# Patient Record
Sex: FEMALE | Race: WHITE | NOT HISPANIC OR LATINO | Employment: OTHER | URBAN - METROPOLITAN AREA
[De-identification: names, ages, dates, MRNs, and addresses within clinical notes are randomized per-mention and may not be internally consistent; named-entity substitution may affect disease eponyms.]

---

## 2017-04-12 ENCOUNTER — TELEPHONE (OUTPATIENT)
Dept: FAMILY MEDICINE | Facility: CLINIC | Age: 67
End: 2017-04-12

## 2017-04-12 NOTE — TELEPHONE ENCOUNTER
Spoke with pt and informed her that she does not need to get labs done until she is seen at the new office in [place], pt scheduled appt for 05/02/2017.

## 2017-04-12 NOTE — TELEPHONE ENCOUNTER
----- Message from Rosa Paul sent at 4/12/2017 11:15 AM CDT -----  Contact: self  Patient is calling from [facility] for lab orders. Patient states the orders are not there. Please call patient at [phone]. Thanks!

## 2017-04-18 ENCOUNTER — PATIENT OUTREACH (OUTPATIENT)
Dept: ADMINISTRATIVE | Facility: HOSPITAL | Age: 67
End: 2017-04-18

## 2017-04-18 NOTE — LETTER
April 18, 2017    Breanna SELENE Shira  [address] [facility] [address]             [facility]  [address]  [facility] [address]  Phone: [phone] Dear Mrs. Silva:    [facility] is committed to your overall health.  To help you get the most out of each of your visits, we will review your information to make sure you are up to date on all of your recommended tests and/or procedures.      Dr. Nguyễn Nicholas is a new provider to us and we may not have your complete medical records on file here at [facility].  We have requested his records from the [facility].  The records we have received so far show that you may be due for your fasting cholesterol labs, colon cancer screening, hepatitis C screening, and possibly some immunizations (flu, pneumonia, shingles, and tetanus).    Medicare does not cover all immunizations to be given in the clinic.  Check your benefits to ensure that you do not need to receive your immunizations at the pharmacy.    If you have had any of the above done at another facility, please bring the records or information with you so that your record at [facility] will be complete.    If you are currently taking medication, please bring it with you to your appointment for review.    Also, if you have any type of Advanced Directives, please bring them with you to your office visit so we may scan them into your chart.    If you have any questions or concerns, please don't hesitate to call.    Thank you for letting us care for you,  Mayra Cruz LPN Clinical Care Coordinator  [facility] [place] [facility] [place]  [phone]

## 2017-05-02 ENCOUNTER — OFFICE VISIT (OUTPATIENT)
Dept: FAMILY MEDICINE | Facility: CLINIC | Age: 67
End: 2017-05-02
Payer: MEDICARE

## 2017-05-02 VITALS
HEART RATE: 66 BPM | OXYGEN SATURATION: 99 % | HEIGHT: 60 IN | SYSTOLIC BLOOD PRESSURE: 130 MMHG | BODY MASS INDEX: 32.96 KG/M2 | WEIGHT: 167.88 LBS | TEMPERATURE: 99 F | DIASTOLIC BLOOD PRESSURE: 80 MMHG

## 2017-05-02 DIAGNOSIS — R03.0 PRE-HYPERTENSION: ICD-10-CM

## 2017-05-02 DIAGNOSIS — E78.00 PURE HYPERCHOLESTEROLEMIA: ICD-10-CM

## 2017-05-02 DIAGNOSIS — M85.80 OSTEOPENIA, UNSPECIFIED LOCATION: ICD-10-CM

## 2017-05-02 DIAGNOSIS — E04.1 THYROID NODULE: ICD-10-CM

## 2017-05-02 DIAGNOSIS — Z00.00 HEALTHCARE MAINTENANCE: ICD-10-CM

## 2017-05-02 DIAGNOSIS — J45.20 MILD INTERMITTENT ASTHMA WITHOUT COMPLICATION: ICD-10-CM

## 2017-05-02 DIAGNOSIS — E55.9 VITAMIN D INSUFFICIENCY: ICD-10-CM

## 2017-05-02 DIAGNOSIS — E03.9 HYPOTHYROIDISM, UNSPECIFIED TYPE: ICD-10-CM

## 2017-05-02 DIAGNOSIS — E11.9 TYPE 2 DIABETES MELLITUS WITHOUT COMPLICATION, WITHOUT LONG-TERM CURRENT USE OF INSULIN: ICD-10-CM

## 2017-05-02 DIAGNOSIS — K63.5 POLYP OF COLON, UNSPECIFIED PART OF COLON, UNSPECIFIED TYPE: ICD-10-CM

## 2017-05-02 DIAGNOSIS — E66.9 OBESITY (BMI 30.0-34.9): ICD-10-CM

## 2017-05-02 DIAGNOSIS — J30.2 SEASONAL ALLERGIC RHINITIS, UNSPECIFIED ALLERGIC RHINITIS TRIGGER: Primary | ICD-10-CM

## 2017-05-02 PROCEDURE — 1160F RVW MEDS BY RX/DR IN RCRD: CPT | Mod: S$GLB,,, | Performed by: INTERNAL MEDICINE

## 2017-05-02 PROCEDURE — 1126F AMNT PAIN NOTED NONE PRSNT: CPT | Mod: S$GLB,,, | Performed by: INTERNAL MEDICINE

## 2017-05-02 PROCEDURE — 99999 PR PBB SHADOW E&M-EST. PATIENT-LVL III: CPT | Mod: PBBFAC,,, | Performed by: INTERNAL MEDICINE

## 2017-05-02 PROCEDURE — 99214 OFFICE O/P EST MOD 30 MIN: CPT | Mod: S$GLB,,, | Performed by: INTERNAL MEDICINE

## 2017-05-02 PROCEDURE — 1159F MED LIST DOCD IN RCRD: CPT | Mod: S$GLB,,, | Performed by: INTERNAL MEDICINE

## 2017-05-02 RX ORDER — FUROSEMIDE 20 MG/1
20 TABLET ORAL DAILY
COMMUNITY
End: 2017-05-02 | Stop reason: SDUPTHER

## 2017-05-02 RX ORDER — UBIDECARENONE 30 MG
200 CAPSULE ORAL DAILY
COMMUNITY
End: 2017-12-13 | Stop reason: DRUGHIGH

## 2017-05-02 RX ORDER — ALBUTEROL SULFATE 90 UG/1
2 AEROSOL, METERED RESPIRATORY (INHALATION) EVERY 6 HOURS PRN
COMMUNITY
End: 2021-07-19 | Stop reason: SDUPTHER

## 2017-05-02 RX ORDER — FUROSEMIDE 20 MG/1
20 TABLET ORAL DAILY
Qty: 30 TABLET | Refills: 2 | Status: SHIPPED | OUTPATIENT
Start: 2017-05-02 | End: 2017-08-31

## 2017-05-02 RX ORDER — BIOTIN 1 MG
1000 TABLET ORAL DAILY
COMMUNITY

## 2017-05-02 RX ORDER — MULTIVITAMIN
1 TABLET ORAL DAILY
COMMUNITY

## 2017-05-02 RX ORDER — ROSUVASTATIN CALCIUM 10 MG/1
10 TABLET, COATED ORAL NIGHTLY
COMMUNITY
End: 2017-05-02 | Stop reason: SDUPTHER

## 2017-05-02 RX ORDER — ROSUVASTATIN CALCIUM 10 MG/1
10 TABLET, COATED ORAL NIGHTLY
Qty: 30 TABLET | Refills: 2 | Status: SHIPPED | OUTPATIENT
Start: 2017-05-02 | End: 2018-08-13 | Stop reason: SDUPTHER

## 2017-05-02 RX ORDER — FLUTICASONE PROPIONATE AND SALMETEROL XINAFOATE 230; 21 UG/1; UG/1
2 AEROSOL, METERED RESPIRATORY (INHALATION) 2 TIMES DAILY
COMMUNITY
End: 2019-04-15 | Stop reason: SDUPTHER

## 2017-05-02 NOTE — TELEPHONE ENCOUNTER
Please notify patient of medications requested was sent in to pharmacy and a please keep her follow-up appointment

## 2017-05-02 NOTE — TELEPHONE ENCOUNTER
LOV 05/02/2017     Follow up scheduled for 05/24/2017.    Pt tolerates Crestor fine with no problems.

## 2017-05-02 NOTE — MR AVS SNAPSHOT
[facility]  [address]  [place] [address]  Phone: [phone]  Fax: [phone]                  Breanna Silva   2017 8:40 AM   Office Visit    Description:  Female : 1950   Provider:  Nguyễn Nicholas MD   Department:  [facility]           Reason for Visit     Establish Care           Diagnoses this Visit        Comments    Seasonal allergic rhinitis, unspecified allergic rhinitis trigger    -  Primary     Mild intermittent asthma without complication         Type 2 diabetes mellitus without complication, without long-term current use of insulin         Pure hypercholesterolemia         Hypothyroidism, unspecified type         Thyroid nodule         Osteopenia, unspecified location         Pre-hypertension         Vitamin D insufficiency         Obesity (BMI 30.0-34.9)         Healthcare maintenance         Polyp of colon, unspecified part of colon, unspecified type                To Do List           Future Appointments        Provider Department Dept Phone    2017 10:00 AM Nguyễn Nicholas MD [facility] [phone]      Goals (5 Years of Data)     None      Follow-Up and Disposition     Return in about 3 weeks (around 2017) for Reassessment and go over her labs.    Follow-up and Disposition History      [facility]     [facility] On [facility] Nurse Care Line -  Assistance  Unless otherwise directed by your provider, please contact [facility] On-Call, our nurse care line that is available for  assistance.     Registered nurses in the [facility] provide: appointment scheduling, clinical advisement, health education, and other advisory services.  Call: [phone] (toll free)               Medications           Message regarding Medications     Verify the changes and/or additions to your medication regime listed below are the same as discussed with your clinician today.  If any of these changes or  additions are incorrect, please notify your healthcare provider.             Verify that the below list of medications is an accurate representation of the medications you are currently taking.  If none reported, the list may be blank. If incorrect, please contact your healthcare provider. Carry this list with you in case of emergency.           Current Medications     albuterol (VENTOLIN HFA) 90 mcg/actuation inhaler Inhale 2 puffs into the lungs every 6 (six) hours as needed for Wheezing. Rescue    alendronate (FOSAMAX) 70 MG tablet Take 70 mg by mouth every 7 days.     biotin 1 mg tablet Take 1,000 mcg by mouth once daily.    CALCIUM CARBONATE/VITAMIN D3 (CALCIUM 600 + D,3, ORAL) Take 600 mg by mouth 2 (two) times daily.    co-enzyme Q-10 30 mg capsule Take 200 mg by mouth once daily.    fluticasone-salmeterol 230-21 mcg/dose (ADVAIR HFA) 230-21 mcg/actuation HFAA inhaler Inhale 2 puffs into the lungs 2 (two) times daily. Controller    furosemide (LASIX) 20 MG tablet Take 20 mg by mouth once daily.    LUTEIN ORAL Take by mouth once daily at 6am.    meloxicam (MOBIC) 15 MG tablet Take 1 tablet (15 mg total) by mouth once daily.    multivitamin (ONE DAILY MULTIVITAMIN) per tablet Take 1 tablet by mouth once daily.    rosuvastatin (CRESTOR) 10 MG tablet Take 10 mg by mouth every evening.    XOLAIR 150 mg injection every 28 days.            Clinical Reference Information           Your Vitals Were     BP Pulse Temp Height Weight SpO2    130/80 (BP Location: Right arm, Patient Position: Sitting, BP Method: Manual) 66 98.7 °F (37.1 °C) (Oral) 5' (1.524 m) 76.1 kg (167 lb 14.1 oz) 99%    BMI                32.79 kg/m2          Blood Pressure          Most Recent Value    BP  130/80      Allergies as of 5/2/2017     Byetta [Exenatide]    Flu Vaccine 2011-12(3 Yr+)(pf)    Metformin    Pravastatin Sodium    Prinivil [Lisinopril]    Singulair [Montelukast]    Soma [Carisoprodol]    Vioxx [Rofecoxib]    Avelox [Moxifloxacin]       Immunizations Administered on Date of Encounter - 5/2/2017     None      Orders Placed During Today's Visit     Future Labs/Procedures Expected by Expires    CBC auto differential  5/2/2017 7/1/2018    Comprehensive metabolic panel  5/2/2017 7/1/2018    Hemoglobin A1c  5/2/2017 7/1/2018    Hepatitis C antibody  5/2/2017 7/1/2018    Lipid panel  5/2/2017 7/1/2018    Magnesium  5/2/2017 7/1/2018    Occult blood x 1, stool  5/2/2017 5/2/2018    Occult blood x 1, stool  5/2/2017 5/2/2018    Occult blood x 1, stool  5/2/2017 5/2/2018    T3, free  5/2/2017 7/1/2018    T4, free  5/2/2017 7/1/2018    TSH  5/2/2017 7/1/2018    Urinalysis  5/2/2017 7/1/2018    Vitamin D  5/2/2017 7/1/2018      [facility] Sign-Up     Activating your [facility] account is as easy as 1-2-3!     1) Visit [email or web address], select Sign Up Now, enter this activation code and your date of birth, then select Next.  [email or web address]  Expires: 6/16/2017  9:54 AM      2) Create a username and password to use when you visit [facility] in the future and select a security question in case you lose your password and select Next.    3) Enter your e-mail address and click Sign Up!    Additional Information  If you have questions, please e-mail [email or web address] or call [phone] to talk to our [facility] staff. Remember, [facility] is NOT to be used for urgent needs. For medical emergencies, dial 911.         Language Assistance Services     ATTENTION: Language assistance services are available, free of charge. Please call [phone].      ATENCIÓN: Si habla español, tiene a hernandez disposición servicios gratuitos de asistencia lingüística. Llame al [phone].     CHÚ Ý: N?u b?n nói Ti?ng Vi?t, có các d?ch v? h? tr? ngôn ng? mi?n phí dành cho b?n. G?i s? [phone].         [facility] complies with applicable Federal civil rights laws and does not discriminate on the basis of race, color, national origin, age, disability, or  sex.

## 2017-05-02 NOTE — PROGRESS NOTES
Subjective:       Patient ID: Breanna Silva is a 66 y.o. female.    Chief Complaint: Establish Care    HPI    Review of Systems   Constitutional: Negative for appetite change, fatigue, fever and unexpected weight change.   HENT: Positive for postnasal drip.          history of seasonal ALLERGIES/ perennial ALLERGIES   Eyes: Negative for discharge and itching.        Slight tearing   Respiratory: Negative for cough, chest tightness, shortness of breath and wheezing.    Cardiovascular: Positive for leg swelling. Negative for chest pain and palpitations.        At times.   Gastrointestinal: Positive for constipation. Negative for abdominal distention, abdominal pain, blood in stool, diarrhea, nausea and vomiting.        Denies melena as well; uses metamucil   Endocrine: Negative for polydipsia, polyphagia and polyuria.   Genitourinary: Negative for dysuria, hematuria and urgency.   Musculoskeletal: Negative for arthralgias and myalgias.   Skin: Negative for rash.   Allergic/Immunologic: Positive for environmental allergies. Negative for food allergies and immunocompromised state.        Denies seasonal or perennial ALLERGIES.   Neurological: Negative for tremors, seizures and syncope.   Hematological: Negative for adenopathy. Bruises/bleeds easily.   Psychiatric/Behavioral:        Denies anxiety or depression       Objective:      Vitals:    05/02/17 0841   BP: 130/80   BP Location: Right arm   Patient Position: Sitting   BP Method: Manual   Pulse: 66   Temp: 98.7 °F (37.1 °C)   TempSrc: Oral   SpO2: 99%   Weight: 76.1 kg (167 lb 14.1 oz)   Height: 5' (1.524 m)     Body mass index is 32.79 kg/(m^2).    Physical Exam   Constitutional: She is oriented to person, place, and time. She appears well-developed and well-nourished.   HENT:   Head: Normocephalic and atraumatic.   TM's pink and throat; NM swollen/inflamed/clear mucus   Eyes: EOM are normal.   Neck: Normal range of motion. Neck supple. No thyromegaly present.    No carotid bruits heard   Cardiovascular: Normal rate, regular rhythm and normal heart sounds.  Exam reveals no gallop.    No murmur heard.  Pulmonary/Chest: Effort normal and breath sounds normal. No respiratory distress. She has no wheezes. She has no rales.    cc's.   Abdominal: Soft. Bowel sounds are normal. She exhibits no distension. There is no tenderness. There is no rebound and no guarding.   Musculoskeletal: Normal range of motion. She exhibits no edema.   Lymphadenopathy:     She has no cervical adenopathy.   Neurological: She is alert and oriented to person, place, and time.   Moves all 4 extremities fine.   Skin: No rash noted.   Psychiatric: She has a normal mood and affect. Her behavior is normal. Thought content normal.   Vitals reviewed.      Assessment:       1. Seasonal allergic rhinitis, unspecified allergic rhinitis trigger    2. Mild intermittent asthma without complication    3. Type 2 diabetes mellitus without complication, without long-term current use of insulin    4. Pure hypercholesterolemia    5. Hypothyroidism, unspecified type    6. Thyroid nodule    7. Osteopenia, unspecified location    8. Pre-hypertension    9. Vitamin D insufficiency    10. Obesity (BMI 30.0-34.9)    11. Healthcare maintenance    12. Polyp of colon, unspecified part of colon, unspecified type        Plan:       Seasonal allergic rhinitis, unspecified allergic rhinitis trigger; uses otc antihist; astelin nasal prn     Mild intermittent asthma without complication; on Xolair inj; sees Dr Nelson allergist; advair; albuterol rescue.         Has been stable.  -     CBC auto differential; Future; Expected date: 5/2/17    Type 2 diabetes mellitus without complication, without long-term current use of insulin. Maintain a low carb diet; monitor CBG's at home; keep a log for review.  -     Comprehensive metabolic panel; Future; Expected date: 5/2/17  -     Hemoglobin A1c; Future; Expected date: 5/2/17  -      Urinalysis; Future; Expected date: 5/2/17    Pure hypercholesterolemia. Maintain low fat high fiber diet, exercise regularly. On rosuvastatin.    Hypothyroidism, unspecified type; borderline; not on supplements. TFT's pending  -     TSH; Future; Expected date: 5/2/17  -     T4, free; Future; Expected date: 5/2/17  -     T3, free; Future; Expected date: 5/2/17    Thyroid nodule; yearly thyroid US; await [facility] facility records for review.    Osteopenia, unspecified location. Weight bearing exercises, continue calcium and vit D supplements. On fosamax; awaiting rerport from [facility]        -     Magnesium; Future; Expected date: 5/2/17  -     Vitamin D; Future; Expected date: 5/2/17    Pre-hypertension; watch salt intake; check BP periodically.    Vitamin D insufficiency; levels pending.    Obesity (BMI 30.0-34.9). Caloric restriction w regular exercise and weight reduction.  -     Lipid panel; Future; Expected date: 5/2/17    Healthcare maintenance  -     CBC auto differential; Future; Expected date: 5/2/17  -     Comprehensive metabolic panel; Future; Expected date: 5/2/17  -     Hepatitis C antibody; Future; Expected date: 5/2/17  -     Lipid panel; Future; Expected date: 5/2/17    Polyp of colon, unspecified part of colon, unspecified type; GI Dr mason to be called for TC arrangements  -     Occult blood x 1, stool; Future; Expected date: 5/2/17  -     Occult blood x 1, stool; Future; Expected date: 5/2/17  -     Occult blood x 1, stool; Future; Expected date: 5/2/17

## 2017-05-10 ENCOUNTER — PATIENT OUTREACH (OUTPATIENT)
Dept: ADMINISTRATIVE | Facility: HOSPITAL | Age: 67
End: 2017-05-10

## 2017-05-10 NOTE — LETTER
May 10, 2017    Breanna SELENE Shira  [address] [facility] [address]             [facility]  [address]  [facility] [address]  Phone: [phone] Dear Mrs. Silva:    [facility] is committed to your overall health.  To help you get the most out of each of your visits, we will review your information to make sure you are up to date on all of your recommended tests and/or procedures.      Dr. Nguyễn Nicholas has found that you may be due for your fasting cholesterol and diabetes labs, a urine test for your kidneys, your annual diabetic eye and foot exams, colon cancer screening, hepatitis C screening, and possibly some immunizations (pneumonia, shingles, and tetanus).     Medicare does not cover all immunizations to be given in the clinic.  Check your benefits to ensure that you do not need to receive your immunizations at the pharmacy.    If you have had any of the above done at another facility, please bring the records or information with you so that your record at [facility] will be complete.  If you would like to schedule any of these, please contact me.    If you are currently taking medication, please bring it with you to your appointment for review.    Also, if you have any type of Advanced Directives, please bring them with you to your office visit so we may scan them into your chart.    If you have any questions or concerns, please don't hesitate to call.    Thank you for letting us care for you,  Mayra Cruz LPN Clinical Care Coordinator  [facility] [place] [facility] [place]  [phone]

## 2017-05-11 LAB
APPEARANCE UR: ABNORMAL
BILIRUB UR QL STRIP: NEGATIVE
COLOR UR: YELLOW
GLUCOSE UR QL STRIP: NEGATIVE
HGB UR QL STRIP: NEGATIVE
KETONES UR QL STRIP: NEGATIVE
LEUKOCYTE ESTERASE UR QL STRIP: NEGATIVE
NITRITE UR QL STRIP: NEGATIVE
PH UR STRIP: 6 [PH] (ref 5–8)
PROT UR QL STRIP: NEGATIVE
SP GR UR STRIP: 1.02 (ref 1–1.03)

## 2017-05-16 LAB
1,25(OH)2D SERPL-MCNC: 31 PG/ML (ref 18–72)
1,25(OH)2D2 SERPL-MCNC: <8 PG/ML
1,25(OH)2D3 SERPL-MCNC: 31 PG/ML
ALBUMIN SERPL-MCNC: 4 G/DL (ref 3.6–5.1)
ALBUMIN/GLOB SERPL: 1.6 (CALC) (ref 1–2.5)
ALP SERPL-CCNC: 50 U/L (ref 33–130)
ALT SERPL-CCNC: 23 U/L (ref 6–29)
AST SERPL-CCNC: 25 U/L (ref 10–35)
BASOPHILS # BLD AUTO: 37 CELLS/UL (ref 0–200)
BASOPHILS NFR BLD AUTO: 0.6 %
BILIRUB SERPL-MCNC: 0.5 MG/DL (ref 0.2–1.2)
BUN SERPL-MCNC: 12 MG/DL (ref 7–25)
BUN/CREAT SERPL: ABNORMAL (CALC) (ref 6–22)
CALCIUM SERPL-MCNC: 8.9 MG/DL (ref 8.6–10.4)
CHLORIDE SERPL-SCNC: 106 MMOL/L (ref 98–110)
CHOLEST SERPL-MCNC: 175 MG/DL (ref 125–200)
CHOLEST/HDLC SERPL: 2.1 (CALC)
CO2 SERPL-SCNC: 29 MMOL/L (ref 20–31)
CREAT SERPL-MCNC: 0.73 MG/DL (ref 0.5–0.99)
EOSINOPHIL # BLD AUTO: 74 CELLS/UL (ref 15–500)
EOSINOPHIL NFR BLD AUTO: 1.2 %
ERYTHROCYTE [DISTWIDTH] IN BLOOD BY AUTOMATED COUNT: 13.5 % (ref 11–15)
GFR SERPL CREATININE-BSD FRML MDRD: 85 ML/MIN/1.73M2
GLOBULIN SER CALC-MCNC: 2.5 G/DL (CALC) (ref 1.9–3.7)
GLUCOSE SERPL-MCNC: 112 MG/DL (ref 65–99)
HBA1C MFR BLD: 6.3 % OF TOTAL HGB
HCT VFR BLD AUTO: 42.2 % (ref 35–45)
HCV AB S/CO SERPL IA: 0.01
HCV AB SERPL QL IA: NORMAL
HDLC SERPL-MCNC: 85 MG/DL
HGB BLD-MCNC: 13.8 G/DL (ref 11.7–15.5)
LDLC SERPL CALC-MCNC: 77 MG/DL (CALC)
LYMPHOCYTES # BLD AUTO: 2964 CELLS/UL (ref 850–3900)
LYMPHOCYTES NFR BLD AUTO: 47.8 %
MAGNESIUM SERPL-MCNC: 2 MG/DL (ref 1.5–2.5)
MCH RBC QN AUTO: 29.5 PG (ref 27–33)
MCHC RBC AUTO-ENTMCNC: 32.7 G/DL (ref 32–36)
MCV RBC AUTO: 90.3 FL (ref 80–100)
MONOCYTES # BLD AUTO: 279 CELLS/UL (ref 200–950)
MONOCYTES NFR BLD AUTO: 4.5 %
NEUTROPHILS # BLD AUTO: 2846 CELLS/UL (ref 1500–7800)
NEUTROPHILS NFR BLD AUTO: 45.9 %
NONHDLC SERPL-MCNC: 90 MG/DL (CALC)
PLATELET # BLD AUTO: 222 THOUSAND/UL (ref 140–400)
PMV BLD REES-ECKER: 9.5 FL (ref 7.5–12.5)
POTASSIUM SERPL-SCNC: 4.2 MMOL/L (ref 3.5–5.3)
PROT SERPL-MCNC: 6.5 G/DL (ref 6.1–8.1)
RBC # BLD AUTO: 4.68 MILLION/UL (ref 3.8–5.1)
SODIUM SERPL-SCNC: 141 MMOL/L (ref 135–146)
T3FREE SERPL-MCNC: 2.9 PG/ML (ref 2.3–4.2)
T4 FREE SERPL-MCNC: 1.2 NG/DL (ref 0.8–1.8)
TRIGL SERPL-MCNC: 65 MG/DL
TSH SERPL-ACNC: 2.75 MIU/L (ref 0.4–4.5)
WBC # BLD AUTO: 6.2 THOUSAND/UL (ref 3.8–10.8)

## 2017-05-24 ENCOUNTER — OFFICE VISIT (OUTPATIENT)
Dept: FAMILY MEDICINE | Facility: CLINIC | Age: 67
End: 2017-05-24
Payer: MEDICARE

## 2017-05-24 VITALS
HEART RATE: 71 BPM | TEMPERATURE: 98 F | WEIGHT: 169.56 LBS | SYSTOLIC BLOOD PRESSURE: 118 MMHG | BODY MASS INDEX: 33.29 KG/M2 | HEIGHT: 60 IN | DIASTOLIC BLOOD PRESSURE: 75 MMHG | OXYGEN SATURATION: 96 %

## 2017-05-24 DIAGNOSIS — M48.061 LUMBAR SPINAL STENOSIS: ICD-10-CM

## 2017-05-24 DIAGNOSIS — M85.80 OSTEOPENIA, UNSPECIFIED LOCATION: ICD-10-CM

## 2017-05-24 DIAGNOSIS — J98.01 BRONCHOSPASM: ICD-10-CM

## 2017-05-24 DIAGNOSIS — E78.00 PURE HYPERCHOLESTEROLEMIA: ICD-10-CM

## 2017-05-24 DIAGNOSIS — E55.9 VITAMIN D INSUFFICIENCY: ICD-10-CM

## 2017-05-24 DIAGNOSIS — J30.2 SEASONAL ALLERGIC RHINITIS, UNSPECIFIED ALLERGIC RHINITIS TRIGGER: ICD-10-CM

## 2017-05-24 DIAGNOSIS — M48.02 CERVICAL SPINAL STENOSIS: ICD-10-CM

## 2017-05-24 DIAGNOSIS — E66.9 OBESITY (BMI 30.0-34.9): ICD-10-CM

## 2017-05-24 DIAGNOSIS — J45.20 MILD INTERMITTENT ASTHMA WITHOUT COMPLICATION: ICD-10-CM

## 2017-05-24 DIAGNOSIS — E11.9 TYPE 2 DIABETES MELLITUS WITHOUT COMPLICATION, WITHOUT LONG-TERM CURRENT USE OF INSULIN: Primary | ICD-10-CM

## 2017-05-24 PROCEDURE — 1126F AMNT PAIN NOTED NONE PRSNT: CPT | Mod: S$GLB,,, | Performed by: INTERNAL MEDICINE

## 2017-05-24 PROCEDURE — 99214 OFFICE O/P EST MOD 30 MIN: CPT | Mod: S$GLB,,, | Performed by: INTERNAL MEDICINE

## 2017-05-24 PROCEDURE — 3044F HG A1C LEVEL LT 7.0%: CPT | Mod: S$GLB,,, | Performed by: INTERNAL MEDICINE

## 2017-05-24 PROCEDURE — 99999 PR PBB SHADOW E&M-EST. PATIENT-LVL IV: CPT | Mod: PBBFAC,,, | Performed by: INTERNAL MEDICINE

## 2017-05-24 PROCEDURE — 1159F MED LIST DOCD IN RCRD: CPT | Mod: S$GLB,,, | Performed by: INTERNAL MEDICINE

## 2017-05-24 RX ORDER — BACLOFEN 10 MG/1
TABLET ORAL
Qty: 30 TABLET | Refills: 0 | Status: SHIPPED | OUTPATIENT
Start: 2017-05-24 | End: 2017-11-27

## 2017-05-24 NOTE — PROGRESS NOTES
Subjective:       Patient ID: Breanna Silva is a 66 y.o. female.    Chief Complaint: Follow up labs    HPI  overall patient has been doing fine here to follow-up up on her labs.  She has pure hypercholesterolemia and is on a low-fat high-fiber diet or at least tries.  For diabetes mellitus type 2, she watches her carbohydrates; CBGs have been averaging 110-120 in the morning.  Lipitor gave her leg cramps; now she is on Crestor 5 mg 3 times a week and that improved; she has mild intermittent asthma without complication; sees Dr. Nelson as her allergist; she is on Advair, albuterol, and Xolair monthly injections.  Her ALLERGIES have increased lately; she was advised to use her albuterol rescue more regularly.     For osteopenia, she is on calcium with vitamin D and alendronate; 5/4/16 DEXA showed left femoral T score -2.2 and lumbar spine was -1.1; we'll repeat 5/4/18; she has chronic left lower back pain; x-ray 4/27/17 revealed osteophytes lumbar spine.  She reports that she was told that she needs a lumbar spine fusion by Dr. Johnson involving C6-7; she has reportedly had a myelogram with CT in the past showing cervical spine stenosis and lumbar spine stenosis of note; her neck has a plate with screw;she is to keep her follow-up with Dr. Johnson    Review of Systems   Constitutional: Negative for appetite change, fever and unexpected weight change.   HENT:         history of seasonal ALLERGIES / perennial ALLERGIES   Eyes: Negative for discharge and itching.   Respiratory: Positive for cough. Negative for chest tightness, shortness of breath and wheezing.         Occ cough suspects allergies/asthma.   Cardiovascular: Positive for palpitations. Negative for chest pain and leg swelling.        Occ w M and M's; to eliminate her caffeine.   Gastrointestinal: Negative for abdominal distention, abdominal pain, blood in stool, constipation, diarrhea, nausea and vomiting.        Denies melena as well   Genitourinary:  Negative for dysuria, frequency, hematuria and urgency.   Musculoskeletal: Positive for back pain and neck pain. Negative for arthralgias and myalgias.   Skin: Negative for rash.   Allergic/Immunologic: Positive for environmental allergies. Negative for food allergies and immunocompromised state.        Denies seasonal or perennial ALLERGIES.   Neurological: Negative for tremors, seizures and syncope.   Hematological: Negative for adenopathy. Does not bruise/bleed easily.   Psychiatric/Behavioral:        Denies anxiety or depression       Objective:      Vitals:    05/24/17 1010   BP: 118/75   BP Location: Right arm   Patient Position: Sitting   BP Method: Manual   Pulse: 71   Temp: 98.2 °F (36.8 °C)   TempSrc: Oral   SpO2: 96%   Weight: 76.9 kg (169 lb 8.5 oz)   Height: 5' (1.524 m)     Body mass index is 33.11 kg/m².    Physical Exam   Constitutional: She is oriented to person, place, and time. She appears well-developed and well-nourished.   HENT:   Head: Normocephalic and atraumatic.   Eyes: EOM are normal.   Neck: Normal range of motion. Neck supple. No thyromegaly present.   mild decreased range of motion of neck.  No cervical spine tenderness appreciated   Cardiovascular: Normal rate, regular rhythm and normal heart sounds.  Exam reveals no gallop.    No murmur heard.  Pulmonary/Chest: Effort normal and breath sounds normal. No respiratory distress. She has no wheezes. She has no rales.   Abdominal: Soft. Bowel sounds are normal. She exhibits no distension. There is no tenderness. There is no rebound and no guarding.   Musculoskeletal: Normal range of motion. She exhibits no edema.   No lumbar- sacral spine tenderness appreciated.   Lymphadenopathy:     She has no cervical adenopathy.   Neurological: She is alert and oriented to person, place, and time.   Moves all 4 extremities fine.   Skin: No rash noted.   Psychiatric: She has a normal mood and affect. Her behavior is normal. Thought content normal.    Vitals reviewed.      Assessment:       1. Type 2 diabetes mellitus without complication, without long-term current use of insulin    2. Pure hypercholesterolemia    3. Mild intermittent asthma without complication    4. Osteopenia, unspecified location    5. Vitamin D insufficiency    6. Obesity (BMI 30.0-34.9)    7. Bronchospasm    8. Seasonal allergic rhinitis, unspecified allergic rhinitis trigger    9. Cervical spinal stenosis    10. Lumbar spinal stenosis        Plan:       Type 2 diabetes mellitus without complication, without long-term current use of insulin. Maintain a low carb diet; monitor CBG's at home; keep a log for review.  -     Comprehensive metabolic panel; Future; Expected date: 05/24/2017  -     Urinalysis; Future; Expected date: 05/24/2017    Pure hypercholesterolemia. Maintain low fat high fiber diet, exercise regularly.  -     Comprehensive metabolic panel; Future; Expected date: 05/24/2017  -     Lipid panel; Future; Expected date: 05/24/2017  -     TSH; Future; Expected date: 05/24/2017  -     T4, free; Future; Expected date: 05/24/2017    Mild intermittent asthma without complication; use her rescue inhaler more.    Osteopenia, unspecified location. Weight bearing exercises, continue calcium and vit D supplements.  -     Vitamin D; Future; Expected date: 05/24/2017    Vitamin D insufficiency; see increase to 3000 iu a day  -     Vitamin D; Future; Expected date: 05/24/2017    Obesity (BMI 30.0-34.9). Caloric restriction w regular exercise and weight reduction. Cont weight reduction efforts.    Bronchospasm; can benefit from user her rescue inhaler more regularly prn if needed.    Seasonal allergic rhinitis, unspecified allergic rhinitis trigger. Astelin nasal prn; add simply saline prn congestion    Cervical spinal stenosis; add baclofen to her meds prn; keep per follows with orthopedics Dr. Johnson  -     baclofen (LIORESAL) 10 MG tablet; 1 po TID to QID prn pain  Dispense: 30 tablet;  Refill: 0    Lumbar spinal stenosis; keep her f/u w ortho Dr Johnson if needed.-     baclofen (LIORESAL) 10 MG tablet; 1 po TID to QID prn pain  Dispense: 30 tablet; Refill: 0

## 2017-05-24 NOTE — PATIENT INSTRUCTIONS
[facility] for her labs. Use albuterol rescue inhaler as needed for wheezing q 4-6 hrs prn; keep her f/u's w Dr Nelson her allergist as recommended. Exercise as tolerated; low fat high fiber, low carb diet.

## 2017-05-26 DIAGNOSIS — E11.9 TYPE 2 DIABETES MELLITUS WITHOUT COMPLICATION: ICD-10-CM

## 2017-08-08 LAB — CRC RECOMMENDATION EXT: NORMAL

## 2017-08-15 ENCOUNTER — PATIENT OUTREACH (OUTPATIENT)
Dept: ADMINISTRATIVE | Facility: HOSPITAL | Age: 67
End: 2017-08-15

## 2017-08-15 NOTE — PROGRESS NOTES
DM non [facility] communication, due a urine test for your kidneys, your annual diabetic eye and foot exams, colon cancer screening, and possibly tetanus, shingles and pneumonia immunizations

## 2017-08-15 NOTE — LETTER
August 15, 2017    Breanna Silva  [address] [address] [place] [address]             [facility]  [address]  [place] [address]  Phone: [phone] Dear Tera Silva:    [facility] committed to your overall health.  To help you get the most out of each of your visits, we will review your information to make sure you are up to date on all of your recommended tests and/or procedures.      We have Dr. Nguyễn Nicholas listed as your primary care provider.  He has found that you may be due for a urine test for your kidneys, your annual diabetic eye and foot exams, colon cancer screening, and possibly tetanus, shingles and pneumonia immunizations.     If you have had any of the above done at an outside facility, please let us know so I can update your record.  If you have a copy of these records, please provide a copy for us to scan into your chart.  If not, please provide that provider/facilities contact information so that we may obtain copies from that facility.     Otherwise, please schedule these appointments at your earliest convenience.  You are due for your diabetic follow up with Dr. Nicholas in November of 2017.    If you have any questions or concerns, please don't hesitate to call.    Thank you for letting us care for you,  Mayra Cruz LPN Clinical Care Coordinator  [facility] [place] [facility] [place]  [phone]

## 2017-08-31 ENCOUNTER — TELEPHONE (OUTPATIENT)
Dept: FAMILY MEDICINE | Facility: CLINIC | Age: 67
End: 2017-08-31

## 2017-08-31 RX ORDER — TORSEMIDE 20 MG/1
20 TABLET ORAL DAILY
Qty: 30 TABLET | Refills: 0 | Status: SHIPPED | OUTPATIENT
Start: 2017-08-31 | End: 2017-11-27 | Stop reason: SDUPTHER

## 2017-08-31 NOTE — TELEPHONE ENCOUNTER
Spoke with pt and she said that she wants to know if Furosemide 20 mg can be changed to Toresemide 20 mg due to she said that Furosemide 20 mg takes hours to work when Toresemide 20 mg works faster. She has been on Toresemide 20 mg before.     Please review and advise.

## 2017-08-31 NOTE — TELEPHONE ENCOUNTER
----- Message from Julia Arvizu sent at 8/31/2017  4:20 PM CDT -----  Contact: self [phone]  Patient requesting to change a prescription   Please call [phone]

## 2017-09-29 DIAGNOSIS — Z12.11 COLON CANCER SCREENING: ICD-10-CM

## 2017-11-13 ENCOUNTER — TELEPHONE (OUTPATIENT)
Dept: FAMILY MEDICINE | Facility: CLINIC | Age: 67
End: 2017-11-13

## 2017-11-13 ENCOUNTER — TELEPHONE (OUTPATIENT)
Dept: ADMINISTRATIVE | Facility: HOSPITAL | Age: 67
End: 2017-11-13

## 2017-11-13 ENCOUNTER — LAB VISIT (OUTPATIENT)
Dept: LAB | Facility: HOSPITAL | Age: 67
End: 2017-11-13
Attending: INTERNAL MEDICINE
Payer: MEDICARE

## 2017-11-13 ENCOUNTER — PATIENT OUTREACH (OUTPATIENT)
Dept: ADMINISTRATIVE | Facility: HOSPITAL | Age: 67
End: 2017-11-13

## 2017-11-13 DIAGNOSIS — E55.9 VITAMIN D INSUFFICIENCY: ICD-10-CM

## 2017-11-13 DIAGNOSIS — M85.80 OSTEOPENIA, UNSPECIFIED LOCATION: ICD-10-CM

## 2017-11-13 DIAGNOSIS — E11.00 TYPE 2 DIABETES MELLITUS WITH HYPEROSMOLARITY WITHOUT COMA, UNSPECIFIED LONG TERM INSULIN USE STATUS: Primary | ICD-10-CM

## 2017-11-13 DIAGNOSIS — E78.00 PURE HYPERCHOLESTEROLEMIA: Primary | ICD-10-CM

## 2017-11-13 DIAGNOSIS — E11.9 TYPE 2 DIABETES MELLITUS WITHOUT COMPLICATION, WITHOUT LONG-TERM CURRENT USE OF INSULIN: ICD-10-CM

## 2017-11-13 DIAGNOSIS — E03.9 HYPOTHYROIDISM, UNSPECIFIED TYPE: ICD-10-CM

## 2017-11-13 LAB
ESTIMATED AVG GLUCOSE: 120 MG/DL
HBA1C MFR BLD HPLC: 5.8 %
MAGNESIUM SERPL-MCNC: 2.1 MG/DL
T3FREE SERPL-MCNC: 2.3 PG/ML

## 2017-11-13 PROCEDURE — 36415 COLL VENOUS BLD VENIPUNCTURE: CPT | Mod: PO

## 2017-11-13 PROCEDURE — 84481 FREE ASSAY (FT-3): CPT

## 2017-11-13 PROCEDURE — 83036 HEMOGLOBIN GLYCOSYLATED A1C: CPT

## 2017-11-13 PROCEDURE — 83735 ASSAY OF MAGNESIUM: CPT

## 2017-11-13 NOTE — TELEPHONE ENCOUNTER
Notify patient that her labs have been placed at [facility] as requested by the patient to allow 10 days for her vitamin D level to come back before her office visit.  Labs include a lipid panel as well as a fasting blood sugar so she needs to have them performed with an overnight fast before that drawn in the morning

## 2017-11-13 NOTE — TELEPHONE ENCOUNTER
----- Message from Mara Ley sent at 11/13/2017  8:58 AM CST -----  Orders for labs.  Please call patient at as soon as possible at [phone].

## 2017-11-13 NOTE — TELEPHONE ENCOUNTER
Pt wants to have labs done today at [facility], due to [facility] is saying that they dont have orders.     Please sign pending orders

## 2017-11-13 NOTE — LETTER
November 13, 2017    Dr. Kolton Clemente             [facility]  [address]  [facility] [address]  Phone: [phone] November 13, 2017     Patient: Breanna Silva    YOB: 1950   Date of Visit: 11/13/2017       To Whom It May Concern:    We are seeing Breanna Silva in the clinic at [facility].  Nguyễn Nicholas MD is their PCP.  She has an outstanding lab/procedure at the time we reviewed their chart.  To help with our Health Maintenance records will you please supply the following report(s):     []  Mammogram                                                []  Colonoscopy   []  Pap Smear                                                  []  Outside Lab Results   []  Dexa scans                                                      [x]  Eye Exam (done 2017 - any diagnosis of retinopathy?)   []  Foot Exam                                                     [] Other___________   []  Outside Immunizations                             Please Fax to [facility] at .    Thank you for your help.  If my Care Coordinator can be of any assistance, you can call SRINIVASA DelgadoCCC at [phone].     If you have any questions or concerns, please don't hesitate to call.    Sincerely,        Nguyễn Nicholas MD

## 2017-11-13 NOTE — LETTER
November 13, 2017    Breanna Silva  [address] [place] [address]             [facility]  [address]  [place] [address]  Phone: [phone] Dear Mrs. Silva:    [facility] is committed to your overall health.  To help you get the most out of each of your visits, we will review your information to make sure you are up to date on all of your recommended tests and/or procedures.      Dr. Nguyễn Nicholas has found that your chart shows you may be due for your annual diabetic eye and foot exams, a urine test for your kidneys, colon cancer screening, and possibly some immunizations (tetanus, shingles, pneumonia, and flu).     Medicare does not cover all immunizations to be given in the clinic.  Check your benefits to ensure that you do not need to receive your immunizations at the pharmacy.    If you have not had an eye exam in the past year, we now have a  Retinal Camera at the [facility].  If we do this exam the same day you see a member of your Primary Care team, we can save you from having to pay a second co pay.      If you have had any of the above done at another facility, please bring the records or information with you so that your record at [facility] will be complete.  If you would like to schedule any of these, please contact me.    If you are currently taking medication, please bring it with you to your appointment for review.    Also, if you have any type of Advanced Directives, please bring them with you to your office visit so we may scan them into your chart.    If you have any questions or concerns, please don't hesitate to call.    Thank you for letting us care for you,  Mayra Cruz LPN Clinical Care Coordinator  [facility]  [phone]

## 2017-11-18 ENCOUNTER — TELEPHONE (OUTPATIENT)
Dept: FAMILY MEDICINE | Facility: CLINIC | Age: 67
End: 2017-11-18

## 2017-11-18 DIAGNOSIS — J45.909 ASTHMA, UNSPECIFIED ASTHMA SEVERITY, UNSPECIFIED WHETHER COMPLICATED, UNSPECIFIED WHETHER PERSISTENT: ICD-10-CM

## 2017-11-18 DIAGNOSIS — E11.9 TYPE 2 DIABETES MELLITUS WITHOUT COMPLICATION, WITHOUT LONG-TERM CURRENT USE OF INSULIN: Primary | ICD-10-CM

## 2017-11-18 DIAGNOSIS — E78.00 HYPERCHOLESTEREMIA: ICD-10-CM

## 2017-11-18 NOTE — TELEPHONE ENCOUNTER
Please notify patient she is past due for f/u for some time now; please schedule an appointment  for follow-up; to also include  Foot exam;  She does have documentation now of eye exam performed by Dr. Clemente 9/11/17;  She will also [facility] for her labs, prior to her follow-up; this has been  entered into the computer for her

## 2017-11-18 NOTE — TELEPHONE ENCOUNTER
please notify patieint that she has some additional labs that need to be performed prior to  Her visit.  I have moved them from [employer] to [facility]; please draw them after overnight fasting  at [facility]

## 2017-11-21 ENCOUNTER — LAB VISIT (OUTPATIENT)
Dept: LAB | Facility: HOSPITAL | Age: 67
End: 2017-11-21
Attending: INTERNAL MEDICINE
Payer: MEDICARE

## 2017-11-21 DIAGNOSIS — E55.9 VITAMIN D INSUFFICIENCY: ICD-10-CM

## 2017-11-21 DIAGNOSIS — M85.80 OSTEOPENIA, UNSPECIFIED LOCATION: ICD-10-CM

## 2017-11-21 DIAGNOSIS — E03.9 HYPOTHYROIDISM, UNSPECIFIED TYPE: ICD-10-CM

## 2017-11-21 DIAGNOSIS — K63.5 POLYP OF COLON, UNSPECIFIED PART OF COLON, UNSPECIFIED TYPE: ICD-10-CM

## 2017-11-21 DIAGNOSIS — E78.00 PURE HYPERCHOLESTEROLEMIA: ICD-10-CM

## 2017-11-21 LAB
25(OH)D3+25(OH)D2 SERPL-MCNC: 46 NG/ML
ALBUMIN SERPL BCP-MCNC: 3.6 G/DL
ALP SERPL-CCNC: 69 U/L
ALT SERPL W/O P-5'-P-CCNC: 20 U/L
ANION GAP SERPL CALC-SCNC: 9 MMOL/L
AST SERPL-CCNC: 28 U/L
BASOPHILS # BLD AUTO: 0.06 K/UL
BASOPHILS NFR BLD: 1.1 %
BILIRUB SERPL-MCNC: 0.5 MG/DL
BUN SERPL-MCNC: 12 MG/DL
CALCIUM SERPL-MCNC: 9.3 MG/DL
CHLORIDE SERPL-SCNC: 107 MMOL/L
CHOLEST SERPL-MCNC: 179 MG/DL
CHOLEST/HDLC SERPL: 2.4 {RATIO}
CO2 SERPL-SCNC: 27 MMOL/L
CREAT SERPL-MCNC: 0.8 MG/DL
DIFFERENTIAL METHOD: NORMAL
EOSINOPHIL # BLD AUTO: 0.1 K/UL
EOSINOPHIL NFR BLD: 2 %
ERYTHROCYTE [DISTWIDTH] IN BLOOD BY AUTOMATED COUNT: 12.6 %
EST. GFR  (AFRICAN AMERICAN): >60 ML/MIN/1.73 M^2
EST. GFR  (NON AFRICAN AMERICAN): >60 ML/MIN/1.73 M^2
GLUCOSE SERPL-MCNC: 102 MG/DL
HCT VFR BLD AUTO: 40.4 %
HDLC SERPL-MCNC: 76 MG/DL
HDLC SERPL: 42.5 %
HGB BLD-MCNC: 13.7 G/DL
IMM GRANULOCYTES # BLD AUTO: 0.01 K/UL
IMM GRANULOCYTES NFR BLD AUTO: 0.2 %
LDLC SERPL CALC-MCNC: 88.2 MG/DL
LYMPHOCYTES # BLD AUTO: 2.6 K/UL
LYMPHOCYTES NFR BLD: 45.9 %
MCH RBC QN AUTO: 29.1 PG
MCHC RBC AUTO-ENTMCNC: 33.9 G/DL
MCV RBC AUTO: 86 FL
MONOCYTES # BLD AUTO: 0.3 K/UL
MONOCYTES NFR BLD: 5.4 %
NEUTROPHILS # BLD AUTO: 2.5 K/UL
NEUTROPHILS NFR BLD: 45.4 %
NONHDLC SERPL-MCNC: 103 MG/DL
NRBC BLD-RTO: 0 /100 WBC
PLATELET # BLD AUTO: 239 K/UL
PMV BLD AUTO: 11 FL
POTASSIUM SERPL-SCNC: 3.9 MMOL/L
PROT SERPL-MCNC: 7.4 G/DL
RBC # BLD AUTO: 4.7 M/UL
SODIUM SERPL-SCNC: 143 MMOL/L
T4 FREE SERPL-MCNC: 1.06 NG/DL
TRIGL SERPL-MCNC: 74 MG/DL
TSH SERPL DL<=0.005 MIU/L-ACNC: 1.81 UIU/ML
WBC # BLD AUTO: 5.55 K/UL

## 2017-11-21 PROCEDURE — 80061 LIPID PANEL: CPT

## 2017-11-21 PROCEDURE — 80053 COMPREHEN METABOLIC PANEL: CPT

## 2017-11-21 PROCEDURE — 82306 VITAMIN D 25 HYDROXY: CPT

## 2017-11-21 PROCEDURE — 85025 COMPLETE CBC W/AUTO DIFF WBC: CPT

## 2017-11-21 PROCEDURE — 84443 ASSAY THYROID STIM HORMONE: CPT

## 2017-11-21 PROCEDURE — 84439 ASSAY OF FREE THYROXINE: CPT

## 2017-11-21 PROCEDURE — 36415 COLL VENOUS BLD VENIPUNCTURE: CPT | Mod: PO

## 2017-11-27 ENCOUNTER — OFFICE VISIT (OUTPATIENT)
Dept: FAMILY MEDICINE | Facility: CLINIC | Age: 67
End: 2017-11-27
Payer: MEDICARE

## 2017-11-27 VITALS
HEART RATE: 68 BPM | SYSTOLIC BLOOD PRESSURE: 118 MMHG | DIASTOLIC BLOOD PRESSURE: 76 MMHG | TEMPERATURE: 99 F | HEIGHT: 60 IN | BODY MASS INDEX: 33.35 KG/M2 | WEIGHT: 169.88 LBS

## 2017-11-27 DIAGNOSIS — E78.00 PURE HYPERCHOLESTEROLEMIA: ICD-10-CM

## 2017-11-27 DIAGNOSIS — J45.20 MILD INTERMITTENT ASTHMA WITHOUT COMPLICATION: Primary | ICD-10-CM

## 2017-11-27 DIAGNOSIS — E55.9 VITAMIN D INSUFFICIENCY: ICD-10-CM

## 2017-11-27 DIAGNOSIS — E11.9 TYPE 2 DIABETES MELLITUS WITHOUT COMPLICATION, WITHOUT LONG-TERM CURRENT USE OF INSULIN: ICD-10-CM

## 2017-11-27 DIAGNOSIS — Z23 NEED FOR PNEUMOCOCCAL VACCINATION: ICD-10-CM

## 2017-11-27 DIAGNOSIS — M81.0 AGE-RELATED OSTEOPOROSIS WITHOUT CURRENT PATHOLOGICAL FRACTURE: ICD-10-CM

## 2017-11-27 DIAGNOSIS — E66.9 CLASS 1 OBESITY WITH SERIOUS COMORBIDITY AND BODY MASS INDEX (BMI) OF 33.0 TO 33.9 IN ADULT, UNSPECIFIED OBESITY TYPE: ICD-10-CM

## 2017-11-27 DIAGNOSIS — E11.9 COMPREHENSIVE DIABETIC FOOT EXAMINATION, TYPE 2 DM, ENCOUNTER FOR: ICD-10-CM

## 2017-11-27 DIAGNOSIS — Z00.00 HEALTHCARE MAINTENANCE: ICD-10-CM

## 2017-11-27 PROCEDURE — 90732 PPSV23 VACC 2 YRS+ SUBQ/IM: CPT | Mod: S$GLB,,, | Performed by: INTERNAL MEDICINE

## 2017-11-27 PROCEDURE — G0009 ADMIN PNEUMOCOCCAL VACCINE: HCPCS | Mod: S$GLB,,, | Performed by: INTERNAL MEDICINE

## 2017-11-27 PROCEDURE — 99215 OFFICE O/P EST HI 40 MIN: CPT | Mod: S$GLB,,, | Performed by: INTERNAL MEDICINE

## 2017-11-27 PROCEDURE — 99999 PR PBB SHADOW E&M-EST. PATIENT-LVL III: CPT | Mod: PBBFAC,,, | Performed by: INTERNAL MEDICINE

## 2017-11-27 RX ORDER — TORSEMIDE 20 MG/1
20 TABLET ORAL DAILY
Qty: 30 TABLET | Refills: 2 | Status: SHIPPED | OUTPATIENT
Start: 2017-11-27 | End: 2019-01-14 | Stop reason: SDUPTHER

## 2017-11-27 NOTE — PROGRESS NOTES
Subjective:             Patient ID: Breanna Silva is a 67 y.o. female.    Chief Complaint: Follow-up (blood work done at [facility].)    HPI  here today for follow-up reassessment and to go over her labs.  She has mild intermittent asthma without complication and reportedly has not been needing her rescue inhaler doing fine.  His offers alert and; her allergist is Dr. Rogers for 3 months now; she reportedly took it for 14 years.  Pure hypercholesterolemia: She is on a low-fat high-fiber diet mostly tolerating her Crestor fine.  Pre-hypertension: Watches her salt intake blood pressure here is 118/76.  Diabetes mellitus type 2: 9/11/17 she reported was saw Dr. Zaragoza ophthalmology for eye exam reportedly did fine.  Her CBGs have been averaging 90 in the morning; she watches her carbohydrate intake. FBS recently was 102; her diabetes is controlled on diet only.  She has hypothyroidism mild but is not needing any medications.       She has osteopenia with vitamin D insufficiency; takes her calcium and vitamin D; 7/12/17 DEXA revealed development of osteoporosis in the femoral neck to -2.5 T score; was -2.2; lumbar spine is slightly better from a -1.1 to -1.0; DEXA scan needs to be repeated in one year.  She claims that she is exercising 20 minutes daily with weight bearing exercises; she needs to try to increase this to 25-30 minutes 5 times a week.  She also needs to resume the Fosamax at 70 mg weekly.  Vitamin D level returned back 46 with a calcium of 9.3.  She has no history of any teeth problems.  On 7/12/17 she also had a mammogram which was negative for malignancy; recommended yearly mammogram with breast exams, pelvic, and Pap exams by her GYN Dr Vargas; she  has an appointment with him on 12/4/17.  8/8/17  total colonoscopy by Dr. Butler: 3-4 polyps removed; five-year follow-up recommended.  Total time 955 to 10:05 AM; greater then  50% of time spent on counseling, discussion, and review.  Labs reviewed  and discussed with patient, as well as labs ordered for follow-up.        Vaccines reviewed as well as healthcare maintenance noted above.  She is ALLERGIC to flu vaccine.  Tdap is declined. At 65 received Prevnar-13; needs PPSV-23. To receive today.    Review of Systems   Constitutional: Negative for appetite change, fever and unexpected weight change.   HENT: Negative for congestion, postnasal drip, rhinorrhea and sinus pressure.         Occ cough w seasonal allergies   Eyes: Negative for discharge and itching.   Respiratory: Positive for cough. Negative for chest tightness, shortness of breath and wheezing.         Rare cough w her allergies.   Cardiovascular: Positive for leg swelling. Negative for chest pain and palpitations.        When on her feet too long.    Gastrointestinal: Negative for abdominal distention, abdominal pain, blood in stool, constipation, diarrhea, nausea and vomiting.        Takes metamucil for regularity   Endocrine: Negative for polydipsia, polyphagia and polyuria.   Genitourinary: Negative for dysuria and hematuria.   Musculoskeletal: Positive for arthralgias and myalgias.        Needs to have neck surgery again; hx of 2 prior cervical fusions; chronic neck pain. Sees Dr Belkis harrison; surg Dr Johnson.   Skin: Negative for rash.   Allergic/Immunologic: Positive for environmental allergies. Negative for food allergies.        Hx of asthma.   Neurological: Negative for tremors, seizures and syncope.   Hematological: Negative for adenopathy. Does not bruise/bleed easily.   Psychiatric/Behavioral:        Denies anxiety or depression.       Objective:      Vitals:    11/27/17 0852   BP: 118/76   BP Location: Left arm   Patient Position: Sitting   BP Method: Large (Manual)   Pulse: 68   Temp: 99.3 °F (37.4 °C)   TempSrc: Oral   Weight: 77 kg (169 lb 13.8 oz)   Height: 5' (1.524 m)     Body mass index is 33.17 kg/m².    Physical Exam   Constitutional: She is oriented to person, place, and time.  She appears well-developed and well-nourished.   HENT:   Head: Normocephalic and atraumatic.   Eyes: EOM are normal.   Neck: Normal range of motion. Neck supple. No thyromegaly present.   Mild decrease in rotation and extension of neck; no C-sp tenderness to palp. No carotid bruits heard.   Cardiovascular: Normal rate, regular rhythm and normal heart sounds.  Exam reveals no gallop.    No murmur heard.  Pulses:       Dorsalis pedis pulses are 2+ on the right side, and 2+ on the left side.   Pulmonary/Chest: Effort normal and breath sounds normal. No respiratory distress. She has no wheezes. She has no rales.   Abdominal: Soft. Bowel sounds are normal. She exhibits no distension. There is no tenderness. There is no rebound and no guarding.   Musculoskeletal: Normal range of motion. She exhibits no edema.        Left foot: There is normal range of motion and no deformity.   Feet:   Right Foot:   Protective Sensation: 10 sites tested. 10 sites sensed.   Skin Integrity: Negative for ulcer, blister, skin breakdown or dry skin.   Left Foot:   Protective Sensation: 10 sites tested. 10 sites sensed.   Skin Integrity: Negative for ulcer, blister, skin breakdown or dry skin.   Lymphadenopathy:     She has no cervical adenopathy.   Neurological: She is alert and oriented to person, place, and time.   Moves all 4 extremities fine.   Skin: No rash noted.   Min callus noted medial first toes; acquaphor topical 1-2x a day.   Psychiatric: She has a normal mood and affect. Her behavior is normal. Thought content normal.   Vitals reviewed.      Assessment:       1. Mild intermittent asthma without complication    2. Pure hypercholesterolemia    3. Type 2 diabetes mellitus without complication, without long-term current use of insulin    4. Comprehensive diabetic foot examination, type 2 DM, encounter for    5. Vitamin D insufficiency    6. Age-related osteoporosis without current pathological fracture    7. Class 1 obesity with  serious comorbidity and body mass index (BMI) of 33.0 to 33.9 in adult, unspecified obesity type    8. Healthcare maintenance    9. Need for pneumococcal vaccination        Plan:       Mild intermittent asthma without complication; not needing her rescue inhaler; sees Dr Nelson; off Xolair for 3 mos now. Doing well.    Pure hypercholesterolemia. Maintain low fat high fiber diet, exercise regularly. Cont crestor; lipid levels w f/u.    Type 2 diabetes mellitus without complication, without long-term current use of insulin; had eye exam by Dr Clemente 9/11/17; to send us     a copy of his note. Diet controlled DM.         Maintain a low carb diet; monitor CBG's at home; keep a log for review.    Comprehensive diabetic foot examination, type 2 DM, encounter for foot exam performed    Vitamin D insufficiency; cont present Vit D supplementation.    Age-related osteoporosis without current pathological fracture. Weight bearing exercises; need to increase to 25-30 minutes 5x a week.as        Tolerated, continue calcium and vit D supplements. DEXA scan at 1 yr interval. Cont calcium w vit D as well.          Resume fosamax 70 mg at weekly intervals.     Class 1 obesity with serious comorbidity and body mass index (BMI) of 33.0 to 33.9 in adult, unspecified obesity type. Caloric restriction w      regular exercise and weight reduction.    Healthcare maintenance. Has annual wellness for medicare scheduled; 12/13/17. Yearly breast exam w mammo, pelvic and pap per her gyn.     TC due after 8/8/2022 by Dr Butler. Declines stool for OCB x3.  To receive pneumococcal vaccine.

## 2017-11-27 NOTE — PROGRESS NOTES
6/7/2017 116/66 BP 88P  5/3/2017 122/62BP 68 P  4/4/2017 122/64 BP 74 P  3/6/2017 124/74 BP 76 P  2/13/2017  120/70 BP 80 P  2/7/2017 130/66 BP 74 P  2/3/2017 132/64 BP 86 P  1/10/2017 132/62 BP 76 P  12/7/2016 108/70 BP  72 P  11/9/2016 122/70 BP 80 P  10/5/2016 100/60 BP 76 P    Pt brought in a sheet with BP and P readings from another [facility]era Office.

## 2017-11-27 NOTE — PATIENT INSTRUCTIONS
Mild intermittent asthma without complication; not needing her rescue inhaler; sees Dr Nelson; off Xolair for 3 mos now.    Pure hypercholesterolemia. Maintain low fat high fiber diet, exercise regularly. Cont crestor; lipid levels w f/u.  Type 2 diabetes mellitus without complication, without long-term current use of insulin; had eye exam by Dr coates 9/11/17; to send us a copy of his note.     Maintain a low carb diet; monitor CBG's at home; keep a log for review.    Comprehensive diabetic foot examination, type 2 DM, encounter for    Vitamin D insufficiency; cont present Vit D supplementation.    Age-related osteoporosis without current pathological fracture. Weight bearing exercises need to increase to 30 minutes 5x a week.as tolerated,    continue calcium and vit D supplements. DEXA scabn at 1 yr interval. Cont calcium w vit D as well.     Resume fosamax 70 mg at weekly intervals.     Class 1 obesity with serious comorbidity and body mass index (BMI) of 33.0 to 33.9 in adult, unspecified obesity type. Caloric restriction w regular exercise and weight reduction.    Healthcare maintenance. Has annual wellness for medicare scheduled; 12/13/17. Yearly breast exam w mammo, pelvic and pap per her gyn.     TC due after 8/8/2022 by Dr Butler. Declines stool for OCB x3.    RTC in 6 mos w labs 10 days prior as fasting.

## 2017-12-13 ENCOUNTER — OFFICE VISIT (OUTPATIENT)
Dept: FAMILY MEDICINE | Facility: CLINIC | Age: 67
End: 2017-12-13
Payer: MEDICARE

## 2017-12-13 VITALS
HEIGHT: 60 IN | DIASTOLIC BLOOD PRESSURE: 62 MMHG | SYSTOLIC BLOOD PRESSURE: 116 MMHG | HEART RATE: 82 BPM | BODY MASS INDEX: 33.76 KG/M2 | WEIGHT: 171.94 LBS

## 2017-12-13 DIAGNOSIS — Z00.00 ENCOUNTER FOR PREVENTIVE HEALTH EXAMINATION: Primary | ICD-10-CM

## 2017-12-13 DIAGNOSIS — E55.9 VITAMIN D INSUFFICIENCY: ICD-10-CM

## 2017-12-13 DIAGNOSIS — E04.1 THYROID NODULE: ICD-10-CM

## 2017-12-13 DIAGNOSIS — R03.0 PRE-HYPERTENSION: ICD-10-CM

## 2017-12-13 DIAGNOSIS — J45.20 MILD INTERMITTENT ASTHMA WITHOUT COMPLICATION: ICD-10-CM

## 2017-12-13 DIAGNOSIS — E11.9 TYPE 2 DIABETES MELLITUS WITHOUT COMPLICATION, WITHOUT LONG-TERM CURRENT USE OF INSULIN: ICD-10-CM

## 2017-12-13 DIAGNOSIS — E78.00 PURE HYPERCHOLESTEROLEMIA: ICD-10-CM

## 2017-12-13 DIAGNOSIS — M81.0 AGE-RELATED OSTEOPOROSIS WITHOUT CURRENT PATHOLOGICAL FRACTURE: ICD-10-CM

## 2017-12-13 DIAGNOSIS — E03.9 HYPOTHYROIDISM, UNSPECIFIED TYPE: ICD-10-CM

## 2017-12-13 PROCEDURE — 99999 PR PBB SHADOW E&M-EST. PATIENT-LVL IV: CPT | Mod: PBBFAC,,, | Performed by: NURSE PRACTITIONER

## 2017-12-13 PROCEDURE — G0439 PPPS, SUBSEQ VISIT: HCPCS | Mod: S$GLB,,, | Performed by: NURSE PRACTITIONER

## 2017-12-13 RX ORDER — ACETAMINOPHEN 160 MG/5ML
200 SUSPENSION, ORAL (FINAL DOSE FORM) ORAL DAILY
COMMUNITY

## 2017-12-13 NOTE — PROGRESS NOTES
Breanna Silva presented for a  Medicare AWV and comprehensive Health Risk Assessment today. The following components were reviewed and updated:    · Medical history  · Family History  · Social history  · Allergies and Current Medications  · Health Risk Assessment  · Health Maintenance  · Care Team     Review of Systems   Constitutional: Negative for chills, fever, malaise/fatigue and weight loss.   Respiratory: Negative for cough and shortness of breath.    Cardiovascular: Negative for chest pain.   Gastrointestinal: Negative for abdominal pain, blood in stool, constipation, diarrhea, nausea and vomiting.   Skin: Negative for rash.   Neurological: Negative for dizziness and headaches.     ** See Completed Assessments for Annual Wellness Visit within the encounter summary.**     The following assessments were completed:  · Living Situation  · CAGE  · Depression Screening  · Timed Get Up and Go  · Whisper Test  · Cognitive Function Screening      · Nutrition Screening  · ADL Screening  · PAQ Screening    Vitals:    12/13/17 0908   BP: 116/62   BP Location: Left arm   Patient Position: Sitting   BP Method: Medium (Automatic)   Pulse: 82   Weight: 78 kg (171 lb 15.3 oz)   Height: 5' (1.524 m)     Body mass index is 33.58 kg/m².  Physical Exam   Constitutional: She is oriented to person, place, and time. She appears well-nourished.   Cardiovascular: Normal rate, regular rhythm, normal heart sounds and intact distal pulses.    Pulmonary/Chest: Effort normal and breath sounds normal.   Neurological: She is alert and oriented to person, place, and time.   Skin: Skin is warm and dry. No rash noted.   Vitals reviewed.        Diagnoses and health risks identified today and associated recommendations/orders:    1. Encounter for preventive health examination  Reviewed and discussed health maintenance.   Declined zoster and tetanus today. Will discuss with allergist (Dr. Nelson)    2. Pre-hypertension  Stable- continue current  treatment and follow up routinely with PCP     3. Pure hypercholesterolemia  Stable- continue current treatment and follow up routinely with PCP     4. Mild intermittent asthma without complication  Stable- continue current treatment and follow up routinely with PCP and allergist (Dr. Nelson)    5. Vitamin D insufficiency  Stable- continue current treatment and follow up routinely with PCP     6. Thyroid nodule  Stable- continue current treatment and follow up routinely with PCP     7. Hypothyroidism, unspecified type  Stable- continue current treatment and follow up routinely with PCP     8. Type 2 diabetes mellitus without complication, without long-term current use of insulin  Stable- continue current treatment and follow up routinely with PCP   Labs reviewed. A1c= 5.8    9. Age-related osteoporosis without current pathological fracture  Stable- continue current treatment and follow up routinely with PCP   DEXA 11/2017    10. BMI 33.0-33.9,adult  Encouraged healthy eating, weight loss and routine exercise     Provided Breanna with a 5-10 year written screening schedule and personal prevention plan. Recommendations were developed using the USPSTF age appropriate recommendations. Education, counseling, and referrals were provided as needed. After Visit Summary printed and given to patient which includes a list of additional screenings\tests needed.    Patria Yang NP

## 2017-12-13 NOTE — PATIENT INSTRUCTIONS
Counseling and Referral of Other Preventative  (Italic type indicates deductible and co-insurance are waived)    Patient Name: Breanna Silva  Today's Date: 12/13/2017      SERVICE LIMITATIONS RECOMMENDATION    Vaccines    · Pneumococcal (once after 65)    · Influenza (annually)    · Hepatitis B (if medium/high risk)    · Prevnar 13      Hepatitis B medium/high risk factors:       - End-stage renal disease       - Hemophiliacs who received Factor VII or         IX concentrates       - Clients of institutions for the mentally             retarded       - Persons who live in the same house as          a HepB carrier       - Homosexual men       - Illicit injectable drug abusers     Pneumococcal: Done, no repeat necessary     Influenza: N/A     Hepatitis B: N/A     Prevnar 13: Recommended to patient  11/2018    Mammogram (biennial age 50-74)  Annually (age 40 or over)  Done this year, repeat every year 7/2017    Pap (up to age 70 and after 70 if unknown history or abnormal study last 10 years)    Done this year, repeat every year     The USPSTF recommends against screening for cervical cancer in women older than age 65 years who have had adequate prior screening and are not otherwise at high risk for cervical cancer.      Colorectal cancer screening (to age 75)    · Fecal occult blood test (annual)  · Flexible sigmoidoscopy (5y)  · Screening colonoscopy (10y)  · Barium enema   Last done 8/2017, recommend to repeat every 5  years (2022)    Diabetes self-management training (no USPSTF recommendations)  Requires referral by treating physician for patient with diabetes or renal disease. 10 hours of initial DSMT sessions of no less than 30 minutes each in a continuous 12-month period. 2 hours of follow-up DSMT in subsequent years.  N/A    Bone mass measurements (age 65 & older, biennial)  Requires diagnosis related to osteoporosis or estrogen deficiency. Biennial benefit unless patient has history of long-term  glucocorticoid  Last done 7/2017, recommend to repeat every 1- 2  years     Glaucoma screening (no USPSTF recommendation)  Diabetes mellitus, family history   , age 50 or over    American, age 65 or over  Recommend follow up with eye care professional regularly         Medical nutrition therapy for diabetes or renal disease (no recommended schedule)  Requires referral by treating physician for patient with diabetes or renal disease or kidney transplant within the past 3 years.  Can be provided in same year as diabetes self-management training (DSMT), and CMS recommends medical nutrition therapy take place after DSMT. Up to 3 hours for initial year and 2 hours in subsequent years.  N/A    Cardiovascular screening blood tests (every 5 years)  · Fasting lipid panel  Order as a panel if possible  Done this year, repeat every year     11/2017    Diabetes screening tests (at least every 3 years, Medicare covers annually or at 6-month intervals for prediabetic patients)  · Fasting blood sugar (FBS) or glucose tolerance test (GTT)  Patient must be diagnosed with one of the following:       - Hypertension       - Dyslipidemia       - Obesity (BMI 30kg/m2)       - Previous elevated impaired FBS or GTT       ... or any two of the following:       - Overweight (BMI 25 but <30)       - Family history of diabetes       - Age 65 or older       - History of gestational diabetes or birth of baby weighing more than 9 pounds  Done this year, repeat every year     11/2017    HIV screening (annually for increased risk patients)  · HIV-1 and HIV-2 by EIA, or JENNIFER, rapid antibody test or oral mucosa transudate  Patients must be at increased risk for HIV infection per USPSTF guidelines or pregnant. Tests covered annually for patient at increased risk or as requested by the patient. Pregnant patients may receive up to 3 tests during pregnancy.  Risks discussed, screening is not recommended    Smoking  cessation counseling (up to 8 sessions per year)  Patients must be asymptomatic of tobacco-related conditions to receive as a preventative service.  Non-smoker    Subsequent annual wellness visit  At least 12 months since last AWV  Return in one year     The following information is provided to all patients.  This information is to help you find resources for any of the problems found today that may be affecting your health:                Living healthy guide: www.[facility].[place].[facility]      Understanding Diabetes: www.diabetes.org      Eating healthy: www.cdc.gov/healthyweight      [facility] home safety checklist: www.cdc.gov/steadi/patient.html      Agency on Aging: www.goea.[place].[facility]      Alcoholics anonymous (AA): www.aa.org      Physical Activity: www.virgilio.nih.gov/ms3zoym      Tobacco use: www.quitwithusla.org

## 2018-05-16 ENCOUNTER — LAB VISIT (OUTPATIENT)
Dept: LAB | Facility: HOSPITAL | Age: 68
End: 2018-05-16
Attending: INTERNAL MEDICINE
Payer: MEDICARE

## 2018-05-16 DIAGNOSIS — E55.9 VITAMIN D INSUFFICIENCY: ICD-10-CM

## 2018-05-16 DIAGNOSIS — M81.0 AGE-RELATED OSTEOPOROSIS WITHOUT CURRENT PATHOLOGICAL FRACTURE: ICD-10-CM

## 2018-05-16 DIAGNOSIS — E11.9 TYPE 2 DIABETES MELLITUS WITHOUT COMPLICATION, WITHOUT LONG-TERM CURRENT USE OF INSULIN: ICD-10-CM

## 2018-05-16 DIAGNOSIS — E78.00 PURE HYPERCHOLESTEROLEMIA: ICD-10-CM

## 2018-05-16 LAB
25(OH)D3+25(OH)D2 SERPL-MCNC: 44 NG/ML
ALBUMIN SERPL BCP-MCNC: 3.8 G/DL
ALP SERPL-CCNC: 54 U/L
ALT SERPL W/O P-5'-P-CCNC: 20 U/L
ANION GAP SERPL CALC-SCNC: 9 MMOL/L
AST SERPL-CCNC: 22 U/L
BILIRUB SERPL-MCNC: 0.4 MG/DL
BUN SERPL-MCNC: 14 MG/DL
CALCIUM SERPL-MCNC: 9.5 MG/DL
CHLORIDE SERPL-SCNC: 106 MMOL/L
CHOLEST SERPL-MCNC: 185 MG/DL
CHOLEST/HDLC SERPL: 2.3 {RATIO}
CO2 SERPL-SCNC: 28 MMOL/L
CREAT SERPL-MCNC: 0.8 MG/DL
EST. GFR  (AFRICAN AMERICAN): >60 ML/MIN/1.73 M^2
EST. GFR  (NON AFRICAN AMERICAN): >60 ML/MIN/1.73 M^2
GLUCOSE SERPL-MCNC: 126 MG/DL
HDLC SERPL-MCNC: 82 MG/DL
HDLC SERPL: 44.3 %
LDLC SERPL CALC-MCNC: 88.8 MG/DL
MAGNESIUM SERPL-MCNC: 2.1 MG/DL
NONHDLC SERPL-MCNC: 103 MG/DL
POTASSIUM SERPL-SCNC: 4 MMOL/L
PROT SERPL-MCNC: 7.4 G/DL
SODIUM SERPL-SCNC: 143 MMOL/L
TRIGL SERPL-MCNC: 71 MG/DL

## 2018-05-16 PROCEDURE — 80053 COMPREHEN METABOLIC PANEL: CPT

## 2018-05-16 PROCEDURE — 80061 LIPID PANEL: CPT

## 2018-05-16 PROCEDURE — 82306 VITAMIN D 25 HYDROXY: CPT

## 2018-05-16 PROCEDURE — 36415 COLL VENOUS BLD VENIPUNCTURE: CPT | Mod: PO

## 2018-05-16 PROCEDURE — 83735 ASSAY OF MAGNESIUM: CPT

## 2018-05-22 ENCOUNTER — OFFICE VISIT (OUTPATIENT)
Dept: FAMILY MEDICINE | Facility: CLINIC | Age: 68
End: 2018-05-22
Payer: MEDICARE

## 2018-05-22 VITALS
TEMPERATURE: 98 F | HEART RATE: 76 BPM | OXYGEN SATURATION: 95 % | SYSTOLIC BLOOD PRESSURE: 122 MMHG | BODY MASS INDEX: 33.58 KG/M2 | DIASTOLIC BLOOD PRESSURE: 80 MMHG | HEIGHT: 60 IN | WEIGHT: 171.06 LBS

## 2018-05-22 DIAGNOSIS — J45.20 MILD INTERMITTENT ASTHMA WITHOUT COMPLICATION: ICD-10-CM

## 2018-05-22 DIAGNOSIS — E55.9 VITAMIN D INSUFFICIENCY: ICD-10-CM

## 2018-05-22 DIAGNOSIS — E11.9 TYPE 2 DIABETES MELLITUS WITHOUT COMPLICATION, WITHOUT LONG-TERM CURRENT USE OF INSULIN: Primary | ICD-10-CM

## 2018-05-22 DIAGNOSIS — Z23 NEED FOR DIPHTHERIA-TETANUS-PERTUSSIS (TDAP) VACCINE: ICD-10-CM

## 2018-05-22 DIAGNOSIS — E78.00 PURE HYPERCHOLESTEROLEMIA: ICD-10-CM

## 2018-05-22 DIAGNOSIS — E66.9 CLASS 1 OBESITY WITH SERIOUS COMORBIDITY AND BODY MASS INDEX (BMI) OF 33.0 TO 33.9 IN ADULT, UNSPECIFIED OBESITY TYPE: ICD-10-CM

## 2018-05-22 DIAGNOSIS — E04.1 THYROID NODULE: ICD-10-CM

## 2018-05-22 DIAGNOSIS — M81.0 AGE-RELATED OSTEOPOROSIS WITHOUT CURRENT PATHOLOGICAL FRACTURE: ICD-10-CM

## 2018-05-22 PROCEDURE — 99999 PR PBB SHADOW E&M-EST. PATIENT-LVL III: CPT | Mod: PBBFAC,,, | Performed by: INTERNAL MEDICINE

## 2018-05-22 PROCEDURE — 99214 OFFICE O/P EST MOD 30 MIN: CPT | Mod: S$GLB,,, | Performed by: INTERNAL MEDICINE

## 2018-05-22 PROCEDURE — 3044F HG A1C LEVEL LT 7.0%: CPT | Mod: CPTII,S$GLB,, | Performed by: INTERNAL MEDICINE

## 2018-05-22 NOTE — PATIENT INSTRUCTIONS
Type 2 diabetes mellitus without complication, without long-term current use of insulin. Maintain a low carb diet; monitor CBG's at home; keep a log for review.  Diet controlled; weight reduction needed. Eye exam due in Sept., 2018. Foot exam due in Nov, 2018. HgA1C/FBS w f/u.    Pure hypercholesterolemia. Maintain low fat high fiber diet, exercise regularly. Cont rosuvastatin and metamucil.    Mild intermittent asthma without complication; not needing her rescue inhaler. Has been stable. Weight reduction will help.    Thyroid nodule; needs thyroid US updated before f/u..    Age-related osteoporosis without current pathological fracture. Weight bearing exercises, continue calcium and vit D supplements. DEXA scan after 7/12/18.    Vitamin D insufficiency; stable  w current levels. Cont supplements    Class 1 obesity with serious comorbidity and body mass index (BMI) of 33.0 to 33.9 in adult, unspecified obesity type. Caloric restriction w regular exercise and weight reduction.    Need for diphtheria-tetanus-pertussis (Tdap) vaccine; script given to omar

## 2018-05-22 NOTE — PROGRESS NOTES
Subjective:       Patient ID: Breanna Silva is a 67 y.o. female.    Chief Complaint: Follow-up (labs )    HPI  Overall she's been doing fine.   Asthma: has been stable. Not needing to use her rescue inhaler.  DM2: CBG's avr 105-110 in am. Watches her carbs.   HLP; on low fat high fiber diet; on metamucil and rosuvastatin; tolerated fine.  Hypothyroidism:no longer takes supplements; 11/21/17 thyroid screen wnl.  Thyroid nodule: 10/10/16 US w stable subcentimeter L thyroid nodule. Needs thyroid US update.  Osteoporosis; treadmill 2x a week; goal 5x a week min 25-30 min. On fosamax, calcium and vit D.5; 7/12/17 DEXA w Lsp -1.0. DEXA due around 7/12/18.    Review of Systems   Constitutional: Negative for appetite change, fever and unexpected weight change.   HENT: Negative for congestion, postnasal drip, rhinorrhea and sinus pressure.    Eyes: Negative for discharge and itching.   Respiratory: Negative for cough, chest tightness, shortness of breath and wheezing.    Cardiovascular: Negative for chest pain, palpitations and leg swelling.   Gastrointestinal: Negative for abdominal distention, abdominal pain, blood in stool, constipation, diarrhea, nausea and vomiting.   Endocrine: Negative for polydipsia, polyphagia and polyuria.   Genitourinary: Negative for dysuria and hematuria.   Musculoskeletal: Negative for arthralgias and myalgias.   Skin: Negative for rash.   Allergic/Immunologic: Negative for environmental allergies and food allergies.   Neurological: Negative for tremors, seizures and syncope.   Hematological: Negative for adenopathy. Does not bruise/bleed easily.   Psychiatric/Behavioral:        Denies anxiety or depression.       Objective:      Vitals:    05/22/18 0811   BP: 122/80   BP Location: Left arm   Patient Position: Sitting   BP Method: Medium (Manual)   Pulse: 76   Temp: 98 °F (36.7 °C)   TempSrc: Oral   SpO2: 95%   Weight: 77.6 kg (171 lb 1.2 oz)   Height: 5' (1.524 m)     Body mass index is  33.41 kg/m².    Physical Exam   Constitutional: She is oriented to person, place, and time. She appears well-developed and well-nourished.   HENT:   Head: Normocephalic and atraumatic.   Eyes: EOM are normal.   Neck: Normal range of motion. Neck supple. No thyromegaly present.   Cardiovascular: Normal rate, regular rhythm and normal heart sounds.  Exam reveals no gallop.    No murmur heard.  Pulmonary/Chest: Effort normal and breath sounds normal. No respiratory distress. She has no wheezes. She has no rales.   Min rare exp wheeze heard.   Abdominal: Soft. Bowel sounds are normal. She exhibits no distension. There is no tenderness. There is no rebound and no guarding.   Musculoskeletal: Normal range of motion. She exhibits no edema.   Lymphadenopathy:     She has no cervical adenopathy.   Neurological: She is alert and oriented to person, place, and time.   Moves all 4 extremities fine.   Skin: No rash noted.   Psychiatric: She has a normal mood and affect. Her behavior is normal. Thought content normal.   Vitals reviewed.      Assessment:       1. Type 2 diabetes mellitus without complication, without long-term current use of insulin    2. Pure hypercholesterolemia    3. Mild intermittent asthma without complication    4. Thyroid nodule    5. Age-related osteoporosis without current pathological fracture    6. Vitamin D insufficiency    7. Class 1 obesity with serious comorbidity and body mass index (BMI) of 33.0 to 33.9 in adult, unspecified obesity type    8. Need for diphtheria-tetanus-pertussis (Tdap) vaccine        Plan:       Type 2 diabetes mellitus without complication, without long-term current use of insulin. Maintain a low carb diet; monitor CBG's at home; keep a log for review.  Diet controlled; weight reduction needed. Eye exam due in Sept., 2018. Foot exam due in Nov, 2018. HgA1C/FBS w f/u.    Pure hypercholesterolemia. Maintain low fat high fiber diet, exercise regularly. Cont rosuvastatin and  metamucil.    Mild intermittent asthma without complication; not needing her rescue inhaler. Has been stable. Weight reduction will help.    Thyroid nodule; needs thyroid US updated before f/u..    Age-related osteoporosis without current pathological fracture. Weight bearing exercises, continue calcium and vit D supplements. DEXA scan after 7/12/18.    Vitamin D insufficiency; stable  w current levels. Cont supplements    Class 1 obesity with serious comorbidity and body mass index (BMI) of 33.0 to 33.9 in adult, unspecified obesity type. Caloric restriction w regular exercise and weight reduction.    Need for diphtheria-tetanus-pertussis (Tdap) vaccine; script given to omar

## 2018-05-30 ENCOUNTER — HOSPITAL ENCOUNTER (OUTPATIENT)
Dept: RADIOLOGY | Facility: HOSPITAL | Age: 68
Discharge: HOME | End: 2018-05-30
Attending: INTERNAL MEDICINE
Payer: MEDICARE

## 2018-05-30 DIAGNOSIS — E04.1 THYROID NODULE: ICD-10-CM

## 2018-05-30 PROCEDURE — 76536 US EXAM OF HEAD AND NECK: CPT | Mod: 26,,, | Performed by: RADIOLOGY

## 2018-05-30 PROCEDURE — 76536 US EXAM OF HEAD AND NECK: CPT | Mod: TC,PO

## 2018-06-04 ENCOUNTER — LAB VISIT (OUTPATIENT)
Dept: LAB | Facility: HOSPITAL | Age: 68
End: 2018-06-04
Attending: INTERNAL MEDICINE
Payer: MEDICARE

## 2018-06-04 DIAGNOSIS — E11.9 TYPE 2 DIABETES MELLITUS WITHOUT COMPLICATION, WITHOUT LONG-TERM CURRENT USE OF INSULIN: ICD-10-CM

## 2018-06-04 LAB
ESTIMATED AVG GLUCOSE: 134 MG/DL
GLUCOSE SERPL-MCNC: 112 MG/DL
HBA1C MFR BLD HPLC: 6.3 %

## 2018-06-04 PROCEDURE — 36415 COLL VENOUS BLD VENIPUNCTURE: CPT | Mod: PO

## 2018-06-04 PROCEDURE — 83036 HEMOGLOBIN GLYCOSYLATED A1C: CPT

## 2018-06-04 PROCEDURE — 82947 ASSAY GLUCOSE BLOOD QUANT: CPT

## 2018-07-03 ENCOUNTER — TELEPHONE (OUTPATIENT)
Dept: FAMILY MEDICINE | Facility: CLINIC | Age: 68
End: 2018-07-03

## 2018-07-03 ENCOUNTER — OFFICE VISIT (OUTPATIENT)
Dept: FAMILY MEDICINE | Facility: CLINIC | Age: 68
End: 2018-07-03
Payer: MEDICARE

## 2018-07-03 VITALS
TEMPERATURE: 99 F | WEIGHT: 173.5 LBS | DIASTOLIC BLOOD PRESSURE: 66 MMHG | SYSTOLIC BLOOD PRESSURE: 126 MMHG | BODY MASS INDEX: 33.88 KG/M2 | HEART RATE: 80 BPM

## 2018-07-03 DIAGNOSIS — R60.0 LOWER LEG EDEMA: ICD-10-CM

## 2018-07-03 DIAGNOSIS — E78.00 PURE HYPERCHOLESTEROLEMIA: ICD-10-CM

## 2018-07-03 DIAGNOSIS — Z12.31 BREAST CANCER SCREENING BY MAMMOGRAM: ICD-10-CM

## 2018-07-03 DIAGNOSIS — J45.20 MILD INTERMITTENT ASTHMA WITHOUT COMPLICATION: ICD-10-CM

## 2018-07-03 DIAGNOSIS — E04.2 MULTIPLE THYROID NODULES: Primary | ICD-10-CM

## 2018-07-03 DIAGNOSIS — M81.0 AGE-RELATED OSTEOPOROSIS WITHOUT CURRENT PATHOLOGICAL FRACTURE: ICD-10-CM

## 2018-07-03 DIAGNOSIS — Z00.00 HEALTHCARE MAINTENANCE: ICD-10-CM

## 2018-07-03 DIAGNOSIS — E11.9 TYPE 2 DIABETES MELLITUS WITHOUT COMPLICATION, WITHOUT LONG-TERM CURRENT USE OF INSULIN: ICD-10-CM

## 2018-07-03 DIAGNOSIS — E66.09 CLASS 1 OBESITY DUE TO EXCESS CALORIES WITH SERIOUS COMORBIDITY AND BODY MASS INDEX (BMI) OF 33.0 TO 33.9 IN ADULT: ICD-10-CM

## 2018-07-03 DIAGNOSIS — E55.9 VITAMIN D INSUFFICIENCY: ICD-10-CM

## 2018-07-03 PROCEDURE — 99999 PR PBB SHADOW E&M-EST. PATIENT-LVL III: CPT | Mod: PBBFAC,,, | Performed by: INTERNAL MEDICINE

## 2018-07-03 PROCEDURE — 3044F HG A1C LEVEL LT 7.0%: CPT | Mod: CPTII,S$GLB,, | Performed by: INTERNAL MEDICINE

## 2018-07-03 PROCEDURE — 99214 OFFICE O/P EST MOD 30 MIN: CPT | Mod: S$GLB,,, | Performed by: INTERNAL MEDICINE

## 2018-07-03 NOTE — PROGRESS NOTES
Subjective:       Patient ID: Breanna Silva is a 67 y.o. female.    Chief Complaint: Follow-up    HPI  Overall doing fine.   HLP: on low fat high fiber diet; tolerates rosuvastatin fine.  DM2; CBG's avr . Watches her carbs; diet controlled.  Hypothyroidism: not on any supplements; thyroid nodule w US f/u. 2 subtle subcentimeter nodules picked up not seen on 10/2016 US; will repeat US in 6 mos close for following.  Osteoporosis; DEXA reviewed w pt; due repeat before f/u; on alendronate; taking calcium and vit D.  Total time: 950-1035; >50% time spent on counseling, discussion, and review.    Review of Systems   Constitutional: Negative for appetite change and fever.   HENT: Negative for congestion, postnasal drip, rhinorrhea and sinus pressure.    Eyes: Negative for discharge and itching.   Respiratory: Negative for cough, chest tightness, shortness of breath and wheezing.    Cardiovascular: Negative for chest pain, palpitations and leg swelling.   Gastrointestinal: Negative for abdominal distention, abdominal pain, blood in stool, constipation, diarrhea, nausea and vomiting.   Endocrine: Negative for polydipsia, polyphagia and polyuria.        No swallowing problems.   Genitourinary: Negative for dysuria and hematuria.   Musculoskeletal: Negative for arthralgias and myalgias.   Skin: Negative for rash.   Allergic/Immunologic: Negative for environmental allergies and food allergies.   Neurological: Negative for tremors, seizures and syncope.   Hematological: Negative for adenopathy. Does not bruise/bleed easily.   Psychiatric/Behavioral:        Denies anxiety or depression.       Objective:      Vitals:    07/03/18 0928   BP: 126/66   Pulse: 80   Temp: 98.5 °F (36.9 °C)   Weight: 78.7 kg (173 lb 8 oz)     Body mass index is 33.88 kg/m².    Physical Exam   Constitutional: She is oriented to person, place, and time. She appears well-developed and well-nourished.   HENT:   Head: Normocephalic and atraumatic.    Eyes: EOM are normal.   Neck: Normal range of motion. Neck supple. No thyromegaly present.   Cardiovascular: Normal rate, regular rhythm and normal heart sounds.  Exam reveals no gallop.    No murmur heard.  Pulmonary/Chest: Effort normal and breath sounds normal. No respiratory distress. She has no wheezes. She has no rales.   Abdominal: Soft. Bowel sounds are normal. She exhibits no distension. There is no tenderness. There is no rebound and no guarding.   Musculoskeletal: Normal range of motion. She exhibits no edema.   Lymphadenopathy:     She has no cervical adenopathy.   Neurological: She is alert and oriented to person, place, and time.   Moves all 4 extremities fine.   Skin: No rash noted.   Psychiatric: She has a normal mood and affect. Her behavior is normal. Thought content normal.   Vitals reviewed.      Assessment:       1. Multiple thyroid nodules    2. Type 2 diabetes mellitus without complication, without long-term current use of insulin    3. Pure hypercholesterolemia    4. Mild intermittent asthma without complication    5. Class 1 obesity due to excess calories with serious comorbidity and body mass index (BMI) of 33.0 to 33.9 in adult    6. Lower leg edema    7. Age-related osteoporosis without current pathological fracture    8. Vitamin D insufficiency    9. Healthcare maintenance    10. Breast cancer screening by mammogram        Plan:       Multiple thyroid nodules; US f/u reassessment in 6 mos.  -     TSH; Future; Expected date: 07/03/2018  -     T4, free; Future; Expected date: 07/03/2018  -     T3, free; Future; Expected date: 07/03/2018  -     US Soft Tissue Head Neck Thyroid; Future; Expected date: 07/03/2018    Type 2 diabetes mellitus without complication, without long-term current use of insulin; Maintain a low carb diet; monitor CBG's at home; keep a log for review. Diet controlled; off metformin 2 yrs w weight reduction.  -     Lipid panel; Future; Expected date: 07/03/2018  -      Hemoglobin A1c; Future; Expected date: 07/03/2018  -     Comprehensive metabolic panel; Future; Expected date: 07/03/2018    Pure hypercholesterolemia. Maintain low fat high fiber diet, exercise regularly. On rosuvastatin.  -     Lipid panel; Future; Expected date: 07/03/2018  -     Comprehensive metabolic panel; Future; Expected date: 07/03/2018    Mild intermittent asthma without complication; instructed on proper inhaler use. Albuterol for rescue if needed.  -     CBC auto differential; Future; Expected date: 07/03/2018    Class 1 obesity due to excess calories with serious comorbidity and body mass index (BMI) of 33.0 to 33.9 in adult. Caloric restriction w regular exercise and weight reduction.  -     Comprehensive metabolic panel; Future; Expected date: 07/03/2018    Lower leg edema; has cleared w weight reduction and torsemide.  -     Magnesium; Future; Expected date: 07/03/2018    Breast cancer screening by mammogram; mammogram screen ordered; sees her GYN Dr Vargas.for routine breast exams and pelvic and paps.    Osteoporosis; DEXA and vit D levels needed and ordered. Weight bearing exercises, continue calcium and vit D supplements. On alendronate.

## 2018-07-03 NOTE — PATIENT INSTRUCTIONS
Multiple thyroid nodules; US f/u reassessment in 6 mos.  -     TSH; Future; Expected date: 07/03/2018  -     T4, free; Future; Expected date: 07/03/2018  -     T3, free; Future; Expected date: 07/03/2018  -     US Soft Tissue Head Neck Thyroid; Future; Expected date: 07/03/2018    Type 2 diabetes mellitus without complication, without long-term current use of insulin; Maintain a low carb diet; monitor CBG's at home; keep a log for review. Diet controlled; off metformin 2 yrs w weight reduction.  -     Lipid panel; Future; Expected date: 07/03/2018  -     Hemoglobin A1c; Future; Expected date: 07/03/2018  -     Comprehensive metabolic panel; Future; Expected date: 07/03/2018    Pure hypercholesterolemia. Maintain low fat high fiber diet, exercise regularly. On rosuvastatin.  -     Lipid panel; Future; Expected date: 07/03/2018  -     Comprehensive metabolic panel; Future; Expected date: 07/03/2018    Mild intermittent asthma without complication; instructed on proper inhaler use. Albuterol for rescue if needed.  -     CBC auto differential; Future; Expected date: 07/03/2018    Class 1 obesity due to excess calories with serious comorbidity and body mass index (BMI) of 33.0 to 33.9 in adult. Caloric restriction w regular exercise and weight reduction.  -     Comprehensive metabolic panel; Future; Expected date: 07/03/2018    Lower leg edema; has cleared w weight reduction and torsemide.  -     Magnesium; Future; Expected date: 07/03/2018    Breast cancer screening by mammogram; mammogram screen ordered; sees her GYN Dr Vargas.for routine breast exams and pelvic and paps.    Osteoporosis; DEXA and vit D levels needed and ordered. Weight bearing exercises, continue calcium and vit D supplements. On alendronate.

## 2018-08-13 NOTE — TELEPHONE ENCOUNTER
----- Message from Adelina Franco sent at 8/13/2018  8:45 AM CDT -----  Contact: Breanna  Type:  RX Refill Request    Who Called:  patient  Refill or New Rx:  refill  RX Name and Strength:  rosuvastatin (CRESTOR) 10 MG tablet  How is the patient currently taking it? (ex. 1XDay):  Take 1 tablet (10 mg total) by mouth every evening  Is this a 30 day or 90 day RX:  30  Preferred Pharmacy with phone number:    [address] - [address]  [address]  [facility] [address]  Phone: [phone] Fax: [phone]  Local or Mail Order:  Local   Ordering Provider:  Dr Nguyễn Nicholas  Best Call Back Number:  [phone]  Additional Information:   Patient is scheduled for fasting labs 12/31/18 for 6 month follow up 1/7/19 and Ultrasound needs to be scheduled. Thanks!

## 2018-08-13 NOTE — TELEPHONE ENCOUNTER
Ultrasound scheduled fr November. Patient called requesting refill on medication to preferred pharmacy. Last Office Visit (date: 7/3/18)  Next Office Visit (date: 1/7/19) . Please advise.

## 2018-08-15 RX ORDER — ROSUVASTATIN CALCIUM 10 MG/1
10 TABLET, COATED ORAL NIGHTLY
Qty: 30 TABLET | Refills: 2 | Status: SHIPPED | OUTPATIENT
Start: 2018-08-15 | End: 2019-07-20 | Stop reason: SDUPTHER

## 2018-08-27 ENCOUNTER — TELEPHONE (OUTPATIENT)
Dept: FAMILY MEDICINE | Facility: CLINIC | Age: 68
End: 2018-08-27

## 2018-08-27 NOTE — TELEPHONE ENCOUNTER
----- Message from Jade Kirkland sent at 8/27/2018 11:52 AM CDT -----  Contact: self  Type:  Patient Returning Call    Who Called:patient   Who Left Message for Patient: Alanis  Does the patient know what this is regarding?:  Returned call  Best Call Back Number: [phone]    Additional Information:

## 2018-09-10 ENCOUNTER — OFFICE VISIT (OUTPATIENT)
Dept: FAMILY MEDICINE | Facility: CLINIC | Age: 68
End: 2018-09-10
Payer: MEDICARE

## 2018-09-10 VITALS
BODY MASS INDEX: 34.06 KG/M2 | WEIGHT: 173.5 LBS | HEIGHT: 60 IN | SYSTOLIC BLOOD PRESSURE: 130 MMHG | DIASTOLIC BLOOD PRESSURE: 78 MMHG | TEMPERATURE: 98 F | HEART RATE: 72 BPM

## 2018-09-10 DIAGNOSIS — E11.9 TYPE 2 DIABETES MELLITUS WITHOUT COMPLICATION, WITHOUT LONG-TERM CURRENT USE OF INSULIN: ICD-10-CM

## 2018-09-10 DIAGNOSIS — E04.2 MULTIPLE THYROID NODULES: ICD-10-CM

## 2018-09-10 DIAGNOSIS — M81.0 AGE-RELATED OSTEOPOROSIS WITHOUT CURRENT PATHOLOGICAL FRACTURE: Primary | ICD-10-CM

## 2018-09-10 DIAGNOSIS — E55.9 VITAMIN D INSUFFICIENCY: ICD-10-CM

## 2018-09-10 DIAGNOSIS — J45.20 MILD INTERMITTENT ASTHMA WITHOUT COMPLICATION: ICD-10-CM

## 2018-09-10 DIAGNOSIS — Z00.00 HEALTHCARE MAINTENANCE: ICD-10-CM

## 2018-09-10 DIAGNOSIS — E78.00 PURE HYPERCHOLESTEROLEMIA: ICD-10-CM

## 2018-09-10 DIAGNOSIS — R00.2 PALPITATIONS: ICD-10-CM

## 2018-09-10 PROCEDURE — 99213 OFFICE O/P EST LOW 20 MIN: CPT | Mod: PBBFAC,PN | Performed by: INTERNAL MEDICINE

## 2018-09-10 PROCEDURE — 99999 PR PBB SHADOW E&M-EST. PATIENT-LVL III: CPT | Mod: PBBFAC,,, | Performed by: INTERNAL MEDICINE

## 2018-09-10 PROCEDURE — 3044F HG A1C LEVEL LT 7.0%: CPT | Mod: CPTII,,, | Performed by: INTERNAL MEDICINE

## 2018-09-10 PROCEDURE — 1101F PT FALLS ASSESS-DOCD LE1/YR: CPT | Mod: CPTII,,, | Performed by: INTERNAL MEDICINE

## 2018-09-10 PROCEDURE — 99214 OFFICE O/P EST MOD 30 MIN: CPT | Mod: S$PBB,,, | Performed by: INTERNAL MEDICINE

## 2018-09-10 RX ORDER — ALENDRONATE SODIUM 35 MG/1
35 TABLET ORAL
Qty: 15 TABLET | Refills: 1 | Status: SHIPPED | OUTPATIENT
Start: 2018-09-10 | End: 2019-07-24 | Stop reason: SDUPTHER

## 2018-09-10 NOTE — PROGRESS NOTES
Subjective:       Patient ID: Breanna Silva is a 68 y.o. female.    Chief Complaint: Follow-up    HPI  Overall she's been doing well.  Mild intermttent asthma: has been doing well; not using rescue; advair though daily 2 puffs of 230/21 MDI.  DM2: CBG's avr 103-105 in am; watches her carbs. Diert controlled.  HLP: on low fat high fiber diet and metamucil 2x a day mostly. Tolearates rosuvastatin fine.   Hypothyroidism; off supplements for 2-3 yrs now.   Multiple thyroid nodules. 2 on right; one on left; all <1 cm; will repeat thyroid US in 6 mos. R thyroid nodules were not clearly present on 10/10/16 study.  Osteoporosis; 7/24/18 DEXA w Lsp Tscore -0.5 improved from -1.0. Fem bone -2.2, improved from -2.5 on prior DEXA 7/12/17. Weight bearing exercises 5x a week; decrease fosamax from 70 to 35 mg a week.  DEXA in 2 yrs. Labs on f/u.   Palpitations; seeing Dr Pantoja cardiology;  had holter, pending echo; sees him next week 9/24/18 results. Palp are less; eating less chocolate; dizziness has cleared. Suspects vertigo then.   Labs reviewed and ordered on f/u.  total time: 310 pm-400 pm; >50% time spent on discussion, counseling, and review. US thyroid and labs on f/u. .     Review of Systems   Constitutional: Negative for appetite change, fever and unexpected weight change.   HENT: Negative for congestion, postnasal drip, rhinorrhea and sinus pressure.         Seasonal allergies.   Eyes: Negative for discharge and itching.   Respiratory: Negative for cough, chest tightness, shortness of breath and wheezing.    Cardiovascular: Negative for chest pain, palpitations and leg swelling.   Gastrointestinal: Negative for abdominal distention, abdominal pain, blood in stool, constipation, diarrhea, nausea and vomiting.   Endocrine: Negative for polydipsia, polyphagia and polyuria.   Genitourinary: Negative for dysuria and hematuria.   Musculoskeletal: Negative for arthralgias and myalgias.   Skin: Negative for rash.    Allergic/Immunologic: Negative for environmental allergies and food allergies.   Neurological: Negative for tremors, seizures and syncope.   Hematological: Negative for adenopathy. Does not bruise/bleed easily.       Objective:      Vitals:    09/10/18 1508   BP: 130/78   Pulse: 72   Temp: 98.2 °F (36.8 °C)   Weight: 78.7 kg (173 lb 8 oz)   Height: 5' (1.524 m)     Body mass index is 33.88 kg/m².    Physical Exam   Constitutional: She is oriented to person, place, and time. She appears well-developed and well-nourished.   HENT:   Head: Normocephalic and atraumatic.   Eyes: EOM are normal.   Neck: Normal range of motion. Neck supple. No thyromegaly present.   Cardiovascular: Normal rate, regular rhythm and normal heart sounds. Exam reveals no gallop.   No murmur heard.  Pulmonary/Chest: Effort normal and breath sounds normal. No respiratory distress. She has no wheezes. She has no rales.   Abdominal: Soft. Bowel sounds are normal. She exhibits no distension. There is no tenderness. There is no rebound and no guarding.   Musculoskeletal: Normal range of motion. She exhibits no edema.   Lymphadenopathy:     She has no cervical adenopathy.   Neurological: She is alert and oriented to person, place, and time.   Moves all 4 extremities fine.   Skin: No rash noted.   Psychiatric: She has a normal mood and affect. Her behavior is normal. Thought content normal.   Vitals reviewed.      Assessment:       1. Age-related osteoporosis without current pathological fracture    2. Vitamin D insufficiency    3. Multiple thyroid nodules    4. Mild intermittent asthma without complication    5. Type 2 diabetes mellitus without complication, without long-term current use of insulin    6. Pure hypercholesterolemia    7. Palpitations    8. Healthcare maintenance        Plan:       Age-related osteoporosis without current pathological fracture; improved to osteopenia ; reduce to 35 mg a week for fosamax.       Cont her calcium and vit  D.    Vitamin D insufficiency; recent levels therapeutic; levels on f/u. Same dosing.    Multiple thyroid nodules; repeat thyroid US in 11/2018 w f/u and TFT's.  The patient has 2 right thyroid nodules that are small less than 1 cm the night seen on the comparison thyroid ultrasound; also 1 small left thyroid nodule less than 1 cm as well.    Mild intermittent asthma without complication; has been stable on advair MDI; has rescue inhaler if needed.    Type 2 diabetes mellitus without complication, without long-term current use of insulin. Maintain a low carb diet; monitor CBG's at home; keep a log for review.        Diet controlled.Foot exam on f/u. Eye exam 10/1/18; to send us a copy.     Pure hypercholesterolemia. Maintain low fat high fiber diet, exercise regularly. On rosuvastatin; lipid levels on f/u.    Palpitations: being w/u by Dr Pantoja cardiology w holter and echo. F/u pending as well.    Healthcare maint: 12/10/18 Dr Garcia II her gyn for breast exam, pelvic and pap.   7/24/18 mammo w no mammographic evidence of malignancy; repeat in 1 year; regular yearly breast exams w her gyn.

## 2018-09-10 NOTE — PATIENT INSTRUCTIONS
Age-related osteoporosis without current pathological fracture; improved to osteopenia ; reduce to 35 mg a week for fosamax.       Cont her calcium and vit D.    Vitamin D insufficiency; recent levels therapeutic; levels on f/u. Same dosing.    Multiple thyroid nodules; repeat thyroid US in 11/2018 w f/u and TFT's.    Mild intermittent asthma without complication; has been stable on advair MDI; has rescue inhaler if needed.    Type 2 diabetes mellitus without complication, without long-term current use of insulin. Maintain a low carb diet; monitor CBG's at home; keep a log for review.        Diet controlled.Foot exam on f/u. Eye exam 10/1/18; to send us a copy.     Pure hypercholesterolemia. Maintain low fat high fiber diet, exercise regularly. On rosuvastatin; lipid levels on f/u.    Palpitations: being w/u by Dr Pantoja cardiology w holter and echo. F/u pending as well.    Healthcare maint: 12/10/18 Dr Garcia II her gyn for breast exam, pelvic and pap.   7/24/18 mammo w no mammographic evidence of malignancy; repeat in 1 year; regular yearly breast exams w her gyn.

## 2018-10-04 ENCOUNTER — TELEPHONE (OUTPATIENT)
Dept: ADMINISTRATIVE | Facility: HOSPITAL | Age: 68
End: 2018-10-04

## 2018-10-09 ENCOUNTER — TELEPHONE (OUTPATIENT)
Dept: FAMILY MEDICINE | Facility: CLINIC | Age: 68
End: 2018-10-09

## 2018-10-10 NOTE — TELEPHONE ENCOUNTER
Notify patient that her healthcare maintenance diabetic eye exam showed no evidence of diabetic retinopathy.  Please keep her follow-up appointment with her ophthalmologist in 12 months for reassessment

## 2018-11-26 ENCOUNTER — HOSPITAL ENCOUNTER (OUTPATIENT)
Dept: RADIOLOGY | Facility: HOSPITAL | Age: 68
Discharge: HOME | End: 2018-11-26
Attending: INTERNAL MEDICINE
Payer: MEDICARE

## 2018-11-26 DIAGNOSIS — E04.2 MULTIPLE THYROID NODULES: ICD-10-CM

## 2018-11-26 PROCEDURE — 76536 US EXAM OF HEAD AND NECK: CPT | Mod: TC,HCWC,PO

## 2018-11-26 PROCEDURE — 76536 US EXAM OF HEAD AND NECK: CPT | Mod: 26,HCWC,, | Performed by: RADIOLOGY

## 2018-12-31 ENCOUNTER — LAB VISIT (OUTPATIENT)
Dept: LAB | Facility: HOSPITAL | Age: 68
End: 2018-12-31
Attending: INTERNAL MEDICINE
Payer: MEDICARE

## 2018-12-31 DIAGNOSIS — M81.0 AGE-RELATED OSTEOPOROSIS WITHOUT CURRENT PATHOLOGICAL FRACTURE: ICD-10-CM

## 2018-12-31 DIAGNOSIS — E78.00 PURE HYPERCHOLESTEROLEMIA: ICD-10-CM

## 2018-12-31 DIAGNOSIS — J45.20 MILD INTERMITTENT ASTHMA WITHOUT COMPLICATION: ICD-10-CM

## 2018-12-31 DIAGNOSIS — E11.9 TYPE 2 DIABETES MELLITUS WITHOUT COMPLICATION, WITHOUT LONG-TERM CURRENT USE OF INSULIN: ICD-10-CM

## 2018-12-31 DIAGNOSIS — R60.0 LOWER LEG EDEMA: ICD-10-CM

## 2018-12-31 DIAGNOSIS — E66.09 CLASS 1 OBESITY DUE TO EXCESS CALORIES WITH SERIOUS COMORBIDITY AND BODY MASS INDEX (BMI) OF 33.0 TO 33.9 IN ADULT: ICD-10-CM

## 2018-12-31 DIAGNOSIS — E55.9 VITAMIN D INSUFFICIENCY: ICD-10-CM

## 2018-12-31 DIAGNOSIS — E04.2 MULTIPLE THYROID NODULES: ICD-10-CM

## 2018-12-31 LAB
25(OH)D3+25(OH)D2 SERPL-MCNC: 45 NG/ML
ALBUMIN SERPL BCP-MCNC: 3.5 G/DL
ALP SERPL-CCNC: 61 U/L
ALT SERPL W/O P-5'-P-CCNC: 25 U/L
ANION GAP SERPL CALC-SCNC: 7 MMOL/L
AST SERPL-CCNC: 29 U/L
BASOPHILS # BLD AUTO: 0.06 K/UL
BASOPHILS NFR BLD: 1.2 %
BILIRUB SERPL-MCNC: 0.5 MG/DL
BUN SERPL-MCNC: 11 MG/DL
CALCIUM SERPL-MCNC: 9.1 MG/DL
CHLORIDE SERPL-SCNC: 106 MMOL/L
CHOLEST SERPL-MCNC: 176 MG/DL
CHOLEST/HDLC SERPL: 2.4 {RATIO}
CO2 SERPL-SCNC: 28 MMOL/L
CREAT SERPL-MCNC: 0.8 MG/DL
DIFFERENTIAL METHOD: ABNORMAL
EOSINOPHIL # BLD AUTO: 0.2 K/UL
EOSINOPHIL NFR BLD: 3.2 %
ERYTHROCYTE [DISTWIDTH] IN BLOOD BY AUTOMATED COUNT: 12.8 %
EST. GFR  (AFRICAN AMERICAN): >60 ML/MIN/1.73 M^2
EST. GFR  (NON AFRICAN AMERICAN): >60 ML/MIN/1.73 M^2
ESTIMATED AVG GLUCOSE: 134 MG/DL
GLUCOSE SERPL-MCNC: 138 MG/DL
HBA1C MFR BLD HPLC: 6.3 %
HCT VFR BLD AUTO: 42.3 %
HDLC SERPL-MCNC: 74 MG/DL
HDLC SERPL: 42 %
HGB BLD-MCNC: 14 G/DL
IMM GRANULOCYTES # BLD AUTO: 0 K/UL
IMM GRANULOCYTES NFR BLD AUTO: 0 %
LDLC SERPL CALC-MCNC: 84.2 MG/DL
LYMPHOCYTES # BLD AUTO: 2.5 K/UL
LYMPHOCYTES NFR BLD: 49.3 %
MAGNESIUM SERPL-MCNC: 1.8 MG/DL
MCH RBC QN AUTO: 29.7 PG
MCHC RBC AUTO-ENTMCNC: 33.1 G/DL
MCV RBC AUTO: 90 FL
MONOCYTES # BLD AUTO: 0.4 K/UL
MONOCYTES NFR BLD: 8 %
NEUTROPHILS # BLD AUTO: 1.9 K/UL
NEUTROPHILS NFR BLD: 38.3 %
NONHDLC SERPL-MCNC: 102 MG/DL
NRBC BLD-RTO: 0 /100 WBC
PLATELET # BLD AUTO: 218 K/UL
PMV BLD AUTO: 11.3 FL
POTASSIUM SERPL-SCNC: 4.1 MMOL/L
PROT SERPL-MCNC: 7.1 G/DL
RBC # BLD AUTO: 4.72 M/UL
SODIUM SERPL-SCNC: 141 MMOL/L
T3FREE SERPL-MCNC: 2.7 PG/ML
T4 FREE SERPL-MCNC: 0.94 NG/DL
TRIGL SERPL-MCNC: 89 MG/DL
TSH SERPL DL<=0.005 MIU/L-ACNC: 7.07 UIU/ML
WBC # BLD AUTO: 4.97 K/UL

## 2018-12-31 PROCEDURE — 83036 HEMOGLOBIN GLYCOSYLATED A1C: CPT

## 2018-12-31 PROCEDURE — 84443 ASSAY THYROID STIM HORMONE: CPT

## 2018-12-31 PROCEDURE — 82306 VITAMIN D 25 HYDROXY: CPT

## 2018-12-31 PROCEDURE — 80061 LIPID PANEL: CPT

## 2018-12-31 PROCEDURE — 80053 COMPREHEN METABOLIC PANEL: CPT

## 2018-12-31 PROCEDURE — 84481 FREE ASSAY (FT-3): CPT

## 2018-12-31 PROCEDURE — 36415 COLL VENOUS BLD VENIPUNCTURE: CPT | Mod: PO

## 2018-12-31 PROCEDURE — 85025 COMPLETE CBC W/AUTO DIFF WBC: CPT

## 2018-12-31 PROCEDURE — 84439 ASSAY OF FREE THYROXINE: CPT

## 2018-12-31 PROCEDURE — 83735 ASSAY OF MAGNESIUM: CPT

## 2019-01-05 ENCOUNTER — TELEPHONE (OUTPATIENT)
Dept: FAMILY MEDICINE | Facility: CLINIC | Age: 69
End: 2019-01-05

## 2019-01-05 NOTE — TELEPHONE ENCOUNTER
Please notify patient we have her thyroid ultrasound results but still need her to schedule a follow-up appointment still to go over the results.  Single nodules were noted but were less than 1 cm; no nodules meeting criteria for biopsy; please schedule follow-up appointment for discussing this within the next 4-6 weeks

## 2019-01-14 ENCOUNTER — OFFICE VISIT (OUTPATIENT)
Dept: FAMILY MEDICINE | Facility: CLINIC | Age: 69
End: 2019-01-14
Payer: MEDICARE

## 2019-01-14 VITALS
WEIGHT: 174.5 LBS | DIASTOLIC BLOOD PRESSURE: 68 MMHG | TEMPERATURE: 98 F | SYSTOLIC BLOOD PRESSURE: 124 MMHG | HEART RATE: 76 BPM | BODY MASS INDEX: 34.08 KG/M2 | RESPIRATION RATE: 20 BRPM

## 2019-01-14 DIAGNOSIS — E78.00 PURE HYPERCHOLESTEROLEMIA: Primary | ICD-10-CM

## 2019-01-14 DIAGNOSIS — E04.2 MULTIPLE THYROID NODULES: ICD-10-CM

## 2019-01-14 DIAGNOSIS — E55.9 VITAMIN D INSUFFICIENCY: ICD-10-CM

## 2019-01-14 DIAGNOSIS — E03.9 HYPOTHYROIDISM, UNSPECIFIED TYPE: ICD-10-CM

## 2019-01-14 DIAGNOSIS — J45.20 MILD INTERMITTENT ASTHMA WITHOUT COMPLICATION: ICD-10-CM

## 2019-01-14 DIAGNOSIS — E11.9 TYPE 2 DIABETES MELLITUS WITHOUT COMPLICATION, WITHOUT LONG-TERM CURRENT USE OF INSULIN: ICD-10-CM

## 2019-01-14 DIAGNOSIS — R60.0 FLUID RETENTION IN LEGS: ICD-10-CM

## 2019-01-14 LAB — GLUCOSE SERPL-MCNC: 114 MG/DL (ref 70–110)

## 2019-01-14 PROCEDURE — 1101F PR PT FALLS ASSESS DOC 0-1 FALLS W/OUT INJ PAST YR: ICD-10-PCS | Mod: CPTII,S$GLB,, | Performed by: INTERNAL MEDICINE

## 2019-01-14 PROCEDURE — 99214 PR OFFICE/OUTPT VISIT, EST, LEVL IV, 30-39 MIN: ICD-10-PCS | Mod: S$GLB,,, | Performed by: INTERNAL MEDICINE

## 2019-01-14 PROCEDURE — 99214 OFFICE O/P EST MOD 30 MIN: CPT | Mod: S$GLB,,, | Performed by: INTERNAL MEDICINE

## 2019-01-14 PROCEDURE — 3044F HG A1C LEVEL LT 7.0%: CPT | Mod: CPTII,S$GLB,, | Performed by: INTERNAL MEDICINE

## 2019-01-14 PROCEDURE — 99999 PR PBB SHADOW E&M-EST. PATIENT-LVL IV: CPT | Mod: PBBFAC,,, | Performed by: INTERNAL MEDICINE

## 2019-01-14 PROCEDURE — 82962 GLUCOSE BLOOD TEST: CPT | Mod: S$GLB,,, | Performed by: INTERNAL MEDICINE

## 2019-01-14 PROCEDURE — 99999 PR PBB SHADOW E&M-EST. PATIENT-LVL IV: ICD-10-PCS | Mod: PBBFAC,,, | Performed by: INTERNAL MEDICINE

## 2019-01-14 PROCEDURE — 1101F PT FALLS ASSESS-DOCD LE1/YR: CPT | Mod: CPTII,S$GLB,, | Performed by: INTERNAL MEDICINE

## 2019-01-14 PROCEDURE — 82962 POCT GLUCOSE, HAND-HELD DEVICE: ICD-10-PCS | Mod: S$GLB,,, | Performed by: INTERNAL MEDICINE

## 2019-01-14 PROCEDURE — 3044F PR MOST RECENT HEMOGLOBIN A1C LEVEL <7.0%: ICD-10-PCS | Mod: CPTII,S$GLB,, | Performed by: INTERNAL MEDICINE

## 2019-01-14 RX ORDER — TORSEMIDE 20 MG/1
TABLET ORAL
Qty: 30 TABLET | Refills: 1 | Status: SHIPPED | OUTPATIENT
Start: 2019-01-14 | End: 2022-06-25

## 2019-01-14 NOTE — PROGRESS NOTES
Subjective:       Patient ID: Breanna Silva is a 68 y.o. female.    Chief Complaint: Follow-up    HPI  Overall she's been doing fine.  DM2: BS avr ?; [facility] cant't get her glucometer test strips. Glucometer 3-4 yrs old; given script for new glucometer and test strips for BS to obtain from pharmacist to use BID.  Eye exam in Sept yearly. Dr Clemente; to send us a copy. ; HgA1C 6.3.    Asthma; has been doing well; rescue inhaler rarely used; on advair daily.     HLP: on low fat high fiber diet; tolerates rosuvastatin fine.     Pre-HTN: .watches her salt; BP here 124/68.    Hypothyroidism: TSH 7.07; freeT4 0.94; feeT3 2.7; TSH has increased on TFT's; pt deosn't want to start levothyroxine supplements yet as thiks that hair loss in past due to thyroid supplements 4-5 yrs ago was due to thyroid supplements; helped when stopped but cont to fall out; w stopping Xolair injections it finally stopped. No anxiety/no depression; no hair loss now; no brittle nails or fatigue. No myalgias.     Mult thyroid nodules. Single subcentimeter nodules noted bilaterally; none meeting criteria for bx. Repeat in 6-8 mos.     Review of Systems   Constitutional: Negative for appetite change, fever and unexpected weight change.   HENT: Positive for postnasal drip. Negative for congestion, rhinorrhea and sinus pressure.         Seasonal allergies   Eyes: Negative for discharge and itching.   Respiratory: Negative for cough, chest tightness, shortness of breath and wheezing.    Cardiovascular: Negative for chest pain, palpitations and leg swelling.   Gastrointestinal: Negative for abdominal distention, abdominal pain, blood in stool, constipation, diarrhea, nausea and vomiting.   Endocrine: Negative for polydipsia, polyphagia and polyuria.   Genitourinary: Negative for dysuria and hematuria.   Musculoskeletal: Positive for arthralgias. Negative for myalgias.        Chr LBP.   Skin: Negative for rash.   Allergic/Immunologic: Negative  for environmental allergies and food allergies.   Neurological: Negative for tremors, seizures and syncope.   Hematological: Negative for adenopathy. Does not bruise/bleed easily.   Psychiatric/Behavioral:        Denies anxiety or depression.       Objective:      Vitals:    01/14/19 0840   BP: 124/68   Pulse: 76   Resp: 20   Temp: 98.1 °F (36.7 °C)   TempSrc: Oral   Weight: 79.2 kg (174 lb 7.9 oz)     Body mass index is 34.08 kg/m².    Physical Exam   Constitutional: She is oriented to person, place, and time. She appears well-developed and well-nourished.   HENT:   Head: Normocephalic and atraumatic.   Eyes: EOM are normal.   Neck: Normal range of motion. Neck supple. No thyromegaly present.   Cardiovascular: Normal rate, regular rhythm and normal heart sounds. Exam reveals no gallop.   No murmur heard.  Pulmonary/Chest: Effort normal and breath sounds normal. No respiratory distress. She has no wheezes. She has no rales.   Abdominal: Soft. Bowel sounds are normal. She exhibits no distension. There is no tenderness. There is no rebound and no guarding.   Musculoskeletal: Normal range of motion. She exhibits no edema.   Lymphadenopathy:     She has no cervical adenopathy.   Neurological: She is alert and oriented to person, place, and time.   Moves all 4 extremities fine.   Skin: No rash noted.   Psychiatric: She has a normal mood and affect. Her behavior is normal. Thought content normal.   Vitals reviewed.      Assessment:       1. Pure hypercholesterolemia    2. Hypothyroidism, unspecified type    3. Multiple thyroid nodules    4. Type 2 diabetes mellitus without complication, without long-term current use of insulin    5. Mild intermittent asthma without complication    6. Vitamin D insufficiency    7. Fluid retention in legs        Plan:       Pure hypercholesterolemia. Maintain low fat high fiber diet, exercise regularly. Cont rosuvastatin.  -     T3, free; Future; Expected date: 01/14/2019  -     T4, free;  Future; Expected date: 01/14/2019  -     TSH; Future; Expected date: 01/14/2019  -     Lipid panel; Future; Expected date: 01/14/2019    Hypothyroidism, unspecified type; doesn't desire thyroid supplements yet to be resumed.  -     US Soft Tissue Head Neck Thyroid; Future; Expected date: 01/14/2019  -     T3, free; Future; Expected date: 01/14/2019  -     T4, free; Future; Expected date: 01/14/2019  -     TSH; Future; Expected date: 01/14/2019  -     THYROID PEROXIDASE ANTIBODY; Future; Expected date: 01/14/2019  -     THYROID STIMULATING IMMUNOGLOBULIN; Future; Expected date: 01/14/2019    Multiple thyroid nodules; repeat thyroid US in 6 mos.  -     US Soft Tissue Head Neck Thyroid; Future; Expected date: 01/14/2019  -     T3, free; Future; Expected date: 01/14/2019  -     T4, free; Future; Expected date: 01/14/2019  -     TSH; Future; Expected date: 01/14/2019  -     THYROID PEROXIDASE ANTIBODY; Future; Expected date: 01/14/2019  -     THYROID STIMULATING IMMUNOGLOBULIN; Future; Expected date: 01/14/2019    Type 2 diabetes mellitus without complication, without long-term current use of insulin; given script for   new glucometer and test strips for BS's to obtain form pharmacy. Maintain a low carb diet; monitor CBG's at home; keep a log for review.  -     Comprehensive metabolic panel; Future; Expected date: 01/14/2019  -     Hemoglobin A1c; Future; Expected date: 01/14/2019  -     Microalbumin/creatinine urine ratio; Future; Expected date: 07/14/2019  -     Urinalysis; Future; Expected date: 01/14/2019  -     POCT Glucose, Hand-Held Device    Mild intermittent asthma without complication; rescue as needed; advair daily.    Vitamin D insufficiency; cont vit D supplements.    Fluid retention in legs; uses demadex only prn basis; Maintain less than 2 g sodium diet; elevate lower extremities at home; use compression stockings if possible.  -     torsemide (DEMADEX) 20 MG Tab; 20 mg po daily as needed for fluid   Dispense: 30 tablet; Refill: 1  -     Comprehensive metabolic panel; Future; Expected date: 01/14/2019  -     Magnesium; Future; Expected date: 01/14/2019

## 2019-01-14 NOTE — PATIENT INSTRUCTIONS
Pure hypercholesterolemia. Maintain low fat high fiber diet, exercise regularly. Cont rosuvastatin.  -     T3, free; Future; Expected date: 01/14/2019  -     T4, free; Future; Expected date: 01/14/2019  -     TSH; Future; Expected date: 01/14/2019  -     Lipid panel; Future; Expected date: 01/14/2019    Hypothyroidism, unspecified type; doesn't desire thyroid supplements yet to be resumed.  -     US Soft Tissue Head Neck Thyroid; Future; Expected date: 01/14/2019  -     T3, free; Future; Expected date: 01/14/2019  -     T4, free; Future; Expected date: 01/14/2019  -     TSH; Future; Expected date: 01/14/2019  -     THYROID PEROXIDASE ANTIBODY; Future; Expected date: 01/14/2019  -     THYROID STIMULATING IMMUNOGLOBULIN; Future; Expected date: 01/14/2019    Multiple thyroid nodules; repeat thyroid US in 6 mos.  -     US Soft Tissue Head Neck Thyroid; Future; Expected date: 01/14/2019  -     T3, free; Future; Expected date: 01/14/2019  -     T4, free; Future; Expected date: 01/14/2019  -     TSH; Future; Expected date: 01/14/2019  -     THYROID PEROXIDASE ANTIBODY; Future; Expected date: 01/14/2019  -     THYROID STIMULATING IMMUNOGLOBULIN; Future; Expected date: 01/14/2019    Type 2 diabetes mellitus without complication, without long-term current use of insulin; given script for   new glucometer and test strips for BS's to obtain form pharmacy. Maintain a low carb diet; monitor CBG's at home; keep a log for review.  -     Comprehensive metabolic panel; Future; Expected date: 01/14/2019  -     Hemoglobin A1c; Future; Expected date: 01/14/2019  -     Microalbumin/creatinine urine ratio; Future; Expected date: 07/14/2019  -     Urinalysis; Future; Expected date: 01/14/2019  -     POCT Glucose, Hand-Held Device    Mild intermittent asthma without complication; rescue as needed; advair daily.    Vitamin D insufficiency; cont vit D supplements.    Fluid retention in legs; uses demadex only prn basis; Maintain less than 2 g  sodium diet; elevate lower extremities at home; use compression stockings if possible.  -     torsemide (DEMADEX) 20 MG Tab; 20 mg po daily as needed for fluid  Dispense: 30 tablet; Refill: 1  -     Comprehensive metabolic panel; Future; Expected date: 01/14/2019  -     Magnesium; Future; Expected date: 01/14/2019

## 2019-04-15 ENCOUNTER — OFFICE VISIT (OUTPATIENT)
Dept: FAMILY MEDICINE | Facility: CLINIC | Age: 69
End: 2019-04-15
Payer: MEDICARE

## 2019-04-15 VITALS
WEIGHT: 168.63 LBS | HEART RATE: 81 BPM | TEMPERATURE: 98 F | BODY MASS INDEX: 33.11 KG/M2 | HEIGHT: 60 IN | SYSTOLIC BLOOD PRESSURE: 118 MMHG | DIASTOLIC BLOOD PRESSURE: 60 MMHG

## 2019-04-15 DIAGNOSIS — E04.2 MULTIPLE THYROID NODULES: ICD-10-CM

## 2019-04-15 DIAGNOSIS — E78.00 PURE HYPERCHOLESTEROLEMIA: ICD-10-CM

## 2019-04-15 DIAGNOSIS — Z00.00 ANNUAL PHYSICAL EXAM: Primary | ICD-10-CM

## 2019-04-15 DIAGNOSIS — E11.9 ENCOUNTER FOR DIABETIC FOOT EXAM: ICD-10-CM

## 2019-04-15 DIAGNOSIS — M85.859 OSTEOPENIA OF NECK OF FEMUR, UNSPECIFIED LATERALITY: ICD-10-CM

## 2019-04-15 DIAGNOSIS — J45.20 MILD INTERMITTENT ASTHMA WITHOUT COMPLICATION: ICD-10-CM

## 2019-04-15 DIAGNOSIS — Z23 NEED FOR VACCINATION WITH 13-POLYVALENT PNEUMOCOCCAL CONJUGATE VACCINE: ICD-10-CM

## 2019-04-15 DIAGNOSIS — E03.9 HYPOTHYROIDISM, UNSPECIFIED TYPE: ICD-10-CM

## 2019-04-15 DIAGNOSIS — R03.0 PRE-HYPERTENSION: ICD-10-CM

## 2019-04-15 DIAGNOSIS — E11.9 TYPE 2 DIABETES MELLITUS WITHOUT COMPLICATION, WITHOUT LONG-TERM CURRENT USE OF INSULIN: ICD-10-CM

## 2019-04-15 DIAGNOSIS — E66.09 CLASS 1 OBESITY DUE TO EXCESS CALORIES WITH SERIOUS COMORBIDITY AND BODY MASS INDEX (BMI) OF 32.0 TO 32.9 IN ADULT: ICD-10-CM

## 2019-04-15 PROCEDURE — 3044F PR MOST RECENT HEMOGLOBIN A1C LEVEL <7.0%: ICD-10-PCS | Mod: HCNC,CPTII,S$GLB, | Performed by: INTERNAL MEDICINE

## 2019-04-15 PROCEDURE — 99999 PR PBB SHADOW E&M-EST. PATIENT-LVL IV: ICD-10-PCS | Mod: PBBFAC,HCNC,, | Performed by: INTERNAL MEDICINE

## 2019-04-15 PROCEDURE — 99999 PR PBB SHADOW E&M-EST. PATIENT-LVL IV: CPT | Mod: PBBFAC,HCNC,, | Performed by: INTERNAL MEDICINE

## 2019-04-15 PROCEDURE — 3044F HG A1C LEVEL LT 7.0%: CPT | Mod: HCNC,CPTII,S$GLB, | Performed by: INTERNAL MEDICINE

## 2019-04-15 PROCEDURE — 90670 PCV13 VACCINE IM: CPT | Mod: HCNC,S$GLB,, | Performed by: INTERNAL MEDICINE

## 2019-04-15 PROCEDURE — 90670 PNEUMOCOCCAL CONJUGATE VACCINE 13-VALENT LESS THAN 5YO & GREATER THAN: ICD-10-PCS | Mod: HCNC,S$GLB,, | Performed by: INTERNAL MEDICINE

## 2019-04-15 PROCEDURE — 99397 PER PM REEVAL EST PAT 65+ YR: CPT | Mod: HCNC,S$GLB,, | Performed by: INTERNAL MEDICINE

## 2019-04-15 PROCEDURE — G0009 PNEUMOCOCCAL CONJUGATE VACCINE 13-VALENT LESS THAN 5YO & GREATER THAN: ICD-10-PCS | Mod: HCNC,S$GLB,, | Performed by: INTERNAL MEDICINE

## 2019-04-15 PROCEDURE — 99397 PR PREVENTIVE VISIT,EST,65 & OVER: ICD-10-PCS | Mod: HCNC,S$GLB,, | Performed by: INTERNAL MEDICINE

## 2019-04-15 PROCEDURE — G0009 ADMIN PNEUMOCOCCAL VACCINE: HCPCS | Mod: HCNC,S$GLB,, | Performed by: INTERNAL MEDICINE

## 2019-04-15 RX ORDER — FLUTICASONE PROPIONATE AND SALMETEROL XINAFOATE 230; 21 UG/1; UG/1
2 AEROSOL, METERED RESPIRATORY (INHALATION) 2 TIMES DAILY
Qty: 12 G | Refills: 2 | Status: SHIPPED | OUTPATIENT
Start: 2019-04-15 | End: 2022-03-18 | Stop reason: SDUPTHER

## 2019-04-15 RX ORDER — DIPHENHYDRAMINE HCL 25 MG
TABLET ORAL
COMMUNITY
Start: 2019-01-15 | End: 2020-01-07

## 2019-04-15 NOTE — PROGRESS NOTES
Subjective:       Patient ID: Breanna Silva is a 68 y.o. female.    Chief Complaint: Medicare AWV    HPI  Patient here today for annual physical.  Past medical history and surgical history is reviewed and updated.  Social medical history and family medical history also reviewed and updated.  Review of systems obtained at length prior to physical exam being performed.    Pre-Hypertension:  Watches her salt intake.  Blood pressure manually is 118/60 here in the office.  Blood pressure at home has been averaging about the same as here.    Pure hypercholesterolemia:  On low-fat high-fiber diet.  Tolerates rosuvastatin fine.    Diabetes mellitus type 2; blood sugars at home have been averaging 110; last eye exam 10/2018; gets in Oct. Foot exam today.     Hypothyroidism:  Not requiring levothyroxine supplementation.    Multiple thyroid nodules:  Last thyroid ultrasound 11/26/2018-single subcentimeter nodules noted bilaterally. No nodules meeting criteria for fine needle aspiration per Radiology.  Will repeat in 6 months to 8 months.    Mild intermittent asthma without complication:  On maintenance Advair inhaler 230/21 w 2 puffs twice a day. Uses rescue.     Osteopenia with vitamin-D insufficiency: She needs to be on weight-bearing exercises 5 times a week with an average of 30 min each; on calcium with vitamin-D supplementation  Last DEXA scan 07/24/2018 with lumbar spine T-score -0.5 and femoral bone T-score -2.2; femoral bone T-score improved from -2.5 on prior DEXA 07/12/2017 of note.  Repeat DEXA due to years for follow-up.  Also on alendronate 35 mg weekly.    GYN is Dr ELHAM Castillo; he does her breast exams; pelvics, and paps; due in 12/2019. Mammogram yearly, due in 7/2019.   TC: last 8/8/17 by Dr Butler; 3-4 polyps removed; repeat in 5 yrs. DUE 8/8/2012.  PPSV-23 at age 67; Prevnar-13 to be given today.       Review of Systems   Constitutional: Negative for appetite change, fever and unexpected weight change.    HENT: Negative for congestion, postnasal drip, rhinorrhea and sinus pressure.         Seasonal allergies w perennial allergies.    Eyes: Negative for discharge and itching.   Respiratory: Negative for cough, chest tightness, shortness of breath and wheezing.    Cardiovascular: Positive for leg swelling. Negative for chest pain and palpitations.        Sees Dr Head Waters cardiology for evaluation filiberto insufficiency; legs w LE edema by thursdays. .    Gastrointestinal: Negative for abdominal distention, abdominal pain, blood in stool, constipation, diarrhea, nausea and vomiting.   Endocrine: Negative for polydipsia, polyphagia and polyuria.   Genitourinary: Negative for dysuria and hematuria.   Musculoskeletal: Negative for arthralgias and myalgias.   Skin: Negative for rash.   Allergic/Immunologic: Negative for environmental allergies and food allergies.   Neurological: Negative for tremors, seizures and syncope.   Hematological: Negative for adenopathy. Does not bruise/bleed easily.   Psychiatric/Behavioral:        Denies anxiety or depression.        Objective:      Vitals:    04/15/19 1042   BP: 118/60   Pulse: 81   Temp: 98 °F (36.7 °C)   TempSrc: Oral   Weight: 76.5 kg (168 lb 10.4 oz)   Height: 5' (1.524 m)     Body mass index is 32.94 kg/m².    Physical Exam   Constitutional: She is oriented to person, place, and time. She appears well-developed and well-nourished.   HENT:   Head: Normocephalic and atraumatic.   Throat pink and moist.    Eyes: EOM are normal.   Neck: Normal range of motion. Neck supple. No thyromegaly present.   Cardiovascular: Normal rate, regular rhythm and normal heart sounds. Exam reveals no gallop.   No murmur heard.  Pulses:       Dorsalis pedis pulses are 2+ on the right side, and 2+ on the left side.   Pulmonary/Chest: Effort normal and breath sounds normal. No respiratory distress. She has no wheezes. She has no rales.   Abdominal: Soft. Bowel sounds are normal. She exhibits no  distension. There is no tenderness. There is no rebound and no guarding.   Musculoskeletal: Normal range of motion. She exhibits no edema.        Right foot: There is normal range of motion and no deformity.        Left foot: There is normal range of motion and no deformity.   Ankles and feet w spider veins noted.    Feet:   Right Foot:   Protective Sensation: 10 sites tested. 10 sites sensed.   Skin Integrity: Positive for callus. Negative for ulcer, skin breakdown or erythema.   Left Foot:   Protective Sensation: 10 sites tested. 10 sites sensed.   Skin Integrity: Positive for callus. Negative for ulcer, skin breakdown or erythema.   Lymphadenopathy:     She has no cervical adenopathy.   Neurological: She is alert and oriented to person, place, and time.   Moves all 4 extremities fine.   Skin: No rash noted.   Bilateral mild callus first toes medially.    Psychiatric: She has a normal mood and affect. Her behavior is normal. Thought content normal.   Vitals reviewed.      Assessment:       1. Annual physical exam    2. Pure hypercholesterolemia    3. Pre-hypertension    4. Type 2 diabetes mellitus without complication, without long-term current use of insulin    5. Encounter for diabetic foot exam    6. Hypothyroidism, unspecified type    7. Multiple thyroid nodules    8. Class 1 obesity due to excess calories with serious comorbidity and body mass index (BMI) of 32.0 to 32.9 in adult    9. Osteopenia of neck of femur, unspecified laterality    10. Need for vaccination with 13-polyvalent pneumococcal conjugate vaccine    11. Mild intermittent asthma without complication        Plan:       Annual physical exam; regular exercise; maintain ideal body wweight as goal; healthy lifestyle choices; limit her caffeine and alcohol; regular exercise include restriction for weight reduction as patient is in obesity category with BMI 32.94; keep her f/u's w her providers as directed and recommended workups as well.   GYN is   ELHAM Rick; does her breast exams; pelvics, and paps; due in 12/2019. Mammogram yearly, due in 7/2019.   TC: last 8/8/17 by Dr Butler; 3-4 polyps removed; repeat in 5 yrs.Due 8/8/2022.  PPSV-23 at age 67; Prevnar-13 to be given today.     Pure hypercholesterolemia. Maintain low fat high fiber diet, exercise regularly. Weight reduction. Cont her crestor.     Pre-hypertension.Maintain < 2 Gm Na a day diet, and monitor BP ; keep a log.    Type 2 diabetes mellitus without complication, without long-term current use of insulin.Maintain a low carb diet; monitor CBG's at home; keep a log for review. Yearly eye exams; due in October. Yearly feet exams as well. Monitor HgA1C's as well. Diet controlled.     Encounter for diabetic foot exam performed today.    Hypothyroidism, unspecified type; follow TFT's periodically; not on levothyroxine.     Multiple thyroid nodules; US thyroid in 7/8/19 w f/u w me 7/15/19. Nodules do not meet criteria as per radiology for FNA.     Class 1 obesity due to excess calories with serious comorbidity and body mass index (BMI) of 32.0 to 32.9 in adult.Caloric restriction w regular exercise and weight reduction.    Osteopenia of neck of femur, unspecified laterality; Weight bearing exercises, continue calcium and vit D supplements. DEXA scan at 2 yr intervals as needed. DEXA due 7/24/2020; recent 25 hydroxy vitamin-D level therapeutic at 45.  Roughly 3 months ago.    Need for vaccination with 13-polyvalent pneumococcal conjugate vaccine  -     (In Office Administered) Pneumococcal Conjugate Vaccine (13 Valent) (IM)

## 2019-04-15 NOTE — PATIENT INSTRUCTIONS
Annual physical exam; regular exercise; maintain ideal body wweight as goal; healthy lifestyle choices; limit her caffeine and alcohol; keep her f/u's w her providers as directed and recommended workups as well. GYN is Dr ELHAM Rick; does her breast exams; pelvics, and paps; due in 12/2019. Mammogram yearly, due in 7/2019.   TC: last 8/8/17 by Dr Butler; 3-4 polyps removed; repeat in 5 yrs.Due 8/8/2022.  PPSV-23 at age 67; Prevnar-13 to be given today.     Pure hypercholesterolemia .Maintain low fat high fiber diet, exercise regularly. Weight reductionCont her crestor.     Pre-hypertension.Maintain < 2 Gm Na a day diet, and monitor BP ; keep a log.    Type 2 diabetes mellitus without complication, without long-term current use of insulin.Maintain a low carb diet; monitor CBG's at home; keep a log for review. Yearly eye exams; due in October. Yearly feet exams as well. Monitor HgA1C's as well. Diet controlled.     Encounter for diabetic foot exam    Hypothyroidism, unspecified type; follow TFT's periodically; not on levothyroxine.     Multiple thyroid nodules; US thyroid in 7/8/19 w f/u w me 7/15/19. Nodules do not meet criteria as per radiology for FNA.     Class 1 obesity due to excess calories with serious comorbidity and body mass index (BMI) of 32.0 to 32.9 in adult. Caloric restriction w regular exercise and weight reduction.    Osteopenia of neck of femur, unspecified laterality; Weight bearing exercises, continue calcium and vit D supplements. DEXA scan at 2 yr intervals as needed. DEXA due 7/24/2020    Need for vaccination with 13-polyvalent pneumococcal conjugate vaccine  -     (In Office Administered) Pneumococcal Conjugate Vaccine (13 Valent) (IM)

## 2019-07-08 ENCOUNTER — HOSPITAL ENCOUNTER (OUTPATIENT)
Dept: RADIOLOGY | Facility: HOSPITAL | Age: 69
Discharge: HOME | End: 2019-07-08
Attending: INTERNAL MEDICINE
Payer: MEDICARE

## 2019-07-08 DIAGNOSIS — E03.9 HYPOTHYROIDISM, UNSPECIFIED TYPE: ICD-10-CM

## 2019-07-08 DIAGNOSIS — E04.2 MULTIPLE THYROID NODULES: ICD-10-CM

## 2019-07-08 PROCEDURE — 76536 US SOFT TISSUE HEAD NECK THYROID: ICD-10-PCS | Mod: 26,HCNC,, | Performed by: RADIOLOGY

## 2019-07-08 PROCEDURE — 76536 US EXAM OF HEAD AND NECK: CPT | Mod: TC,HCNC,PO

## 2019-07-08 PROCEDURE — 76536 US EXAM OF HEAD AND NECK: CPT | Mod: 26,HCNC,, | Performed by: RADIOLOGY

## 2019-07-14 ENCOUNTER — TELEPHONE (OUTPATIENT)
Dept: FAMILY MEDICINE | Facility: CLINIC | Age: 69
End: 2019-07-14

## 2019-07-15 ENCOUNTER — OFFICE VISIT (OUTPATIENT)
Dept: FAMILY MEDICINE | Facility: CLINIC | Age: 69
End: 2019-07-15
Payer: MEDICARE

## 2019-07-15 VITALS
OXYGEN SATURATION: 94 % | HEART RATE: 85 BPM | TEMPERATURE: 98 F | SYSTOLIC BLOOD PRESSURE: 120 MMHG | HEIGHT: 60 IN | DIASTOLIC BLOOD PRESSURE: 70 MMHG | BODY MASS INDEX: 33.63 KG/M2 | WEIGHT: 171.31 LBS

## 2019-07-15 DIAGNOSIS — J45.20 MILD INTERMITTENT ASTHMA WITHOUT COMPLICATION: ICD-10-CM

## 2019-07-15 DIAGNOSIS — E78.49 OTHER HYPERLIPIDEMIA: ICD-10-CM

## 2019-07-15 DIAGNOSIS — E11.9 TYPE 2 DIABETES MELLITUS WITHOUT COMPLICATION, WITHOUT LONG-TERM CURRENT USE OF INSULIN: Primary | ICD-10-CM

## 2019-07-15 DIAGNOSIS — R60.0 BILATERAL LOWER EXTREMITY EDEMA: ICD-10-CM

## 2019-07-15 DIAGNOSIS — E66.09 CLASS 1 OBESITY DUE TO EXCESS CALORIES WITH SERIOUS COMORBIDITY AND BODY MASS INDEX (BMI) OF 33.0 TO 33.9 IN ADULT: ICD-10-CM

## 2019-07-15 DIAGNOSIS — E03.9 HYPOTHYROIDISM, UNSPECIFIED TYPE: ICD-10-CM

## 2019-07-15 DIAGNOSIS — E55.9 VITAMIN D INSUFFICIENCY: ICD-10-CM

## 2019-07-15 DIAGNOSIS — M85.852 OSTEOPENIA OF NECK OF LEFT FEMUR: ICD-10-CM

## 2019-07-15 DIAGNOSIS — E04.2 MULTIPLE THYROID NODULES: ICD-10-CM

## 2019-07-15 PROCEDURE — 99214 PR OFFICE/OUTPT VISIT, EST, LEVL IV, 30-39 MIN: ICD-10-PCS | Mod: HCNC,S$GLB,, | Performed by: INTERNAL MEDICINE

## 2019-07-15 PROCEDURE — 99214 OFFICE O/P EST MOD 30 MIN: CPT | Mod: HCNC,S$GLB,, | Performed by: INTERNAL MEDICINE

## 2019-07-15 PROCEDURE — 1101F PT FALLS ASSESS-DOCD LE1/YR: CPT | Mod: HCNC,CPTII,S$GLB, | Performed by: INTERNAL MEDICINE

## 2019-07-15 PROCEDURE — 99999 PR PBB SHADOW E&M-EST. PATIENT-LVL III: ICD-10-PCS | Mod: PBBFAC,HCNC,, | Performed by: INTERNAL MEDICINE

## 2019-07-15 PROCEDURE — 1101F PR PT FALLS ASSESS DOC 0-1 FALLS W/OUT INJ PAST YR: ICD-10-PCS | Mod: HCNC,CPTII,S$GLB, | Performed by: INTERNAL MEDICINE

## 2019-07-15 PROCEDURE — 3044F PR MOST RECENT HEMOGLOBIN A1C LEVEL <7.0%: ICD-10-PCS | Mod: HCNC,CPTII,S$GLB, | Performed by: INTERNAL MEDICINE

## 2019-07-15 PROCEDURE — 99999 PR PBB SHADOW E&M-EST. PATIENT-LVL III: CPT | Mod: PBBFAC,HCNC,, | Performed by: INTERNAL MEDICINE

## 2019-07-15 PROCEDURE — 3044F HG A1C LEVEL LT 7.0%: CPT | Mod: HCNC,CPTII,S$GLB, | Performed by: INTERNAL MEDICINE

## 2019-07-15 NOTE — PATIENT INSTRUCTIONS
Type 2 diabetes mellitus without complication, without long-term current use of insulin.Maintain a low carb diet; monitor CBG's at home; keep a log for review.  Diet controlled.   -     TSH; Future; Expected date: 07/15/2019  -     T4, free; Future; Expected date: 07/15/2019  -     T3, free; Future; Expected date: 07/15/2019  -     Lipid panel; Future; Expected date: 07/15/2019  -     Comprehensive metabolic panel; Future; Expected date: 07/15/2019  -     Hemoglobin A1c; Future; Expected date: 07/15/2019  -     Urinalysis; Future; Expected date: 07/15/2019  -     Urinalysis Microscopic; Future; Expected date: 01/15/2020    Hypothyroidism, unspecified type; wants to remain off supplementation for now due to hair texture change.  -     TSH; Future; Expected date: 07/15/2019  -     T4, free; Future; Expected date: 07/15/2019  -     T3, free; Future; Expected date: 07/15/2019    Other hyperlipidemia.Maintain low fat high fiber diet, exercise regularly. Try to remember to take 7x a week. Weight reduction  -     Lipid panel; Future; Expected date: 07/15/2019  -     Comprehensive metabolic panel; Future; Expected date: 07/15/2019    Mild intermittent asthma without complication; use rescue inhaler as needed; Advair HFA as maintenance 2 puffs BID.   -     CBC auto differential; Future; Expected date: 07/15/2019    Osteopenia of neck of left femur; Weight bearing exercises, continue calcium and vit D supplements. DEXA scabn at 2 yr intervals.    Vitamin D insufficiency; same supplements; vit D level on f/u.     Class 1 obesity due to excess calories with serious comorbidity and body mass index (BMI) of 33.0 to 33.9 in adult.Caloric restriction w regular exercise and weight reduction.  -     TSH; Future; Expected date: 07/15/2019  -     T4, free; Future; Expected date: 07/15/2019  -     T3, free; Future; Expected date: 07/15/2019  -     Comprehensive metabolic panel; Future; Expected date: 07/15/2019  -     Hemoglobin A1c;  Future; Expected date: 07/15/2019    Bilateral lower extremity edema; Maintain less than 2 g sodium diet; elevate lower extremities at home; use compression stockings if possible.  -     Comprehensive metabolic panel; Future; Expected date: 07/15/2019    Thyroid nodules: less then 10 mm in size; stable; repeat US thyroid in 9 mos.

## 2019-07-15 NOTE — PROGRESS NOTES
Subjective:       Patient ID: Breanna Silva is a 68 y.o. female.    Chief Complaint: Results (lab results and ultrasound report )    HPI  patient here today for reassessment to go over her labs and ultrasound report.    Diabetes mellitus type 2:  Watches a carbohydrates; blood sugars at home have been averaging- around 112; she is diet controlled her diabetes. ; HgA1C 6.5.  Hypothyroidism:  Followed conservatively; patient is not on any levothyroxine supplementation at present. Off for about 3 yrs now. .Stopped levothyroxine due to suspected hair loss; improved afterwards; texture as well was different. TSH improved now to 4.35; 1 yr after stopping it before she noticed a difference; on xolair during that time; after stopping xolair, for 1 yr then started noticing the difference. Wants to wait still on any thyroid supplementation. Levothyroxine she suspects affected texture; which helped alopecia from Xolair.   Hyperlipidemia:  On low-fat high-fiber diet; goal is exercise 5 times a week minimum 25-30 minutes along with caloric restriction to help her weight come down.  Tolerates rosuvastatin at 10 mg each evening. Skipping 2x a week; will try to take her rosuvastatin more regularly.   Mild intermittent asthma without complication:  Has albuterol as a rescue inhaler; maintenance therapy with Advair HFA inhaler 230/21,  2 puffs twice a day. Rescue not needed often at all.  Osteopenia with vitamin-D insufficiency:  Weight-bearing exercise and continuing her calcium with vitamin-D supplementation; she is also on alendronate 35 mg weekly.  DEXA scan 07/24/2018 with comparison to 07/12/2017; lumbar spine T-score -0.5 previously was-1.0.  Hip replacement in right hip. Femoral bone T-score -2.2 previously it has improved from -2.5..  DEXA scan needed in 2 years from 07/24/2018.  Thyroid nodules; US thyroid w no detrimental change; 2 nodules noted less then 1 cm in size. Heterogeneous thyroid gland.     Obesity:   BMI 33.45; goal is exercise 5 times a week minimum 25-30 minutes along with caloric restriction to help her weight come down.  [place] LE edema; min.; saw Dr Pantoja card, and to to start compression stockings for v. insuffiency.   Shingles vaccine discussed; wants to put off for 6 mos for now.   Total time: 922-1030 am; >50% time spent on discussion, counseling, and review;     Review of Systems   Constitutional: Negative for appetite change, fever and unexpected weight change.   HENT: Negative for congestion, postnasal drip, rhinorrhea and sinus pressure.    Eyes: Negative for discharge and itching.   Respiratory: Negative for cough, chest tightness, shortness of breath and wheezing.    Cardiovascular: Positive for leg swelling. Negative for chest pain and palpitations.        Saw cardiologist dr Pantoja; denisse orta and told to wear compression stockings.    Gastrointestinal: Negative for abdominal distention, abdominal pain, blood in stool, constipation, diarrhea, nausea and vomiting.   Endocrine: Negative for polydipsia, polyphagia and polyuria.   Genitourinary: Negative for dysuria and hematuria.   Musculoskeletal: Negative for arthralgias and myalgias.   Skin: Negative for rash.   Allergic/Immunologic: Negative for environmental allergies and food allergies.   Neurological: Negative for tremors, seizures and syncope.   Hematological: Negative for adenopathy. Does not bruise/bleed easily.   Psychiatric/Behavioral:        Denies anxiety or depression       Objective:      Vitals:    07/15/19 0909   BP: 120/70   Pulse: 85   Temp: 98.3 °F (36.8 °C)   TempSrc: Oral   SpO2: (!) 94%   Weight: 77.7 kg (171 lb 4.8 oz)   Height: 5' (1.524 m)     Body mass index is 33.45 kg/m².    Physical Exam   Constitutional: She is oriented to person, place, and time. She appears well-developed and well-nourished.   HENT:   Head: Normocephalic and atraumatic.   Eyes: EOM are normal.   Neck: Normal range of motion. Neck supple. No thyromegaly  present.   No carotid bruits heard.    Cardiovascular: Normal rate, regular rhythm and normal heart sounds. Exam reveals no gallop.   No murmur heard.  Pulmonary/Chest: Effort normal and breath sounds normal. No respiratory distress. She has no wheezes. She has no rales.   Abdominal: Soft. Bowel sounds are normal. She exhibits no distension. There is no tenderness. There is no rebound and no guarding.   Musculoskeletal: Normal range of motion. She exhibits no edema.   Lymphadenopathy:     She has no cervical adenopathy.   Neurological: She is alert and oriented to person, place, and time.   Moves all 4 extremities fine.   Skin: No rash noted.   Psychiatric: She has a normal mood and affect. Her behavior is normal. Thought content normal.   Vitals reviewed.      Assessment:       1. Type 2 diabetes mellitus without complication, without long-term current use of insulin    2. Hypothyroidism, unspecified type    3. Other hyperlipidemia    4. Mild intermittent asthma without complication    5. Osteopenia of neck of left femur    6. Vitamin D insufficiency    7. Class 1 obesity due to excess calories with serious comorbidity and body mass index (BMI) of 33.0 to 33.9 in adult    8. Bilateral lower extremity edema    9. Multiple thyroid nodules        Plan:       Type 2 diabetes mellitus without complication, without long-term current use of insulin.Maintain a low carb diet; monitor CBG's at home; keep a log for review.  Diet controlled.   -     TSH; Future; Expected date: 07/15/2019  -     T4, free; Future; Expected date: 07/15/2019  -     T3, free; Future; Expected date: 07/15/2019  -     Lipid panel; Future; Expected date: 07/15/2019  -     Comprehensive metabolic panel; Future; Expected date: 07/15/2019  -     Hemoglobin A1c; Future; Expected date: 07/15/2019  -     Urinalysis; Future; Expected date: 07/15/2019  -     Urinalysis Microscopic; Future; Expected date: 01/15/2020    Hypothyroidism, unspecified type; wants  to remain off supplementation for now due to hair texture change.  -     TSH; Future; Expected date: 07/15/2019  -     T4, free; Future; Expected date: 07/15/2019  -     T3, free; Future; Expected date: 07/15/2019    Other hyperlipidemia.Maintain low fat high fiber diet, exercise regularly. Try to remember to take 7x a week. Weight reduction  -     Lipid panel; Future; Expected date: 07/15/2019  -     Comprehensive metabolic panel; Future; Expected date: 07/15/2019    Mild intermittent asthma without complication; use rescue inhaler as needed; Advair HFA as maintenance 2 puffs BID.   -     CBC auto differential; Future; Expected date: 07/15/2019    Osteopenia of neck of left femur; Weight bearing exercises, continue calcium and vit D supplements. DEXA scabn at 2 yr intervals.    Vitamin D insufficiency; same supplements; vit D level on f/u.     Class 1 obesity due to excess calories with serious comorbidity and body mass index (BMI) of 33.0 to 33.9 in adult.Caloric restriction w regular exercise and weight reduction.  -     TSH; Future; Expected date: 07/15/2019  -     T4, free; Future; Expected date: 07/15/2019  -     T3, free; Future; Expected date: 07/15/2019  -     Comprehensive metabolic panel; Future; Expected date: 07/15/2019  -     Hemoglobin A1c; Future; Expected date: 07/15/2019    Bilateral lower extremity edema; Maintain less than 2 g sodium diet; elevate lower extremities at home; use compression stockings if possible.  -     Comprehensive metabolic panel; Future; Expected date: 07/15/2019    Thyroid nodules: less then 10 mm in size; stable; repeat US thyroid in 9 mos.

## 2019-07-20 RX ORDER — ROSUVASTATIN CALCIUM 10 MG/1
TABLET, COATED ORAL
Qty: 90 TABLET | Refills: 1 | Status: SHIPPED | OUTPATIENT
Start: 2019-07-20 | End: 2020-12-08 | Stop reason: SDUPTHER

## 2019-07-24 DIAGNOSIS — M81.0 AGE-RELATED OSTEOPOROSIS WITHOUT CURRENT PATHOLOGICAL FRACTURE: ICD-10-CM

## 2019-07-24 RX ORDER — ALENDRONATE SODIUM 35 MG/1
35 TABLET ORAL
Qty: 15 TABLET | Refills: 1 | Status: SHIPPED | OUTPATIENT
Start: 2019-07-24 | End: 2020-09-08

## 2019-07-24 NOTE — TELEPHONE ENCOUNTER
Pt Of Nguyễn Nicholas MD    Last seen on: 7/15/2019  Next appt: none  Last refill: 9/10/2018    [place]   [address] [place] [address]  [address]  [place] [address]  Phone: [phone] Fax: [phone]    Please review! Thank you!

## 2019-12-17 LAB
LEFT EYE DM RETINOPATHY: NEGATIVE
RIGHT EYE DM RETINOPATHY: NEGATIVE

## 2019-12-20 ENCOUNTER — TELEPHONE (OUTPATIENT)
Dept: ADMINISTRATIVE | Facility: HOSPITAL | Age: 69
End: 2019-12-20

## 2019-12-23 ENCOUNTER — OFFICE VISIT (OUTPATIENT)
Dept: FAMILY MEDICINE | Facility: CLINIC | Age: 69
End: 2019-12-23
Payer: MEDICARE

## 2019-12-23 VITALS
TEMPERATURE: 98 F | DIASTOLIC BLOOD PRESSURE: 64 MMHG | WEIGHT: 175.94 LBS | HEIGHT: 60 IN | SYSTOLIC BLOOD PRESSURE: 126 MMHG | BODY MASS INDEX: 34.54 KG/M2 | OXYGEN SATURATION: 96 % | HEART RATE: 89 BPM

## 2019-12-23 DIAGNOSIS — J45.901 ASTHMA WITH ACUTE EXACERBATION, UNSPECIFIED ASTHMA SEVERITY, UNSPECIFIED WHETHER PERSISTENT: Primary | ICD-10-CM

## 2019-12-23 PROCEDURE — 1101F PR PT FALLS ASSESS DOC 0-1 FALLS W/OUT INJ PAST YR: ICD-10-PCS | Mod: HCNC,CPTII,S$GLB, | Performed by: NURSE PRACTITIONER

## 2019-12-23 PROCEDURE — 1125F PR PAIN SEVERITY QUANTIFIED, PAIN PRESENT: ICD-10-PCS | Mod: HCNC,S$GLB,, | Performed by: NURSE PRACTITIONER

## 2019-12-23 PROCEDURE — 1159F PR MEDICATION LIST DOCUMENTED IN MEDICAL RECORD: ICD-10-PCS | Mod: HCNC,S$GLB,, | Performed by: NURSE PRACTITIONER

## 2019-12-23 PROCEDURE — 99999 PR PBB SHADOW E&M-EST. PATIENT-LVL V: CPT | Mod: PBBFAC,HCNC,, | Performed by: NURSE PRACTITIONER

## 2019-12-23 PROCEDURE — 1101F PT FALLS ASSESS-DOCD LE1/YR: CPT | Mod: HCNC,CPTII,S$GLB, | Performed by: NURSE PRACTITIONER

## 2019-12-23 PROCEDURE — 99214 PR OFFICE/OUTPT VISIT, EST, LEVL IV, 30-39 MIN: ICD-10-PCS | Mod: HCNC,S$GLB,, | Performed by: NURSE PRACTITIONER

## 2019-12-23 PROCEDURE — 1159F MED LIST DOCD IN RCRD: CPT | Mod: HCNC,S$GLB,, | Performed by: NURSE PRACTITIONER

## 2019-12-23 PROCEDURE — 99999 PR PBB SHADOW E&M-EST. PATIENT-LVL V: ICD-10-PCS | Mod: PBBFAC,HCNC,, | Performed by: NURSE PRACTITIONER

## 2019-12-23 PROCEDURE — 1125F AMNT PAIN NOTED PAIN PRSNT: CPT | Mod: HCNC,S$GLB,, | Performed by: NURSE PRACTITIONER

## 2019-12-23 PROCEDURE — 99214 OFFICE O/P EST MOD 30 MIN: CPT | Mod: HCNC,S$GLB,, | Performed by: NURSE PRACTITIONER

## 2019-12-23 RX ORDER — AMOXICILLIN 500 MG/1
500 CAPSULE ORAL 2 TIMES DAILY
COMMUNITY
End: 2020-08-13

## 2019-12-23 RX ORDER — PREDNISONE 10 MG/1
TABLET ORAL
Qty: 22 TABLET | Refills: 2 | Status: SHIPPED | OUTPATIENT
Start: 2019-12-23 | End: 2021-07-19 | Stop reason: SDUPTHER

## 2019-12-23 RX ORDER — HYDROCODONE BITARTRATE AND HOMATROPINE METHYLBROMIDE ORAL SOLUTION 5; 1.5 MG/5ML; MG/5ML
5 LIQUID ORAL NIGHTLY PRN
Qty: 120 ML | Refills: 0 | Status: SHIPPED | OUTPATIENT
Start: 2019-12-23 | End: 2021-03-15

## 2019-12-23 RX ORDER — HYDROCODONE BITARTRATE AND HOMATROPINE METHYLBROMIDE ORAL SOLUTION 5; 1.5 MG/5ML; MG/5ML
5 LIQUID ORAL NIGHTLY PRN
COMMUNITY
End: 2019-12-23 | Stop reason: SDUPTHER

## 2019-12-23 RX ORDER — AMOXICILLIN AND CLAVULANATE POTASSIUM 875; 125 MG/1; MG/1
1 TABLET, FILM COATED ORAL EVERY 12 HOURS
Qty: 20 TABLET | Refills: 0 | Status: SHIPPED | OUTPATIENT
Start: 2019-12-23 | End: 2020-01-02

## 2019-12-23 NOTE — PROGRESS NOTES
This dictation has been generated using Modal Fluency Dictation some phonetic errors may occur. Please contact author for clarification if needed.     Problem List Items Addressed This Visit     None      Visit Diagnoses     Asthma with acute exacerbation, unspecified asthma severity, unspecified whether persistent    -  Primary        Orders Placed This Encounter    predniSONE (DELTASONE) 10 MG tablet    amoxicillin-clavulanate 875-125mg (AUGMENTIN) 875-125 mg per tablet    hydrocodone-homatropine 5-1.5 mg/5 ml (HYCODAN) 5-1.5 mg/5 mL Syrp     Patient Instructions   Zyrtec(cetirizine) for runny nose and congestion.       Asthma exacerbation likely due to viral etiology however concerned about developing pneumonia treat with meds as above.    Follow up if symptoms worsen or fail to improve.    ________________________________________________________________  ________________________________________________________________      Chief Complaint   Patient presents with    Nasal Congestion    Sore Throat    Cough     History of present illness  This 69 y.o. presents today for complaint of cough and cold issues.  Patient notes symptoms started Friday night with a sore throat.  She notes cough and runny nose.  Saturday and Sunday or symptoms worsen.  She started taking amoxicillin 1 on Saturday and 2 on Sunday.  She has 500 mg prescription for dental procedures.  We discussed antibiotic resistance.  Patient also took an antihistamine likely loratadine.  She had some leftover cough medicine from 2017 Hycodan which she relented took this morning to get some sleep.  She did not take her prednisone burst it is also to years old and request refill.  Review of systems  Fever chills malaise noted. No body aches.  No chest pain.  Notes some shortness of breath and tightness.  No nausea vomiting diarrhea  No rash itching urticaria    Past medical and social history reviewed    Past Medical History:   Diagnosis Date     Age-related osteoporosis without current pathological fracture     Asthma     Diabetes mellitus, type 2     w wt loss off metformin now; diet controlled.    Hypothyroidism     Mild intermittent asthma without complication     on Xolair inj monthly; allergist Dr Nelson.    Osteopenia     Osteopenia     Pre-hypertension     suspected white coat syndrome; also elevated w seeing ortho x2 for pain.    Proteinuria     Pure hypercholesterolemia     Thyroid nodule     Vitamin D insufficiency        Past Surgical History:   Procedure Laterality Date     SECTION      x3    COLONOSCOPY      Dr Butler: 3-4 polyps removed; five-year follow-up recommended    NECK SURGERY  1989    fusion C5-6; 2 discs removed as well.    TOTAL HIP ARTHROPLASTY Right     ; avascular necrosis.    TUBAL LIGATION         Family History   Problem Relation Age of Onset    Arthritis Mother     Cancer Mother         lung ca; smoking.    COPD Mother     Diabetes Maternal Grandmother     Heart disease Maternal Grandfather        Social History     Socioeconomic History    Marital status:      Spouse name: Not on file    Number of children: Not on file    Years of education: Not on file    Highest education level: Not on file   Occupational History    Occupation: [employer]   Social Needs    Financial resource strain: Not on file    Food insecurity:     Worry: Not on file     Inability: Not on file    Transportation needs:     Medical: Not on file     Non-medical: Not on file   Tobacco Use    Smoking status: Former Smoker     Packs/day: 1.00     Years: 30.00     Pack years: 30.00     Last attempt to quit:      Years since quittin.9    Smokeless tobacco: Never Used   Substance and Sexual Activity    Alcohol use: No     Alcohol/week: 0.0 standard drinks    Drug use: No    Sexual activity: Not on file   Lifestyle    Physical activity:     Days per week: Not on file     Minutes per  session: Not on file    Stress: Not on file   Relationships    Social connections:     Talks on phone: Not on file     Gets together: Not on file     Attends Scientologist service: Not on file     Active member of club or organization: Not on file     Attends meetings of clubs or organizations: Not on file     Relationship status: Not on file   Other Topics Concern    Not on file   Social History Narrative    Not on file       Current Outpatient Medications   Medication Sig Dispense Refill    albuterol (VENTOLIN HFA) 90 mcg/actuation inhaler Inhale 2 puffs into the lungs every 6 (six) hours as needed for Wheezing. Rescue      alendronate (FOSAMAX) 35 MG tablet Take 1 tablet (35 mg total) by mouth every 7 days. 15 tablet 1    amoxicillin (AMOXIL) 500 MG capsule Take 500 mg by mouth 2 (two) times daily.      biotin 1 mg tablet Take 1,000 mcg by mouth once daily.      blood sugar diagnostic Strp 1 strip by Misc.(Non-Drug; Combo Route) route 2 (two) times daily. True Draw test strips for BT Imaging machine. 200 each 1    CALCIUM CARBONATE/VITAMIN D3 (CALCIUM 600 + D,3, ORAL) Take 600 mg by mouth 2 (two) times daily.      coenzyme Q10 200 mg capsule Take 200 mg by mouth once daily.      fluticasone-salmeterol 230-21 mcg/dose (ADVAIR HFA) 230-21 mcg/actuation HFAA inhaler Inhale 2 puffs into the lungs 2 (two) times daily. Controller 12 g 2    hydrocodone-homatropine 5-1.5 mg/5 ml (HYCODAN) 5-1.5 mg/5 mL Syrp Take 5 mLs by mouth nightly as needed. 120 mL 0    LUTEIN ORAL Take by mouth once daily at 6am.      meloxicam (MOBIC) 15 MG tablet Take 1 tablet (15 mg total) by mouth once daily. 30 tablet 4    multivitamin (ONE DAILY MULTIVITAMIN) per tablet Take 1 tablet by mouth once daily.      rosuvastatin (CRESTOR) 10 MG tablet TAKE 1 TABLET BY MOUTH IN THE EVENING 90 tablet 1    torsemide (DEMADEX) 20 MG Tab 20 mg po daily as needed for fluid 30 tablet 1    TRUE METRIX AIR GLUCOSE METER [facility]       UNABLE TO FIND  AG Pro - 2 pills twice a day      amoxicillin-clavulanate 875-125mg (AUGMENTIN) 875-125 mg per tablet Take 1 tablet by mouth every 12 (twelve) hours. for 10 days 20 tablet 0    predniSONE (DELTASONE) 10 MG tablet 3 tablets by mouth for 3 days then 2 tablets for 4 days then 1 tablet for 5 days. 22 tablet 2     No current facility-administered medications for this visit.        Review of patient's allergies indicates:   Allergen Reactions    Byetta [exenatide]     Flu vaccine 2011-12(3 yr+)(pf)       Flu vaccine 1999 pneumonitis & in hospital for 1 month/ ICU for 1 week    Metformin      rash    Pravastatin sodium      elev LFT    Prinivil [lisinopril]      Cough      Singulair [montelukast]     Soma [carisoprodol]     Vioxx [rofecoxib]     Avelox [moxifloxacin] Rash       Physical examination  Vitals Reviewed  Gen. Well-dressed well-nourished   Skin warm dry and intact.  No rashes noted.  HEENT.  TM intact bilateral with normal light reflex.  No mastoid tenderness during percussion.  Nares patent bilateral.  Pharynx is unremarkable except postnasal drip.  No maxillary or frontal sinus tenderness when percussed.    Neck is supple without adenopathy  Chest.  Respirations are even unlabored.  Lungs are clear to auscultation.  Cardiac regular rate and rhythm.  No chest wall adenopathy noted.  Neuro. Awake alert oriented x4.  Normal judgment and cognition noted.  Extremities no clubbing cyanosis or edema noted.     Call or return to clinic prn if these symptoms worsen or fail to improve as anticipated.

## 2020-01-07 DIAGNOSIS — E11.9 TYPE 2 DIABETES MELLITUS WITHOUT COMPLICATION, WITHOUT LONG-TERM CURRENT USE OF INSULIN: ICD-10-CM

## 2020-01-07 RX ORDER — CALCIUM CITRATE/VITAMIN D3 200MG-6.25
TABLET ORAL
Qty: 200 STRIP | Refills: 1 | Status: SHIPPED | OUTPATIENT
Start: 2020-01-07 | End: 2020-07-12

## 2020-01-07 RX ORDER — DIPHENHYDRAMINE HCL 25 MG
TABLET ORAL
Qty: 1 EACH | Refills: 0 | Status: SHIPPED | OUTPATIENT
Start: 2020-01-07

## 2020-01-13 ENCOUNTER — LAB VISIT (OUTPATIENT)
Dept: LAB | Facility: HOSPITAL | Age: 70
End: 2020-01-13
Attending: INTERNAL MEDICINE
Payer: MEDICARE

## 2020-01-13 DIAGNOSIS — E55.9 VITAMIN D INSUFFICIENCY: ICD-10-CM

## 2020-01-13 DIAGNOSIS — E11.9 TYPE 2 DIABETES MELLITUS WITHOUT COMPLICATION, WITHOUT LONG-TERM CURRENT USE OF INSULIN: ICD-10-CM

## 2020-01-13 DIAGNOSIS — M85.852 OSTEOPENIA OF NECK OF LEFT FEMUR: ICD-10-CM

## 2020-01-13 DIAGNOSIS — E03.9 HYPOTHYROIDISM, UNSPECIFIED TYPE: ICD-10-CM

## 2020-01-13 DIAGNOSIS — E78.49 OTHER HYPERLIPIDEMIA: ICD-10-CM

## 2020-01-13 DIAGNOSIS — J45.20 MILD INTERMITTENT ASTHMA WITHOUT COMPLICATION: ICD-10-CM

## 2020-01-13 DIAGNOSIS — R60.0 BILATERAL LOWER EXTREMITY EDEMA: ICD-10-CM

## 2020-01-13 DIAGNOSIS — E66.09 CLASS 1 OBESITY DUE TO EXCESS CALORIES WITH SERIOUS COMORBIDITY AND BODY MASS INDEX (BMI) OF 33.0 TO 33.9 IN ADULT: ICD-10-CM

## 2020-01-13 LAB
ALBUMIN SERPL BCP-MCNC: 3.8 G/DL (ref 3.5–5.2)
ALP SERPL-CCNC: 59 U/L (ref 55–135)
ALT SERPL W/O P-5'-P-CCNC: 26 U/L (ref 10–44)
ANION GAP SERPL CALC-SCNC: 7 MMOL/L (ref 8–16)
AST SERPL-CCNC: 27 U/L (ref 10–40)
BASOPHILS # BLD AUTO: 0.06 K/UL (ref 0–0.2)
BASOPHILS NFR BLD: 1.1 % (ref 0–1.9)
BILIRUB SERPL-MCNC: 0.6 MG/DL (ref 0.1–1)
BUN SERPL-MCNC: 17 MG/DL (ref 8–23)
CALCIUM SERPL-MCNC: 9.2 MG/DL (ref 8.7–10.5)
CHLORIDE SERPL-SCNC: 106 MMOL/L (ref 95–110)
CHOLEST SERPL-MCNC: 175 MG/DL (ref 120–199)
CHOLEST/HDLC SERPL: 2.1 {RATIO} (ref 2–5)
CO2 SERPL-SCNC: 29 MMOL/L (ref 23–29)
CREAT SERPL-MCNC: 0.8 MG/DL (ref 0.5–1.4)
DIFFERENTIAL METHOD: NORMAL
EOSINOPHIL # BLD AUTO: 0.2 K/UL (ref 0–0.5)
EOSINOPHIL NFR BLD: 3 % (ref 0–8)
ERYTHROCYTE [DISTWIDTH] IN BLOOD BY AUTOMATED COUNT: 12.6 % (ref 11.5–14.5)
EST. GFR  (AFRICAN AMERICAN): >60 ML/MIN/1.73 M^2
EST. GFR  (NON AFRICAN AMERICAN): >60 ML/MIN/1.73 M^2
ESTIMATED AVG GLUCOSE: 163 MG/DL (ref 68–131)
GLUCOSE SERPL-MCNC: 137 MG/DL (ref 70–110)
HBA1C MFR BLD HPLC: 7.3 % (ref 4–5.6)
HCT VFR BLD AUTO: 44.5 % (ref 37–48.5)
HDLC SERPL-MCNC: 85 MG/DL (ref 40–75)
HDLC SERPL: 48.6 % (ref 20–50)
HGB BLD-MCNC: 14.4 G/DL (ref 12–16)
IMM GRANULOCYTES # BLD AUTO: 0.02 K/UL (ref 0–0.04)
IMM GRANULOCYTES NFR BLD AUTO: 0.4 % (ref 0–0.5)
LDLC SERPL CALC-MCNC: 73.8 MG/DL (ref 63–159)
LYMPHOCYTES # BLD AUTO: 2.5 K/UL (ref 1–4.8)
LYMPHOCYTES NFR BLD: 44.6 % (ref 18–48)
MCH RBC QN AUTO: 29.1 PG (ref 27–31)
MCHC RBC AUTO-ENTMCNC: 32.4 G/DL (ref 32–36)
MCV RBC AUTO: 90 FL (ref 82–98)
MONOCYTES # BLD AUTO: 0.4 K/UL (ref 0.3–1)
MONOCYTES NFR BLD: 6.3 % (ref 4–15)
NEUTROPHILS # BLD AUTO: 2.5 K/UL (ref 1.8–7.7)
NEUTROPHILS NFR BLD: 44.6 % (ref 38–73)
NONHDLC SERPL-MCNC: 90 MG/DL
NRBC BLD-RTO: 0 /100 WBC
PLATELET # BLD AUTO: 218 K/UL (ref 150–350)
PMV BLD AUTO: 10.9 FL (ref 9.2–12.9)
POTASSIUM SERPL-SCNC: 4.2 MMOL/L (ref 3.5–5.1)
PROT SERPL-MCNC: 7.2 G/DL (ref 6–8.4)
RBC # BLD AUTO: 4.94 M/UL (ref 4–5.4)
SODIUM SERPL-SCNC: 142 MMOL/L (ref 136–145)
T3FREE SERPL-MCNC: 2.7 PG/ML (ref 2.3–4.2)
T4 FREE SERPL-MCNC: 1.01 NG/DL (ref 0.71–1.51)
TRIGL SERPL-MCNC: 81 MG/DL (ref 30–150)
TSH SERPL DL<=0.005 MIU/L-ACNC: 3.81 UIU/ML (ref 0.4–4)
WBC # BLD AUTO: 5.58 K/UL (ref 3.9–12.7)

## 2020-01-13 PROCEDURE — 84481 FREE ASSAY (FT-3): CPT | Mod: HCNC

## 2020-01-13 PROCEDURE — 85025 COMPLETE CBC W/AUTO DIFF WBC: CPT | Mod: HCNC

## 2020-01-13 PROCEDURE — 36415 COLL VENOUS BLD VENIPUNCTURE: CPT | Mod: HCNC,PO

## 2020-01-13 PROCEDURE — 80061 LIPID PANEL: CPT | Mod: HCNC

## 2020-01-13 PROCEDURE — 83036 HEMOGLOBIN GLYCOSYLATED A1C: CPT | Mod: HCNC

## 2020-01-13 PROCEDURE — 80053 COMPREHEN METABOLIC PANEL: CPT | Mod: HCNC

## 2020-01-13 PROCEDURE — 84439 ASSAY OF FREE THYROXINE: CPT | Mod: HCNC

## 2020-01-13 PROCEDURE — 84443 ASSAY THYROID STIM HORMONE: CPT | Mod: HCNC

## 2020-01-13 PROCEDURE — 82306 VITAMIN D 25 HYDROXY: CPT | Mod: HCNC

## 2020-01-14 LAB — 25(OH)D3+25(OH)D2 SERPL-MCNC: 41 NG/ML (ref 30–96)

## 2020-01-17 DIAGNOSIS — E11.9 TYPE 2 DIABETES MELLITUS WITHOUT COMPLICATION, WITHOUT LONG-TERM CURRENT USE OF INSULIN: ICD-10-CM

## 2020-01-21 ENCOUNTER — OFFICE VISIT (OUTPATIENT)
Dept: FAMILY MEDICINE | Facility: CLINIC | Age: 70
End: 2020-01-21
Payer: MEDICARE

## 2020-01-21 VITALS
TEMPERATURE: 98 F | SYSTOLIC BLOOD PRESSURE: 118 MMHG | OXYGEN SATURATION: 96 % | BODY MASS INDEX: 34.38 KG/M2 | WEIGHT: 175.13 LBS | DIASTOLIC BLOOD PRESSURE: 80 MMHG | HEIGHT: 60 IN | HEART RATE: 87 BPM

## 2020-01-21 DIAGNOSIS — M85.80 OSTEOPENIA, UNSPECIFIED LOCATION: ICD-10-CM

## 2020-01-21 DIAGNOSIS — E55.9 VITAMIN D INSUFFICIENCY: ICD-10-CM

## 2020-01-21 DIAGNOSIS — E03.9 HYPOTHYROIDISM, UNSPECIFIED TYPE: ICD-10-CM

## 2020-01-21 DIAGNOSIS — E78.49 OTHER HYPERLIPIDEMIA: ICD-10-CM

## 2020-01-21 DIAGNOSIS — E11.9 TYPE 2 DIABETES MELLITUS WITHOUT COMPLICATION, WITHOUT LONG-TERM CURRENT USE OF INSULIN: ICD-10-CM

## 2020-01-21 DIAGNOSIS — J45.20 MILD INTERMITTENT ASTHMA WITHOUT COMPLICATION: Primary | ICD-10-CM

## 2020-01-21 DIAGNOSIS — E04.2 MULTIPLE THYROID NODULES: ICD-10-CM

## 2020-01-21 PROCEDURE — 1101F PR PT FALLS ASSESS DOC 0-1 FALLS W/OUT INJ PAST YR: ICD-10-PCS | Mod: HCNC,CPTII,S$GLB, | Performed by: INTERNAL MEDICINE

## 2020-01-21 PROCEDURE — 99214 OFFICE O/P EST MOD 30 MIN: CPT | Mod: HCNC,S$GLB,, | Performed by: INTERNAL MEDICINE

## 2020-01-21 PROCEDURE — 1159F MED LIST DOCD IN RCRD: CPT | Mod: HCNC,S$GLB,, | Performed by: INTERNAL MEDICINE

## 2020-01-21 PROCEDURE — 99999 PR PBB SHADOW E&M-EST. PATIENT-LVL III: ICD-10-PCS | Mod: PBBFAC,HCNC,, | Performed by: INTERNAL MEDICINE

## 2020-01-21 PROCEDURE — 1159F PR MEDICATION LIST DOCUMENTED IN MEDICAL RECORD: ICD-10-PCS | Mod: HCNC,S$GLB,, | Performed by: INTERNAL MEDICINE

## 2020-01-21 PROCEDURE — 99499 UNLISTED E&M SERVICE: CPT | Mod: HCNC,S$GLB,, | Performed by: INTERNAL MEDICINE

## 2020-01-21 PROCEDURE — 99999 PR PBB SHADOW E&M-EST. PATIENT-LVL III: CPT | Mod: PBBFAC,HCNC,, | Performed by: INTERNAL MEDICINE

## 2020-01-21 PROCEDURE — 99499 RISK ADDL DX/OHS AUDIT: ICD-10-PCS | Mod: HCNC,S$GLB,, | Performed by: INTERNAL MEDICINE

## 2020-01-21 PROCEDURE — 1101F PT FALLS ASSESS-DOCD LE1/YR: CPT | Mod: HCNC,CPTII,S$GLB, | Performed by: INTERNAL MEDICINE

## 2020-01-21 PROCEDURE — 99214 PR OFFICE/OUTPT VISIT, EST, LEVL IV, 30-39 MIN: ICD-10-PCS | Mod: HCNC,S$GLB,, | Performed by: INTERNAL MEDICINE

## 2020-01-21 NOTE — PROGRESS NOTES
Subjective:       Patient ID: Breanna Silva is a 69 y.o. female.    Chief Complaint: Follow-up (review lab results)    HPI  here today for reassessment and go over lab results.  Mild intermittent asthma without complication:  Has been doing well has albuterol to use as rescue inhaler; uses Advair inhaler 230/212 puffs twice a day.  Diabetes mellitus type 2:  Blood sugars in the morning been 130s to 140s; her 1st meal is around 11 30 she skips breakfast; blood sugars run around 118-120 at lunch then around 90s with dinner; diabetes is diet controlled.  Other hyperlipidemia:  Not always on low-fat high-fiber diet; tolerates her rosuvastatin fine.  History of hypothyroidism:  Off levothyroxine for 2 years now.  TSH checked periodically/conservatively.  Multiple thyroid nodules; last thyroid ultrasound; 07/08/2019: 1. Heterogeneous small thyroid gland as can be seen with a history of thyroiditis or Graves disease.  Regions of subcentimeter nodularity are noted without convincing detrimental change when comparison is made with the prior  Obesity:  BMI 34.21; exercises every morning with bed support due to her back; knows the importance of small portions to help her weight come down.      Review of Systems   Constitutional: Negative for fever and unexpected weight change.   HENT: Negative for congestion, postnasal drip and rhinorrhea.    Respiratory: Negative for cough, chest tightness, shortness of breath and wheezing.    Cardiovascular: Negative for chest pain, palpitations and leg swelling.   Gastrointestinal: Negative for abdominal pain, blood in stool, constipation, diarrhea, nausea and vomiting.   Genitourinary: Negative for dysuria and hematuria.   Skin: Negative for rash.   Allergic/Immunologic: Negative for environmental allergies and food allergies.   Neurological: Negative for syncope and weakness.   Psychiatric/Behavioral: Negative for dysphoric mood. The patient is not nervous/anxious.        Objective:       Vitals:    01/21/20 1044   BP: 118/80   BP Location: Right arm   Patient Position: Sitting   BP Method: Medium (Manual)   Pulse: 87   Temp: 98.2 °F (36.8 °C)   TempSrc: Oral   SpO2: 96%   Weight: 79.5 kg (175 lb 2.5 oz)   Height: 5' (1.524 m)     Body mass index is 34.21 kg/m².    Wt Readings from Last 3 Encounters:   01/21/20 79.5 kg (175 lb 2.5 oz)   12/23/19 79.8 kg (175 lb 14.8 oz)   09/30/19 77.6 kg (171 lb)       Physical Exam   Constitutional: She is oriented to person, place, and time. She appears well-developed and well-nourished.   HENT:   Head: Normocephalic and atraumatic.   Eyes: EOM are normal.   Neck: Normal range of motion. Neck supple. No thyromegaly present.   No carotid bruits heard.    Cardiovascular: Normal rate, regular rhythm and normal heart sounds. Exam reveals no gallop.   No murmur heard.  Pulmonary/Chest: Effort normal and breath sounds normal. No respiratory distress. She has no wheezes. She has no rales.   Slightly decreased BS's.    Abdominal: Soft. Bowel sounds are normal. She exhibits no distension. There is no tenderness. There is no rebound and no guarding.   Musculoskeletal: Normal range of motion. She exhibits no edema.   Lymphadenopathy:     She has no cervical adenopathy.   Neurological: She is alert and oriented to person, place, and time.   Moves all 4 extremities fine.   Skin: No rash noted.   Psychiatric: She has a normal mood and affect. Her behavior is normal. Thought content normal.   Vitals reviewed.      Assessment:       1. Mild intermittent asthma without complication    2. Other hyperlipidemia    3. Type 2 diabetes mellitus without complication, without long-term current use of insulin    4. Multiple thyroid nodules    5. Hypothyroidism, unspecified type    6. Osteopenia, unspecified location    7. Vitamin D insufficiency        Plan:       Mild intermittent asthma without complication; not needing her rescue; uses advair BID    Other hyperlipidemia.Maintain low  fat high fiber diet, exercise regularly. Weight reduction where indicated. Cont rosuvastatin  -     Lipid panel; Future; Expected date: 01/21/2020  -     Comprehensive metabolic panel; Future; Expected date: 01/21/2020    Type 2 diabetes mellitus without complication, without long-term current use of insulin; diet controlled.   -     Hemoglobin A1c; Future; Expected date: 01/21/2020  -     Urinalysis; Future; Expected date: 01/21/2020  -     Comprehensive metabolic panel; Future; Expected date: 01/21/2020    Multiple thyroid nodules; TSH needed as well w labs; US thyroid w f/u.   -     US Soft Tissue Head Neck Thyroid; Future; Expected date: 01/21/2020  -     T4, free; Future; Expected date: 01/21/2020  -     T3, free; Future; Expected date: 01/21/2020    Hypothyroidism, unspecified type; wants to stay off levothyroxine as suspect led to hair loss. TSH cont to improve; follow TFT's. .rk  -     Lipid panel; Future; Expected date: 01/21/2020  -     TSH; Future; Expected date: 01/21/2020  -     T4, free; Future; Expected date: 01/21/2020  -     T3, free; Future; Expected date: 01/21/2020    Osteopenia, unspecified location.Weight bearing exercises, continue calcium and vit D supplements. DEXA scabn due in fall 2020.   -     Vitamin D; Future; Expected date: 01/21/2020    Vitamin D insufficiency  -     Vitamin D; Future; Expected date: 01/21/2020    Wt Readings from Last 3 Encounters:   01/21/20 79.5 kg (175 lb 2.5 oz)   12/23/19 79.8 kg (175 lb 14.8 oz)   09/30/19 77.6 kg (171 lb)

## 2020-05-21 ENCOUNTER — HOSPITAL ENCOUNTER (OUTPATIENT)
Dept: RADIOLOGY | Facility: HOSPITAL | Age: 70
Discharge: HOME | End: 2020-05-21
Attending: PHYSICIAN ASSISTANT
Payer: MEDICARE

## 2020-05-21 ENCOUNTER — OFFICE VISIT (OUTPATIENT)
Dept: SPINE | Facility: CLINIC | Age: 70
End: 2020-05-21
Payer: MEDICARE

## 2020-05-21 VITALS
HEART RATE: 91 BPM | DIASTOLIC BLOOD PRESSURE: 77 MMHG | HEIGHT: 60 IN | WEIGHT: 176.06 LBS | SYSTOLIC BLOOD PRESSURE: 158 MMHG | TEMPERATURE: 99 F | BODY MASS INDEX: 34.57 KG/M2

## 2020-05-21 DIAGNOSIS — M54.50 LOW BACK PAIN, NON-SPECIFIC: ICD-10-CM

## 2020-05-21 DIAGNOSIS — M54.50 ACUTE MIDLINE LOW BACK PAIN WITHOUT SCIATICA: Primary | ICD-10-CM

## 2020-05-21 DIAGNOSIS — M54.50 ACUTE MIDLINE LOW BACK PAIN WITHOUT SCIATICA: ICD-10-CM

## 2020-05-21 PROCEDURE — 99203 PR OFFICE/OUTPT VISIT, NEW, LEVL III, 30-44 MIN: ICD-10-PCS | Mod: HCNC,S$GLB,, | Performed by: PHYSICIAN ASSISTANT

## 2020-05-21 PROCEDURE — 1101F PR PT FALLS ASSESS DOC 0-1 FALLS W/OUT INJ PAST YR: ICD-10-PCS | Mod: HCNC,CPTII,S$GLB, | Performed by: PHYSICIAN ASSISTANT

## 2020-05-21 PROCEDURE — 1159F PR MEDICATION LIST DOCUMENTED IN MEDICAL RECORD: ICD-10-PCS | Mod: HCNC,S$GLB,, | Performed by: PHYSICIAN ASSISTANT

## 2020-05-21 PROCEDURE — 72110 X-RAY EXAM L-2 SPINE 4/>VWS: CPT | Mod: TC,HCNC,PO

## 2020-05-21 PROCEDURE — 1125F AMNT PAIN NOTED PAIN PRSNT: CPT | Mod: HCNC,S$GLB,, | Performed by: PHYSICIAN ASSISTANT

## 2020-05-21 PROCEDURE — 1101F PT FALLS ASSESS-DOCD LE1/YR: CPT | Mod: HCNC,CPTII,S$GLB, | Performed by: PHYSICIAN ASSISTANT

## 2020-05-21 PROCEDURE — 99999 PR PBB SHADOW E&M-EST. PATIENT-LVL V: CPT | Mod: PBBFAC,HCNC,, | Performed by: PHYSICIAN ASSISTANT

## 2020-05-21 PROCEDURE — 72110 XR LUMBAR SPINE 5 VIEW WITH FLEX AND EXT: ICD-10-PCS | Mod: 26,HCNC,, | Performed by: RADIOLOGY

## 2020-05-21 PROCEDURE — 99999 PR PBB SHADOW E&M-EST. PATIENT-LVL V: ICD-10-PCS | Mod: PBBFAC,HCNC,, | Performed by: PHYSICIAN ASSISTANT

## 2020-05-21 PROCEDURE — 72110 X-RAY EXAM L-2 SPINE 4/>VWS: CPT | Mod: 26,HCNC,, | Performed by: RADIOLOGY

## 2020-05-21 PROCEDURE — 99203 OFFICE O/P NEW LOW 30 MIN: CPT | Mod: HCNC,S$GLB,, | Performed by: PHYSICIAN ASSISTANT

## 2020-05-21 PROCEDURE — 1125F PR PAIN SEVERITY QUANTIFIED, PAIN PRESENT: ICD-10-PCS | Mod: HCNC,S$GLB,, | Performed by: PHYSICIAN ASSISTANT

## 2020-05-21 PROCEDURE — 1159F MED LIST DOCD IN RCRD: CPT | Mod: HCNC,S$GLB,, | Performed by: PHYSICIAN ASSISTANT

## 2020-05-21 RX ORDER — TIZANIDINE 4 MG/1
4 TABLET ORAL EVERY 12 HOURS PRN
Qty: 40 TABLET | Refills: 0 | Status: SHIPPED | OUTPATIENT
Start: 2020-05-21 | End: 2022-06-14

## 2020-05-21 NOTE — LETTER
May 22, 2020      Nguyễn Nicholas MD  [address]  [place] [address]           [facility]  [address] [place] [address]  Phone: [phone]  Fax: [phone]          Patient: Breanna Silva   MR Number: [id number]   YOB: 1950   Date of Visit: 5/21/2020       Dear Dr. Nguyễn Nicholas:    Thank you for referring Breanna Silva to me for evaluation. Attached you will find relevant portions of my assessment and plan of care.    If you have questions, please do not hesitate to call me. I look forward to following Breanna Silva along with you.    Sincerely,    Madeline Capps PA-C    Enclosure  CC:  No Recipients    If you would like to receive this communication electronically, please contact [email or web address] or [phone] to request more information on [employer] [facility] access.    For providers and/or their staff who would like to refer a patient to [facility], please contact us through our one-stop-shop provider referral line, [facility], at [phone].    If you feel you have received this communication in error or would no longer like to receive these types of communications, please e-mail [email or web address]

## 2020-05-21 NOTE — PROGRESS NOTES
Hello! We are asking you to complete following questionnaire prior to your upcoming virtual visit with Madeline Capps. This questionnaire has been designed to give us information on how your low back or leg pain has affected your ability to manage everyday life. Please respond with only ONE number answer to each question which best applies to you TODAY. This will need to be completed and sent back prior to checking in for your appointment.    EXAMPLE OF RESPONSE:  Q1: 2       Q2: 1      Q3: 4     Q1: Pain Intensity 3  0 - No pain  1 - Very Mild pain   2 - Moderate Pain  3 - Fairly Severe Pain  4 - Very Severe Pain  5 - Worst Imaginable Pain    Q2: Personal Care (washing, dressing, etc) 1  0 - I can look after myself normally without causing extra pain  1 - I can look after myself normally but it causes extra pain  2 - It is painful to look after myself and I am slow and careful  3 - I need some help but manage most of my personal care   4 - I need help everyday in most aspects of self-care   5 - I do not get dressed, I wash with difficulty and stay in bed    Q3: Lifting 4  0 - I can lift heavy weights without extra pain  1 - I can lift heavy weights but it gives extra pain   2 - Pain prevents me from lifting heavy weights off the floor, but I can manage if they are conveniently placed (eg. on a table)  3 - Pain prevents me from lifting heavy weights, but I can manage light to medium weights if they are conveniently positioned   4 - I can lift very light weights   5 - I cannot lift or carry anything at all     Q4: Walking 4  0 - Pain does not prevent me walking any distance   1 - Pain prevents me from walking more than 1 mile  2 - Pain prevents me from walking more than 1/2 mile  3 - Pain prevents me from walking more than 100 yards  4 - I can only walk using a stick or crutches  5 - I am in bed most of the time    Q5: Sitting 4  0 - I can sit in any chair as long as I like   1 - I can only sit in my favorite chair  as long as I like   2 - Pain prevents me from sitting for more than one hour   3 - Pain prevents me from sitting for more than 30 minutes   4 - Pain prevents me from sitting for more than 10 minutes   5 - Pain prevents me from sitting at all     Q6: Standing 3  0 - I can stand as long as I want without extra pain  1 - I can stand as long as I want but it gives me extra pain  2 - Pain prevents me from standing for more than 1 hour   3 - Pain prevents me from standing for more than 30 minutes   4 - Pain prevents me from standing for more than 10 minutes   5 - Pain prevents me from standing at all     Q7: Sleeping 2  0 - My sleep is never disturbed by pain   1 - My sleep is occasionally disturbed by pain   2 - Because of pain, I have less than 6 hours of sleep  3 - Because of pain, I have less than 4 hours of sleep  4 - Because of pain, I have less than 2 hours of sleep  5 - Pain prevents me from sleeping at all    Q8: Sex Life (if applicable) N/A  0 - My sex life is normal and causes no extra pain  1 - My sex life is normal but causes some extra pain   2 - My sex life is nearly normal but is very painful   3 - My sex life is severely restricted by pain  4 - My sex life is nearly absent because of pain  5 - Pain prevents any sex life at all    Q9: Social Life 4  0 - My social life is normal and gives me no extra pain  1 - My social life is normal but increases the degree of pain  2 - Pain has no significant effect on my social life apart from limiting my more energetic interests such as sports   3 - Pain has restricted my social life and I do not go out as often   4 - Pain has restricted my social life to my house  5 - I have no social life because of pain    Q10: Traveling 5  0 - I can travel anywhere without pain  1 - I can travel anywhere but it gives me extra pain   2 - Pain is bad but I manage journeys over 2 hours   3 - Pain restricts me to journeys of less than 1 hour  4 - Pain restricts me to short necessary  journeys under 30 minutes   5 - Pain prevents me from traveling except to receive treatment

## 2020-05-21 NOTE — PROGRESS NOTES
Back and Spine Consult    Patient ID: Breanna Silva is a 69 y.o. female.    Chief Complaint   Patient presents with    Low-back Pain     She has had intense low back pain since bending over to dust Sunday and this morning she couldn't walk. Pain is constant and radiates down into her left buttock, causing her to not be able to sit. Turning, twisting, and bending makes pain worse. Something feels like she is being stuck by an ice pick. Getting in the hot shower helps. She has had 2 neck fusions and has been told she needs surgery on her low back.       Review of Systems   Constitutional: Negative for activity change, appetite change, chills, fever and unexpected weight change.   HENT: Negative for tinnitus, trouble swallowing and voice change.    Respiratory: Negative for apnea, cough, chest tightness and shortness of breath.    Cardiovascular: Negative for chest pain and palpitations.   Gastrointestinal: Negative for constipation, diarrhea, nausea and vomiting.   Genitourinary: Negative for difficulty urinating, dysuria, frequency and urgency.   Musculoskeletal: Positive for back pain and myalgias. Negative for gait problem, neck pain and neck stiffness.   Skin: Negative for wound.   Neurological: Negative for dizziness, tremors, seizures, facial asymmetry, speech difficulty, weakness, light-headedness, numbness and headaches.   Psychiatric/Behavioral: Negative for confusion and decreased concentration.       Past Medical History:   Diagnosis Date    Age-related osteoporosis without current pathological fracture     Asthma     Diabetes mellitus, type 2     w wt loss off metformin now; diet controlled.    Hypothyroidism     Mild intermittent asthma without complication     on Xolair inj monthly; allergist Dr Nelson.    Osteopenia     Osteopenia     Pre-hypertension     suspected white coat syndrome; also elevated w seeing ortho x2 for pain.    Proteinuria     Pure hypercholesterolemia     Thyroid  nodule     Vitamin D insufficiency      Social History     Socioeconomic History    Marital status:      Spouse name: Not on file    Number of children: Not on file    Years of education: Not on file    Highest education level: Not on file   Occupational History    Occupation: [employer]   Social Needs    Financial resource strain: Not on file    Food insecurity:     Worry: Not on file     Inability: Not on file    Transportation needs:     Medical: Not on file     Non-medical: Not on file   Tobacco Use    Smoking status: Former Smoker     Packs/day: 1.00     Years: 30.00     Pack years: 30.00     Last attempt to quit:      Years since quittin.4    Smokeless tobacco: Never Used   Substance and Sexual Activity    Alcohol use: No     Alcohol/week: 0.0 standard drinks    Drug use: No    Sexual activity: Not on file   Lifestyle    Physical activity:     Days per week: Not on file     Minutes per session: Not on file    Stress: Not on file   Relationships    Social connections:     Talks on phone: Not on file     Gets together: Not on file     Attends Caodaism service: Not on file     Active member of club or organization: Not on file     Attends meetings of clubs or organizations: Not on file     Relationship status: Not on file   Other Topics Concern    Not on file   Social History Narrative    Not on file     Family History   Problem Relation Age of Onset    Arthritis Mother     Cancer Mother         lung ca; smoking.    COPD Mother     Diabetes Maternal Grandmother     Heart disease Maternal Grandfather      Review of patient's allergies indicates:   Allergen Reactions    Byetta [exenatide]     Flu vaccine 2011(3 yr+)(pf)       Flu vaccine  pneumonitis & in hospital for 1 month/ ICU for 1 week    Metformin      rash    Pravastatin sodium      elev LFT    Prinivil [lisinopril]      Cough      Singulair [montelukast]     Soma [carisoprodol]     Vioxx [rofecoxib]      Avelox [moxifloxacin] Rash       Current Outpatient Medications:     albuterol (VENTOLIN HFA) 90 mcg/actuation inhaler, Inhale 2 puffs into the lungs every 6 (six) hours as needed for Wheezing. Rescue, Disp: , Rfl:     alendronate (FOSAMAX) 35 MG tablet, Take 1 tablet (35 mg total) by mouth every 7 days., Disp: 15 tablet, Rfl: 1    biotin 1 mg tablet, Take 1,000 mcg by mouth once daily., Disp: , Rfl:     CALCIUM CARBONATE/VITAMIN D3 (CALCIUM 600 + D,3, ORAL), Take 600 mg by mouth 2 (two) times daily., Disp: , Rfl:     coenzyme Q10 200 mg capsule, Take 200 mg by mouth once daily., Disp: , Rfl:     fluticasone-salmeterol 230-21 mcg/dose (ADVAIR HFA) 230-21 mcg/actuation HFAA inhaler, Inhale 2 puffs into the lungs 2 (two) times daily. Controller, Disp: 12 g, Rfl: 2    LUTEIN ORAL, Take by mouth once daily at 6am., Disp: , Rfl:     multivitamin (ONE DAILY MULTIVITAMIN) per tablet, Take 1 tablet by mouth once daily., Disp: , Rfl:     rosuvastatin (CRESTOR) 10 MG tablet, TAKE 1 TABLET BY MOUTH IN THE EVENING, Disp: 90 tablet, Rfl: 1    torsemide (DEMADEX) 20 MG Tab, 20 mg po daily as needed for fluid, Disp: 30 tablet, Rfl: 1    TRUE METRIX AIR GLUCOSE METER Misc, USE AS DIRECTED, Disp: 1 each, Rfl: 0    TRUE METRIX GLUCOSE TEST STRIP Strp, USE 1 STRIP TO CHECK GLUCOSE TWICE DAILY, Disp: 200 strip, Rfl: 1    UNABLE TO FIND, AG Pro - 2 pills twice a day, Disp: , Rfl:     amoxicillin (AMOXIL) 500 MG capsule, Take 500 mg by mouth 2 (two) times daily., Disp: , Rfl:     hydrocodone-homatropine 5-1.5 mg/5 ml (HYCODAN) 5-1.5 mg/5 mL Syrp, Take 5 mLs by mouth nightly as needed. (Patient not taking: Reported on 1/21/2020), Disp: 120 mL, Rfl: 0    meloxicam (MOBIC) 15 MG tablet, Take 1 tablet (15 mg total) by mouth once daily. (Patient not taking: Reported on 5/21/2020), Disp: 30 tablet, Rfl: 4    predniSONE (DELTASONE) 10 MG tablet, 3 tablets by mouth for 3 days then 2 tablets for 4 days then 1 tablet for 5  days. (Patient not taking: Reported on 5/21/2020), Disp: 22 tablet, Rfl: 2    tiZANidine (ZANAFLEX) 4 MG tablet, Take 1 tablet (4 mg total) by mouth every 12 (twelve) hours as needed (muscle spasms/ pain)., Disp: 40 tablet, Rfl: 0    Vitals:    05/21/20 1454   BP: (!) 158/77   BP Location: Left arm   Patient Position: Sitting   BP Method: Medium (Automatic)   Pulse: 91   Temp: 98.9 °F (37.2 °C)   Weight: 79.9 kg (176 lb 0.6 oz)   Height: 5' (1.524 m)       Physical Exam   Constitutional: She is oriented to person, place, and time. She appears well-developed and well-nourished.   HENT:   Head: Normocephalic and atraumatic.   Eyes: Pupils are equal, round, and reactive to light.   Neck: Normal range of motion. Neck supple.   Cardiovascular: Normal rate.   Pulmonary/Chest: Effort normal.   Musculoskeletal: Normal range of motion. She exhibits no edema.   Neurological: She is alert and oriented to person, place, and time. She has a normal Finger-Nose-Finger Test, a normal Heel to Shin Test, a normal Romberg Test and a normal Tandem Gait Test. Gait normal.   Reflex Scores:       Tricep reflexes are 2+ on the right side and 2+ on the left side.       Bicep reflexes are 2+ on the right side and 2+ on the left side.       Brachioradialis reflexes are 2+ on the right side and 2+ on the left side.       Patellar reflexes are 2+ on the right side and 2+ on the left side.       Achilles reflexes are 2+ on the right side and 2+ on the left side.  Skin: Skin is warm, dry and intact.   Psychiatric: She has a normal mood and affect. Her speech is normal and behavior is normal. Judgment and thought content normal.   Nursing note and vitals reviewed.      Neurologic Exam     Mental Status   Oriented to person, place, and time.   Oriented to person.   Oriented to place.   Oriented to time.   Follows 3 step commands.   Attention: normal. Concentration: normal.   Speech: speech is normal   Level of consciousness: alert  Knowledge:  consistent with education.   Able to name object. Able to read. Able to repeat. Able to write. Normal comprehension.     Cranial Nerves     CN II   Visual acuity: normal  Right visual field deficit: none  Left visual field deficit: none     CN III, IV, VI   Pupils are equal, round, and reactive to light.  Right pupil: Size: 3 mm. Shape: regular. Reactivity: brisk. Consensual response: intact.   Left pupil: Size: 3 mm. Shape: regular. Reactivity: brisk. Consensual response: intact.   CN III: no CN III palsy  CN VI: no CN VI palsy  Nystagmus: none   Diplopia: none  Ophthalmoparesis: none  Conjugate gaze: present    CN V   Right facial sensation deficit: none  Left facial sensation deficit: none    CN VII   Right facial weakness: none  Left facial weakness: none    CN VIII   Hearing: intact    CN IX, X   CN IX normal.   CN X normal.     CN XI   Right sternocleidomastoid strength: normal  Left sternocleidomastoid strength: normal  Right trapezius strength: normal  Left trapezius strength: normal    CN XII   Fasciculations: absent  Tongue deviation: none    Motor Exam   Muscle bulk: normal  Overall muscle tone: normal  Right arm pronator drift: absent  Left arm pronator drift: absent    Strength   Right neck flexion: 5/5  Left neck flexion: 5/5  Right neck extension: 5/5  Left neck extension: 5/5  Right deltoid: 5/5  Left deltoid: 5/5  Right biceps: 5/5  Left biceps: 5/5  Right triceps: 5/5  Left triceps: 5/5  Right wrist flexion: 5/5  Left wrist flexion: 5/5  Right wrist extension: 5/5  Left wrist extension: 5/5  Right interossei: 5/5  Left interossei: 5/5  Right abdominals: 5/5  Left abdominals: 5/5  Right iliopsoas: 5/5  Left iliopsoas: 5/5  Right quadriceps: 5/5  Left quadriceps: 5/5  Right hamstrin/5  Left hamstrin/5  Right glutei: 5/5  Left glutei: 5/5  Right anterior tibial: 5/5  Left anterior tibial: 5/5  Right posterior tibial: 5/5  Left posterior tibial: 5/5  Right peroneal: 5/5  Left peroneal:  5/5  Right gastroc: 5/5  Left gastroc: 5/5    Sensory Exam   Right arm light touch: normal  Left arm light touch: normal  Right leg light touch: normal  Left leg light touch: normal  Right arm vibration: normal  Left arm vibration: normal  Right arm pinprick: normal  Left arm pinprick: normal    Gait, Coordination, and Reflexes     Gait  Gait: normal    Coordination   Romberg: negative  Finger to nose coordination: normal  Heel to shin coordination: normal  Tandem walking coordination: normal    Tremor   Resting tremor: absent  Intention tremor: absent  Action tremor: absent    Reflexes   Right brachioradialis: 2+  Left brachioradialis: 2+  Right biceps: 2+  Left biceps: 2+  Right triceps: 2+  Left triceps: 2+  Right patellar: 2+  Left patellar: 2+  Right achilles: 2+  Left achilles: 2+  Right Smith: absent  Left Smith: absent  Right ankle clonus: absent  Left ankle clonus: absent      Provider dictation:  69 year old female former smoker with diabetes, osteoporosis and prior cervical spine surgery presents to discuss back pain.  She developed lower back pain 5-17-20, but she has been able to move and get around.  She cannot recall any injury or trauma, but had been doing some cleaning/ dusting.  This morning she awoke with pain so intense, it made it difficult for her walk, twist and move.  Pain described as ice picking and jabbing felt along the midline of the lower back and across the waist greatest on the left side with radiation into the left buttock area.  She denies any focal radicular leg pain, numbness, or tingling.  Pain has improved some since this morning, but still intense for her.  She has been using tylenol for pain.  Tried norco and mobic.  She has not had PT or WINIFRED.    Oswestry score: 66%.  PHQ:  3.    On exam, she is neurologically intact with 5/5 stregnth, 2+ DTR and no sensory deficits in the upper/ lower extremities.  Gait and station fluid.  Full range of motion of the upper and lower  extremities.  Denies bowel/ bladder dysfunction. No tenderness to palpation along the axis of the spine.    Xray lumbar spine from 3-8-17 reviewed.  There is Grade 1 anterolisthesis of L5 on S1.  Vertebral bodies have good height and disk spaces are well maintained.  There is facet hypertrophy at L4/5 and L5/S1.    In conclusion, Ms. Carlos has acute onset bilateral lower back pain without any radiculoapthy.  She is known to have history of lumbar spondylosis in the lower lumbar spine.  Pain can be myofacial or associated with progressive degenerative changes.  In light of such severe pain and her history of osteoporosis, we will obtain xrays to rule out fracture, which there is a low suspicion for.  We will call with results.  I am prescribing zanaflex to help with muscle spasms.  She has mobic at home and she can use this for inflammtion - take as originally prescribed.  Follow up in clinic as needed if pain does not improve.      Visit Diagnosis:  Acute midline low back pain without sciatica  -     tiZANidine (ZANAFLEX) 4 MG tablet; Take 1 tablet (4 mg total) by mouth every 12 (twelve) hours as needed (muscle spasms/ pain).  Dispense: 40 tablet; Refill: 0  -     X-Ray Lumbar Complete With Flex And Ext; Future; Expected date: 05/21/2020    Low back pain, non-specific        Total time spent counseling greater than fifty percent of total visit time.  Counseling included discussion regarding imaging findings, diagnosis possibilities, treatment options, risks and benefits.   The patient had many questions regarding the options and long-term effects.

## 2020-05-22 ENCOUNTER — TELEPHONE (OUTPATIENT)
Dept: SPINE | Facility: CLINIC | Age: 70
End: 2020-05-22

## 2020-05-22 NOTE — TELEPHONE ENCOUNTER
----- Message from Mera Gutierrez sent at 5/22/2020 12:42 PM CDT -----  Contact: Pt Results  Type: Needs Medical Advice  Who Called:  Pt  Best Call Back Number: [phone]  Additional Information: Patient is requesting a call back inregards to results

## 2020-06-29 ENCOUNTER — HOSPITAL ENCOUNTER (OUTPATIENT)
Dept: RADIOLOGY | Facility: HOSPITAL | Age: 70
Discharge: HOME | End: 2020-06-29
Attending: INTERNAL MEDICINE
Payer: MEDICARE

## 2020-06-29 DIAGNOSIS — E04.2 MULTIPLE THYROID NODULES: ICD-10-CM

## 2020-06-29 PROCEDURE — 76536 US EXAM OF HEAD AND NECK: CPT | Mod: 26,HCNC,, | Performed by: RADIOLOGY

## 2020-06-29 PROCEDURE — 76536 US EXAM OF HEAD AND NECK: CPT | Mod: TC,HCNC,PO

## 2020-06-29 PROCEDURE — 76536 US SOFT TISSUE HEAD NECK THYROID: ICD-10-PCS | Mod: 26,HCNC,, | Performed by: RADIOLOGY

## 2020-07-06 ENCOUNTER — OFFICE VISIT (OUTPATIENT)
Dept: FAMILY MEDICINE | Facility: CLINIC | Age: 70
End: 2020-07-06
Payer: MEDICARE

## 2020-07-06 ENCOUNTER — TELEPHONE (OUTPATIENT)
Dept: FAMILY MEDICINE | Facility: CLINIC | Age: 70
End: 2020-07-06

## 2020-07-06 VITALS
OXYGEN SATURATION: 97 % | DIASTOLIC BLOOD PRESSURE: 70 MMHG | BODY MASS INDEX: 34.43 KG/M2 | HEART RATE: 88 BPM | HEIGHT: 60 IN | TEMPERATURE: 98 F | SYSTOLIC BLOOD PRESSURE: 132 MMHG | WEIGHT: 175.38 LBS

## 2020-07-06 DIAGNOSIS — E66.09 CLASS 1 OBESITY DUE TO EXCESS CALORIES WITH SERIOUS COMORBIDITY AND BODY MASS INDEX (BMI) OF 34.0 TO 34.9 IN ADULT: ICD-10-CM

## 2020-07-06 DIAGNOSIS — E78.49 OTHER HYPERLIPIDEMIA: Primary | ICD-10-CM

## 2020-07-06 DIAGNOSIS — L25.9 CONTACT DERMATITIS, UNSPECIFIED CONTACT DERMATITIS TYPE, UNSPECIFIED TRIGGER: ICD-10-CM

## 2020-07-06 DIAGNOSIS — R73.9 HYPERGLYCEMIA: ICD-10-CM

## 2020-07-06 DIAGNOSIS — E03.9 HYPOTHYROIDISM, UNSPECIFIED TYPE: ICD-10-CM

## 2020-07-06 DIAGNOSIS — M79.10 MYALGIA: ICD-10-CM

## 2020-07-06 DIAGNOSIS — E11.9 TYPE 2 DIABETES MELLITUS WITHOUT COMPLICATION, WITHOUT LONG-TERM CURRENT USE OF INSULIN: ICD-10-CM

## 2020-07-06 DIAGNOSIS — J45.20 MILD INTERMITTENT ASTHMA WITHOUT COMPLICATION: ICD-10-CM

## 2020-07-06 DIAGNOSIS — R31.0 MACROSCOPIC HEMATURIA: ICD-10-CM

## 2020-07-06 DIAGNOSIS — E04.2 MULTIPLE THYROID NODULES: ICD-10-CM

## 2020-07-06 PROCEDURE — 99499 RISK ADDL DX/OHS AUDIT: ICD-10-PCS | Mod: S$GLB,,, | Performed by: INTERNAL MEDICINE

## 2020-07-06 PROCEDURE — 3051F HG A1C>EQUAL 7.0%<8.0%: CPT | Mod: HCNC,CPTII,S$GLB, | Performed by: INTERNAL MEDICINE

## 2020-07-06 PROCEDURE — 99214 OFFICE O/P EST MOD 30 MIN: CPT | Mod: HCNC,S$GLB,, | Performed by: INTERNAL MEDICINE

## 2020-07-06 PROCEDURE — 99499 UNLISTED E&M SERVICE: CPT | Mod: S$GLB,,, | Performed by: INTERNAL MEDICINE

## 2020-07-06 PROCEDURE — 99999 PR PBB SHADOW E&M-EST. PATIENT-LVL V: ICD-10-PCS | Mod: PBBFAC,HCNC,, | Performed by: INTERNAL MEDICINE

## 2020-07-06 PROCEDURE — 3008F BODY MASS INDEX DOCD: CPT | Mod: HCNC,CPTII,S$GLB, | Performed by: INTERNAL MEDICINE

## 2020-07-06 PROCEDURE — 3008F PR BODY MASS INDEX (BMI) DOCUMENTED: ICD-10-PCS | Mod: HCNC,CPTII,S$GLB, | Performed by: INTERNAL MEDICINE

## 2020-07-06 PROCEDURE — 3051F PR MOST RECENT HEMOGLOBIN A1C LEVEL 7.0 - < 8.0%: ICD-10-PCS | Mod: HCNC,CPTII,S$GLB, | Performed by: INTERNAL MEDICINE

## 2020-07-06 PROCEDURE — 1159F MED LIST DOCD IN RCRD: CPT | Mod: HCNC,S$GLB,, | Performed by: INTERNAL MEDICINE

## 2020-07-06 PROCEDURE — 1101F PR PT FALLS ASSESS DOC 0-1 FALLS W/OUT INJ PAST YR: ICD-10-PCS | Mod: HCNC,CPTII,S$GLB, | Performed by: INTERNAL MEDICINE

## 2020-07-06 PROCEDURE — 1159F PR MEDICATION LIST DOCUMENTED IN MEDICAL RECORD: ICD-10-PCS | Mod: HCNC,S$GLB,, | Performed by: INTERNAL MEDICINE

## 2020-07-06 PROCEDURE — 99214 PR OFFICE/OUTPT VISIT, EST, LEVL IV, 30-39 MIN: ICD-10-PCS | Mod: HCNC,S$GLB,, | Performed by: INTERNAL MEDICINE

## 2020-07-06 PROCEDURE — 1101F PT FALLS ASSESS-DOCD LE1/YR: CPT | Mod: HCNC,CPTII,S$GLB, | Performed by: INTERNAL MEDICINE

## 2020-07-06 PROCEDURE — 99999 PR PBB SHADOW E&M-EST. PATIENT-LVL V: CPT | Mod: PBBFAC,HCNC,, | Performed by: INTERNAL MEDICINE

## 2020-07-06 RX ORDER — METFORMIN HYDROCHLORIDE 500 MG/1
500 TABLET, FILM COATED, EXTENDED RELEASE ORAL 2 TIMES DAILY WITH MEALS
Qty: 60 TABLET | Refills: 2 | Status: SHIPPED | OUTPATIENT
Start: 2020-07-06 | End: 2020-08-26

## 2020-07-06 NOTE — PATIENT INSTRUCTIONS
Other hyperlipidemia: Maintain low fat high fiber diet, exercise regularly. Weight reduction where indicated. Increase rosuvastatin from 3 to 5x a day.   -     Lipid Panel; Future; Expected date: 07/06/2020    Type 2 diabetes mellitus without complication, without long-term current use of insulin: Maintain a low carb diet; monitor CBG's at home; keep a log for review.  -     metFORMIN (GLUMETZA) 500 MG (MOD) 24hr tablet; Take 1 tablet (500 mg total) by mouth 2 (two) times daily with meals. Short acting led to diarrhea; then cleared; wanting to try 24 hr tab. Generic.  Dispense: 60 tablet; Refill: 2  -     Hemoglobin A1C; Future; Expected date: 07/06/2020  -     Basic metabolic panel; Future; Expected date: 07/06/2020    Hyperglycemia  -     metFORMIN (GLUMETZA) 500 MG (MOD) 24hr tablet; Take 1 tablet (500 mg total) by mouth 2 (two) times daily with meals. Short acting led to diarrhea; then cleared; wanting to try 24 hr tab. Generic.  Dispense: 60 tablet; Refill: 2  -     Basic metabolic panel; Future; Expected date: 07/06/2020    Hypothyroidism, unspecified type: cont to follow TFT's conservatively.     Multiple thyroid nodules: no change in f/u thyroid US; will consult Dr Light to follow pt as well. Has thai thyroid LN's but noted nl in size as well.     Mild intermittent asthma without complicatio: increase advair to 2x a day; use rescue inhaler as needed for wheezing or shortness of breath    Contact dermatitis, unspecified contact dermatitis type, unspecified trigger: cont steroid topical to face as directed; remove new makeup from use on face as well. Trial adding claritin 10 mg a day for rash. F/u w Dr Villagran as directed dermatology.     Class 1 obesity due to excess calories with serious comorbidity and body mass index (BMI) of 34.0 to 34.9 in adult. Caloric restriction w regular exercise and weight reduction.    Macroscopic hematuria  -     Ambulatory referral/consult to Urology; Future; Expected date:  08/06/2020 Dr Blankenship.   -     Urinalysis Microscopic; Future; Expected date: 07/06/2020

## 2020-07-06 NOTE — TELEPHONE ENCOUNTER
Please send recent copy of patient's labs to her cardiologist Dr. Pantoja; thank you.  Please also add a CPK to her scheduled labs on follow-up; it has been ordered

## 2020-07-06 NOTE — PROGRESS NOTES
Subjective:       Patient ID: Breanna Silva is a 69 y.o. female.    Chief Complaint:  Patient here today for follow-up and go over labs.    HPI  Patient here today for reassessment and go over her labs.     Other hyperlipidemia:  On low-fat high-fiber diet or at least tries; tolerates rosuvastatin fine; taking 3 times a week; to help lipid profile with increased to 5 times week and take Qunol 100 mg daily; myalgias a essentially cleared with dropping her rosuvastatin to 3 times a week.     Diabetes mellitus type 2 without complication:  Diet controlled til recently w contact dermatitis felt from her mask with her needing prednisone 3 days at 20 mg, 10 mg, 10 mg then stopped. Less then 1 week ago; now BS back to 130's, w 119 reading as well. Avr 119-120's before this. Has also added new makeup liquid that she's been using to her cheeks which she needs to stop as well as this may be the source of her contact dermatitis as well. Continue steroid topical as needed and directed.  Keep her follow-up with dermatology; would also add Claritin 10 mg per day as needed.     Hypothyroidism:  Off levothyroxine following thyroid function test. TSH still min elevated at 4.38 w nl free T3 and free T4. Asymptomatic.  History multiple thyroid nodules: repeat thyroid US discussed w pt; no detrimental change noted; would obtain endocrine consult for following; repeat US in 6-12 mos.  Bilateral thyroid lymph nodes noted reportedly at normal size.  Will consult endocrinology Dr. Light for further input and following.     Mild intermittent asthma without complications:not needing to use her rescue but needs to use it more when she is wheezing; on advair daily; recommend 2x a day; occ wheezing but not using her rescue.       Macroscopic hematuria:  Minimum persistence on dipstick; no gross hematuria noted; patient asymptomatic with no history of kidney stones.     Obesity:  With the importance of regular exercise and caloric restriction  help her weight come down.  Body mass index 34.25 has lost about half a lb since 05/21/2020     Total time: 801-900 am. >50% timespent on discussion, counseling, and review.  Consultations placed to endocrine service Dr. Light and to urology Dr. Blankenship for asymptomatic macroscopic hematuria.  Patient with no history of kidney stones or gross blood noted in her urine.  Has been asymptomatic    Review of Systems   Constitutional: Negative for appetite change and fever.   HENT: Negative for congestion, postnasal drip, rhinorrhea and sinus pressure.    Eyes: Negative for discharge and itching.   Respiratory: Positive for wheezing. Negative for cough, chest tightness and shortness of breath.         Occ wheezing heard from time to time.    Cardiovascular: Negative for chest pain, palpitations and leg swelling.   Gastrointestinal: Negative for abdominal distention, abdominal pain, blood in stool, constipation, diarrhea, nausea and vomiting.   Endocrine: Negative for polydipsia, polyphagia and polyuria.   Genitourinary: Negative for dysuria and hematuria.   Musculoskeletal: Negative for arthralgias and myalgias.   Skin: Positive for rash.        Facial pruritic from mask.    Allergic/Immunologic: Negative for environmental allergies and food allergies.   Neurological: Negative for tremors, seizures and syncope.   Hematological: Negative for adenopathy. Does not bruise/bleed easily.       Objective:      Vitals:    07/06/20 0754   BP: 132/70   BP Location: Left arm   Patient Position: Sitting   BP Method: Large (Manual)   Pulse: 88   Temp: 98.4 °F (36.9 °C)   TempSrc: Oral   SpO2: 97%   Weight: 79.5 kg (175 lb 6 oz)   Height: 5' (1.524 m)     Body mass index is 34.25 kg/m².  Wt Readings from Last 3 Encounters:   07/06/20 79.5 kg (175 lb 6 oz)   05/21/20 79.9 kg (176 lb 0.6 oz)   01/21/20 79.5 kg (175 lb 2.5 oz)        Physical Exam  Vitals signs reviewed.   Constitutional:       Appearance: She is well-developed.   HENT:       Head: Normocephalic and atraumatic.      Nose: Nose normal.      Mouth/Throat:      Mouth: Mucous membranes are moist.      Pharynx: No oropharyngeal exudate or posterior oropharyngeal erythema.   Eyes:      Extraocular Movements: Extraocular movements intact.   Neck:      Musculoskeletal: Normal range of motion and neck supple.      Thyroid: No thyromegaly.      Vascular: No carotid bruit.   Cardiovascular:      Rate and Rhythm: Normal rate and regular rhythm.      Heart sounds: Normal heart sounds. No murmur. No gallop.    Pulmonary:      Effort: Pulmonary effort is normal. No respiratory distress.      Breath sounds: Wheezing present. No rales.      Comments: Min expiratory wheeze heard. Good BS's.   Abdominal:      General: Bowel sounds are normal. There is no distension.      Palpations: Abdomen is soft.      Tenderness: There is no abdominal tenderness. There is no guarding or rebound.   Musculoskeletal: Normal range of motion.   Lymphadenopathy:      Cervical: No cervical adenopathy.   Skin:     Findings: Rash present.      Comments: Pruritic erythematous rash to her cheeks w min periorbital swelling as well.    Neurological:      Mental Status: She is alert and oriented to person, place, and time.      Comments: Moves all 4 extremities fine.   Psychiatric:         Behavior: Behavior normal.         Thought Content: Thought content normal.         Assessment:       1. Other hyperlipidemia    2. Myalgia    3. Type 2 diabetes mellitus without complication, without long-term current use of insulin    4. Hyperglycemia    5. Hypothyroidism, unspecified type    6. Multiple thyroid nodules    7. Mild intermittent asthma without complication    8. Contact dermatitis, unspecified contact dermatitis type, unspecified trigger    9. Class 1 obesity due to excess calories with serious comorbidity and body mass index (BMI) of 34.0 to 34.9 in adult    10. Macroscopic hematuria        Plan:       Other hyperlipidemia:  Maintain low fat high fiber diet, exercise regularly. Weight reduction where indicated. Increase rosuvastatin from 3 to 5x a week.  Qunol 100 mg daily  -     Lipid Panel; Future; Expected date: 07/06/2020    Myalgia:  Essentially cleared with dropping her rosuvastatin to 3 times a week; Qunol 100 mg daily; increase rosuvastatin to 5 times a week; will check CPK on follow-up; to call if worsens    Type 2 diabetes mellitus without complication, without long-term current use of insulin: Maintain a low carb diet; monitor CBG's at home; keep a log for review.  Had diarrhea once to with short-acting metformin so will try a time release preparation.   -     metFORMIN (GLUMETZA) 500 MG (MOD) 24hr tablet; Take 1 tablet (500 mg total) by mouth 2 (two) times daily with meals. Short acting led to diarrhea; then cleared; wanting to try 24 hr tab. Generic.  Dispense: 60 tablet; Refill: 2  -     Hemoglobin A1C; Future; Expected date: 07/06/2020  -     Basic metabolic panel; Future; Expected date: 07/06/2020    Hyperglycemia:  Adhere to diabetic diet closer and include exercise and weight reduction attempts.  -     metFORMIN (GLUMETZA) 500 MG (MOD) 24hr tablet; Take 1 tablet (500 mg total) by mouth 2 (two) times daily with meals. Short acting led to diarrhea; then cleared; wanting to try 24 hr tab. Generic.  Dispense: 60 tablet; Refill: 2  -     Basic metabolic panel; Future; Expected date: 07/06/2020    Hypothyroidism, unspecified type: cont to follow TFT's conservatively.  Not on levothyroxine; TSH minimally elevated with normal free T3 and normal free T4.  Patient asymptomatic.    Multiple thyroid nodules: no detrimental change in f/u thyroid US; will consult Dr Light to follow pt as well. Has thai thyroid LN's but noted nl in size as well.     Mild intermittent asthma without complicatio: increase advair to 2x a day; use rescue inhaler as needed for wheezing or shortness of breath; continue steroid topical application to her  face as needed.     Contact dermatitis, unspecified contact dermatitis type, unspecified trigger: cont steroid topical to face as directed; remove new makeup from use on face as well. Trial adding claritin 10 mg a day for rash. F/u w Dr Villagran as directed dermatology.     Class 1 obesity due to excess calories with serious comorbidity and body mass index (BMI) of 34.0 to 34.9 in adult. Caloric restriction w regular exercise and weight reduction.    Macroscopic hematuria:  Asymptomatic with no gross hematuria and no prior history of kidney stones  -     Ambulatory referral/consult to Urology; Future; Expected date: 08/06/2020 Dr Blankenship.   -     Urinalysis Microscopic; Future; Expected date: 07/06/2020

## 2020-07-08 NOTE — TELEPHONE ENCOUNTER
Requesting steroid.  She saw yesterday.  Has mild intermittent asthma  LOV:   Mild intermittent asthma without complications:not needing to use her rescue but needs to use it more when she is wheezing; on advair daily; recommend 2x a day; occ wheezing but not using her rescue.

## 2020-07-12 RX ORDER — PREDNISONE 10 MG/1
TABLET ORAL
Qty: 22 TABLET | Refills: 0 | OUTPATIENT
Start: 2020-07-12

## 2020-07-13 DIAGNOSIS — E11.9 TYPE 2 DIABETES MELLITUS WITHOUT COMPLICATION, WITHOUT LONG-TERM CURRENT USE OF INSULIN: ICD-10-CM

## 2020-07-16 RX ORDER — PREDNISONE 10 MG/1
TABLET ORAL
Qty: 22 TABLET | Refills: 2 | OUTPATIENT
Start: 2020-07-16

## 2020-07-22 ENCOUNTER — OFFICE VISIT (OUTPATIENT)
Dept: UROLOGY | Facility: CLINIC | Age: 70
End: 2020-07-22
Payer: MEDICARE

## 2020-07-22 VITALS
SYSTOLIC BLOOD PRESSURE: 126 MMHG | HEIGHT: 60 IN | DIASTOLIC BLOOD PRESSURE: 73 MMHG | HEART RATE: 74 BPM | WEIGHT: 175.25 LBS | BODY MASS INDEX: 34.41 KG/M2

## 2020-07-22 DIAGNOSIS — R31.29 MICROSCOPIC HEMATURIA: ICD-10-CM

## 2020-07-22 PROCEDURE — 99203 OFFICE O/P NEW LOW 30 MIN: CPT | Mod: S$GLB,,, | Performed by: UROLOGY

## 2020-07-22 PROCEDURE — 3008F BODY MASS INDEX DOCD: CPT | Mod: CPTII,S$GLB,, | Performed by: UROLOGY

## 2020-07-22 PROCEDURE — 1101F PR PT FALLS ASSESS DOC 0-1 FALLS W/OUT INJ PAST YR: ICD-10-PCS | Mod: CPTII,S$GLB,, | Performed by: UROLOGY

## 2020-07-22 PROCEDURE — 99203 PR OFFICE/OUTPT VISIT, NEW, LEVL III, 30-44 MIN: ICD-10-PCS | Mod: S$GLB,,, | Performed by: UROLOGY

## 2020-07-22 PROCEDURE — 1101F PT FALLS ASSESS-DOCD LE1/YR: CPT | Mod: CPTII,S$GLB,, | Performed by: UROLOGY

## 2020-07-22 PROCEDURE — 3008F PR BODY MASS INDEX (BMI) DOCUMENTED: ICD-10-PCS | Mod: CPTII,S$GLB,, | Performed by: UROLOGY

## 2020-07-22 PROCEDURE — 1126F AMNT PAIN NOTED NONE PRSNT: CPT | Mod: S$GLB,,, | Performed by: UROLOGY

## 2020-07-22 PROCEDURE — 1126F PR PAIN SEVERITY QUANTIFIED, NO PAIN PRESENT: ICD-10-PCS | Mod: S$GLB,,, | Performed by: UROLOGY

## 2020-07-22 PROCEDURE — 99999 PR PBB SHADOW E&M-EST. PATIENT-LVL V: CPT | Mod: PBBFAC,HCNC,, | Performed by: UROLOGY

## 2020-07-22 PROCEDURE — 99999 PR PBB SHADOW E&M-EST. PATIENT-LVL V: ICD-10-PCS | Mod: PBBFAC,HCNC,, | Performed by: UROLOGY

## 2020-07-22 PROCEDURE — 1159F MED LIST DOCD IN RCRD: CPT | Mod: S$GLB,,, | Performed by: UROLOGY

## 2020-07-22 PROCEDURE — 1159F PR MEDICATION LIST DOCUMENTED IN MEDICAL RECORD: ICD-10-PCS | Mod: S$GLB,,, | Performed by: UROLOGY

## 2020-07-22 RX ORDER — DESONIDE 0.5 MG/G
CREAM TOPICAL
COMMUNITY
Start: 2020-06-19

## 2020-07-22 NOTE — LETTER
July 22, 2020      Nguyễn Nicholas MD  [address]  [place] [address]           [facility]  [address] [place] [address]  Phone: [phone]  Fax: [phone]          Patient: Breanna Silva   MR Number: [id number]   YOB: 1950   Date of Visit: 7/22/2020       Dear Dr. Nguyễn Nicholas:    Thank you for referring Breanna Silva to me for evaluation. Attached you will find relevant portions of my assessment and plan of care.    If you have questions, please do not hesitate to call me. I look forward to following Breanna Silva along with you.    Sincerely,    SHANNA Blankenship MD    Enclosure  CC:  No Recipients    If you would like to receive this communication electronically, please contact [email or web address] or [phone] to request more information on [employer] Link access.    For providers and/or their staff who would like to refer a patient to [facility], please contact us through our one-stop-shop provider referral line, [facility], at [phone].    If you feel you have received this communication in error or would no longer like to receive these types of communications, please e-mail [email or web address]

## 2020-07-22 NOTE — PROGRESS NOTES
Subjective:       Patient ID: Breanna Silva is a 69 y.o. female.    Chief Complaint: Hematuria (micro)    HPI     69-year-old with microscopic hematuria on several occasions.  She has no other symptoms.  She denies dysuria frequency and urgency.  She denies flank pain and she has no history of stones.  She does have a distant history of smoking.  She says she quit smoking about 20 years ago.      Past Medical History:   Diagnosis Date    Age-related osteoporosis without current pathological fracture     Asthma     Diabetes mellitus, type 2     w wt loss off metformin now; diet controlled.    Hypothyroidism     Mild intermittent asthma without complication     on Xolair inj monthly; allergist Dr Nelson.    Osteopenia     Osteopenia     Pre-hypertension     suspected white coat syndrome; also elevated w seeing ortho x2 for pain.    Proteinuria     Pure hypercholesterolemia     Thyroid nodule     Vitamin D insufficiency      Past Surgical History:   Procedure Laterality Date     SECTION      x3    COLONOSCOPY      Dr Butler: 3-4 polyps removed; five-year follow-up recommended    NECK SURGERY  1989    fusion C5-6; 2 discs removed as well.    TOTAL HIP ARTHROPLASTY Right     ; avascular necrosis.    TUBAL LIGATION         Current Outpatient Medications:     albuterol (VENTOLIN HFA) 90 mcg/actuation inhaler, Inhale 2 puffs into the lungs every 6 (six) hours as needed for Wheezing. Rescue, Disp: , Rfl:     alendronate (FOSAMAX) 35 MG tablet, Take 1 tablet (35 mg total) by mouth every 7 days., Disp: 15 tablet, Rfl: 1    amoxicillin (AMOXIL) 500 MG capsule, Take 500 mg by mouth 2 (two) times daily., Disp: , Rfl:     biotin 1 mg tablet, Take 1,000 mcg by mouth once daily., Disp: , Rfl:     blood sugar diagnostic (TRUE METRIX GLUCOSE TEST STRIP) Strp, Check blood sugar levels 1-2 times daily, Disp: 180 strip, Rfl: 1    CALCIUM CARBONATE/VITAMIN D3 (CALCIUM 600 + D,3, ORAL),  Take 600 mg by mouth 2 (two) times daily., Disp: , Rfl:     coenzyme Q10 200 mg capsule, Take 200 mg by mouth once daily., Disp: , Rfl:     desonide (DESOWEN) 0.05 % cream, APPLY TO AFFECTED AREA ON FACE TWICE DAILY AS NEEDED FOR 2 WEEKS ONLY, Disp: , Rfl:     fluticasone-salmeterol 230-21 mcg/dose (ADVAIR HFA) 230-21 mcg/actuation HFAA inhaler, Inhale 2 puffs into the lungs 2 (two) times daily. Controller, Disp: 12 g, Rfl: 2    LUTEIN ORAL, Take by mouth once daily at 6am., Disp: , Rfl:     meloxicam (MOBIC) 15 MG tablet, Take 1 tablet (15 mg total) by mouth once daily., Disp: 30 tablet, Rfl: 4    metFORMIN (GLUMETZA) 500 MG (MOD) 24hr tablet, Take 1 tablet (500 mg total) by mouth 2 (two) times daily with meals. Short acting led to diarrhea; then cleared; wanting to try 24 hr tab. Generic., Disp: 60 tablet, Rfl: 2    multivitamin (ONE DAILY MULTIVITAMIN) per tablet, Take 1 tablet by mouth once daily., Disp: , Rfl:     rosuvastatin (CRESTOR) 10 MG tablet, TAKE 1 TABLET BY MOUTH IN THE EVENING, Disp: 90 tablet, Rfl: 1    tiZANidine (ZANAFLEX) 4 MG tablet, Take 1 tablet (4 mg total) by mouth every 12 (twelve) hours as needed (muscle spasms/ pain)., Disp: 40 tablet, Rfl: 0    torsemide (DEMADEX) 20 MG Tab, 20 mg po daily as needed for fluid, Disp: 30 tablet, Rfl: 1    TRUE METRIX AIR GLUCOSE METER Misc, USE AS DIRECTED, Disp: 1 each, Rfl: 0    UNABLE TO FIND, AG Pro - 2 pills twice a day, Disp: , Rfl:     hydrocodone-homatropine 5-1.5 mg/5 ml (HYCODAN) 5-1.5 mg/5 mL Syrp, Take 5 mLs by mouth nightly as needed. (Patient not taking: Reported on 1/21/2020), Disp: 120 mL, Rfl: 0    predniSONE (DELTASONE) 10 MG tablet, 3 tablets by mouth for 3 days then 2 tablets for 4 days then 1 tablet for 5 days. (Patient not taking: Reported on 5/21/2020), Disp: 22 tablet, Rfl: 2      Review of Systems   Constitutional: Negative for chills.   Eyes: Negative for visual disturbance.   Respiratory: Negative for shortness of  breath.    Cardiovascular: Negative for chest pain.   Gastrointestinal: Negative for abdominal pain and nausea.   Genitourinary: Negative for dysuria, flank pain and hematuria.   Musculoskeletal: Negative for gait problem.   Skin: Negative for rash.   Neurological: Negative for seizures.   Psychiatric/Behavioral: Negative for confusion.       Objective:      Physical Exam  Vitals signs reviewed.   Constitutional:       Appearance: She is well-developed.   HENT:      Head: Normocephalic.   Eyes:      Conjunctiva/sclera: Conjunctivae normal.   Neck:      Musculoskeletal: Normal range of motion.   Cardiovascular:      Rate and Rhythm: Normal rate.   Pulmonary:      Effort: Pulmonary effort is normal.   Abdominal:      Palpations: There is no mass.      Tenderness: There is no right CVA tenderness or left CVA tenderness.   Genitourinary:     Comments: Bladder non-tender and nondistended  No CVA tenderness  Skin:     General: Skin is warm and dry.      Findings: No erythema or rash.   Neurological:      Mental Status: She is alert and oriented to person, place, and time.   Psychiatric:         Behavior: Behavior normal.         Assessment:       1. Microscopic hematuria        Plan:       Microscopic hematuria  -     Ambulatory referral/consult to Urology  -     US Retroperitoneal Complete (Kidney and; Future; Expected date: 07/22/2020  -     Cystoscopy; Future

## 2020-07-28 ENCOUNTER — HOSPITAL ENCOUNTER (OUTPATIENT)
Dept: RADIOLOGY | Facility: HOSPITAL | Age: 70
Discharge: HOME | End: 2020-07-28
Attending: UROLOGY
Payer: MEDICARE

## 2020-07-28 ENCOUNTER — TELEPHONE (OUTPATIENT)
Dept: UROLOGY | Facility: CLINIC | Age: 70
End: 2020-07-28

## 2020-07-28 DIAGNOSIS — R31.29 MICROSCOPIC HEMATURIA: ICD-10-CM

## 2020-07-28 DIAGNOSIS — R31.29 MICROSCOPIC HEMATURIA: Primary | ICD-10-CM

## 2020-07-28 PROCEDURE — 76770 US EXAM ABDO BACK WALL COMP: CPT | Mod: 26,HCNC,, | Performed by: RADIOLOGY

## 2020-07-28 PROCEDURE — 76770 US RETROPERITONEAL COMPLETE: ICD-10-PCS | Mod: 26,HCNC,, | Performed by: RADIOLOGY

## 2020-07-28 PROCEDURE — 76770 US EXAM ABDO BACK WALL COMP: CPT | Mod: TC,HCNC,PO

## 2020-07-28 NOTE — TELEPHONE ENCOUNTER
Patient left urine at lab, tried to call to find out why, no answer. Entered orders for UA reflexive to UC just in case.

## 2020-07-29 ENCOUNTER — TELEPHONE (OUTPATIENT)
Dept: FAMILY MEDICINE | Facility: CLINIC | Age: 70
End: 2020-07-29

## 2020-07-29 NOTE — TELEPHONE ENCOUNTER
----- Message from Kavon Person sent at 7/29/2020  2:59 PM CDT -----  Type:  Patient Returning Call    Who Called:  Patient  Who Left Message for Patient:  unknown  Does the patient know what this is regarding?:  unknown, no note/message  Best Call Back Number:  [phone]  Additional Information:  NA

## 2020-08-10 ENCOUNTER — TELEPHONE (OUTPATIENT)
Dept: UROLOGY | Facility: CLINIC | Age: 70
End: 2020-08-10

## 2020-08-10 NOTE — TELEPHONE ENCOUNTER
----- Message from Malik Moody sent at 8/10/2020  2:12 PM CDT -----  Regarding: advice  Contact: self  Type: Needs Medical Advice  Who Called:  self  Symptoms (please be specific):    How long has patient had these symptoms:    Pharmacy name and phone #:    Best Call Back Number: [phone]  Additional Information: Patient is schedule for cystoscope, patient want to know if she need someone to accompany the patient .

## 2020-08-10 NOTE — TELEPHONE ENCOUNTER
Spoke to pt and informed her that she won't need a  for after her cystoscopy. Pt verbalized understanding.

## 2020-08-13 ENCOUNTER — PROCEDURE VISIT (OUTPATIENT)
Dept: UROLOGY | Facility: CLINIC | Age: 70
End: 2020-08-13
Payer: MEDICARE

## 2020-08-13 VITALS
SYSTOLIC BLOOD PRESSURE: 172 MMHG | WEIGHT: 175.94 LBS | DIASTOLIC BLOOD PRESSURE: 75 MMHG | HEART RATE: 92 BPM | HEIGHT: 60 IN | BODY MASS INDEX: 34.54 KG/M2

## 2020-08-13 DIAGNOSIS — R31.29 MICROSCOPIC HEMATURIA: Primary | ICD-10-CM

## 2020-08-13 PROCEDURE — 52000 CYSTOURETHROSCOPY: CPT | Mod: HCNC,S$GLB,, | Performed by: UROLOGY

## 2020-08-13 PROCEDURE — 52000 CYSTOSCOPY: ICD-10-PCS | Mod: HCNC,S$GLB,, | Performed by: UROLOGY

## 2020-08-13 RX ORDER — METFORMIN HYDROCHLORIDE 500 MG/1
500 TABLET, EXTENDED RELEASE ORAL 2 TIMES DAILY WITH MEALS
COMMUNITY
Start: 2020-08-05 | End: 2020-08-26 | Stop reason: SDUPTHER

## 2020-08-13 NOTE — PROCEDURES
"Cystoscopy    Date/Time: 8/13/2020 1:00 PM  Performed by: SHANNA Blankenship MD  Authorized by: SHANNA Blankenship MD     Consent Done?:  Yes (Written)  Time out: Immediately prior to procedure a "time out" was called to verify the correct patient, procedure, equipment, support staff and site/side marked as required.    Indications: hematuria    Position:  Other  Patient sedated?: No    Preparation: Patient was prepped and draped in usual sterile fashion      Scope type:  Flexible cystoscope    Patient tolerance:  Patient tolerated the procedure well with no immediate complications    Blood Loss:  None    The patients clinic history and physical were reviewed and there are no changes.    The patient was placed in the frog-leg position.  The genitals were prepped and draped in a sterile fashion.   Using a flexible scope, a careful cystoscopic exam was then performed.  The entire bladder mucosa was systematically visualized.  The mucosa appeared normal.  There were no lesions, masses foreign bodies or stones.   Each ureteral orifices were visualized and both had clear efflux of urine.  The cystoscope was then removed and I examined the entire length of the urethra.  The urethra appeared normal.  She tolerated the procedure well.  There were no complications.    Impression:  Normal cystoscopy.      "

## 2020-08-17 ENCOUNTER — OFFICE VISIT (OUTPATIENT)
Dept: ENDOCRINOLOGY | Facility: CLINIC | Age: 70
End: 2020-08-17
Payer: MEDICARE

## 2020-08-17 VITALS
HEIGHT: 60 IN | HEART RATE: 95 BPM | OXYGEN SATURATION: 95 % | SYSTOLIC BLOOD PRESSURE: 130 MMHG | DIASTOLIC BLOOD PRESSURE: 72 MMHG | WEIGHT: 176 LBS | BODY MASS INDEX: 34.55 KG/M2

## 2020-08-17 DIAGNOSIS — E04.2 MULTIPLE THYROID NODULES: ICD-10-CM

## 2020-08-17 DIAGNOSIS — R94.6 ABNORMAL THYROID FUNCTION TEST: Primary | ICD-10-CM

## 2020-08-17 PROCEDURE — 1126F PR PAIN SEVERITY QUANTIFIED, NO PAIN PRESENT: ICD-10-PCS | Mod: HCNC,S$GLB,, | Performed by: INTERNAL MEDICINE

## 2020-08-17 PROCEDURE — 99204 PR OFFICE/OUTPT VISIT, NEW, LEVL IV, 45-59 MIN: ICD-10-PCS | Mod: HCNC,S$GLB,, | Performed by: INTERNAL MEDICINE

## 2020-08-17 PROCEDURE — 3008F BODY MASS INDEX DOCD: CPT | Mod: HCNC,CPTII,S$GLB, | Performed by: INTERNAL MEDICINE

## 2020-08-17 PROCEDURE — 1101F PT FALLS ASSESS-DOCD LE1/YR: CPT | Mod: HCNC,CPTII,S$GLB, | Performed by: INTERNAL MEDICINE

## 2020-08-17 PROCEDURE — 1126F AMNT PAIN NOTED NONE PRSNT: CPT | Mod: HCNC,S$GLB,, | Performed by: INTERNAL MEDICINE

## 2020-08-17 PROCEDURE — 1101F PR PT FALLS ASSESS DOC 0-1 FALLS W/OUT INJ PAST YR: ICD-10-PCS | Mod: HCNC,CPTII,S$GLB, | Performed by: INTERNAL MEDICINE

## 2020-08-17 PROCEDURE — 1159F MED LIST DOCD IN RCRD: CPT | Mod: HCNC,S$GLB,, | Performed by: INTERNAL MEDICINE

## 2020-08-17 PROCEDURE — 99204 OFFICE O/P NEW MOD 45 MIN: CPT | Mod: HCNC,S$GLB,, | Performed by: INTERNAL MEDICINE

## 2020-08-17 PROCEDURE — 1159F PR MEDICATION LIST DOCUMENTED IN MEDICAL RECORD: ICD-10-PCS | Mod: HCNC,S$GLB,, | Performed by: INTERNAL MEDICINE

## 2020-08-17 PROCEDURE — 99999 PR PBB SHADOW E&M-EST. PATIENT-LVL V: CPT | Mod: PBBFAC,HCNC,, | Performed by: INTERNAL MEDICINE

## 2020-08-17 PROCEDURE — 99999 PR PBB SHADOW E&M-EST. PATIENT-LVL V: ICD-10-PCS | Mod: PBBFAC,HCNC,, | Performed by: INTERNAL MEDICINE

## 2020-08-17 PROCEDURE — 3008F PR BODY MASS INDEX (BMI) DOCUMENTED: ICD-10-PCS | Mod: HCNC,CPTII,S$GLB, | Performed by: INTERNAL MEDICINE

## 2020-08-17 NOTE — PROGRESS NOTES
CHIEF COMPLAINT: Multinodular Goiter   70 year old being seen as a new patient. Nodules have been followed > 5 years. States had been on synthroid in the past. States stopped due to hair loss several years ago. Does not recall if any allergies, infections at that the time of the Ultrasound. No XRT to head/neck. No difficulty swallowing. No change in voice. No history of FNA.       PAST MEDICAL HISTORY/PAST SURGICAL HISTORY:  Reviewed in [facility]    SOCIAL HISTORY: Reviewed in [facility]    FAMILY HISTORY:  + DM 2. No thyroid disease. No thyroid Ca.     MEDICATIONS/ALLERGIES: The patient's MedCard has been updated and reviewed.      ROS:   Constitutional: Weight stable  Eyes: No recent visual changes  ENT: No dysphagia  Cardiovascular: No CP or Palpitations  Respiratory: No SOB  Gastrointestinal: No Nausea/Vomiting, Diarrhea or constipation  GenitoUrinary - No dysuria, No polyuria  Skin: No new skin rash  Neurologic: No HA  Remainder ROS negative        PE:    GENERAL: Well developed, well nourished.  PSYCH:  appropriate mood and affect  EYES:  PERRL, EOM intact.  ENT: Nares patent, oropharynx clear, mucosa pink,   NECK: Supple, trachea midline, no palpable thyroid nodules.   CHEST: Resp even and unlabored, CTA bilateral.  CARDIAC: RRR, S1, S2 heard, no murmurs, rubs, S3, or S4  ABDOMEN: Soft, non-tender, non-distended;  No organomegaly  VASCULAR:  DP pulses +2/4 bilaterally, no edema  NEURO: Gait steady, CN II-VII grossly intact  SKIN: No areas of breakdown, no acanthosis nigracans.        LABS/Radiology  Results for ROYCE KURTZ (MRN [id number]) as of 8/17/2020 14:27   Ref. Range 1/13/2020 10:56 6/29/2020 08:04   TSH Latest Ref Range: 0.400 - 4.000 uIU/mL 3.807 4.384 (H)   T3, Free Latest Ref Range: 2.3 - 4.2 pg/mL 2.7 3.8   Free T4 Latest Ref Range: 0.71 - 1.51 ng/dL 1.01 1.06     US SOFT TISSUE HEAD NECK THYROID     CLINICAL HISTORY:  Nontoxic multinodular goiter     TECHNIQUE:  Ultrasound of the thyroid and cervical  lymph nodes was performed.     COMPARISON:  Thyroid ultrasound-07/08/2019     FINDINGS:  The right thyroid lobe measures 4 x 1.1 x 1.4 cm.  The left thyroid lobe measures 3.6 x 1.0 x 1.4 cm.  The total thyroid weight is approximately 5.9 g.  There is a heterogeneous thyroid parenchymal echotexture present.  There is a 7 x 3 x 3 mm ill-defined peripherally calcified, heterogeneous, primarily hyperechoic focus noted within the right thyroid midpole, unchanged when compared to the previous study.  There is a similar appearing 8 x 4 x 3 mm ill-defined, heterogeneous, primarily hyperechoic, peripherally calcified solid focus within the left thyroid midpole, similar to the prior exam.  No new thyroid nodules or microcalcifications warranting FNA/core biopsy as compared to the previous study.  No interval detrimental change in the imaging appearance of the thyroid gland when compared to the previous study.  There are unchanged normal-sized, morphologically normal lymph nodes observed to the left and right thyroid lobe in the left level II and right level II lymph node stations measuring 15 x 5 x 6 mm and 16 x 3 x 11 mm respectively.     Impression:     1. Small heterogeneous thyroid gland suggesting possible underlying hypothyroidism.  Please correlate clinically and with thyroid function studies.  2. Unchanged heterogeneous, primarily hyperechoic foci within the left and right thyroid lobe.  No new thyroid nodules which meet the criteria for FNA/core biopsy.  No interval detrimental change when compared to the prior exam.      ASSESSMENT/PLAN:  1. Multinodular Goiter- No XRT. No obstructive symptoms. Nodules do not meet criteria fro FNA at this time. Will monitor with Ultrasound in 1 yr.     2. Elevated TSH- Discussed that only need to treat if TSH > 10 or if elevated and becomes symptomatic. WIll continue to monitor.       FOLLOWUP  F/U 1 yr with TSH, thyroid US

## 2020-08-17 NOTE — LETTER
August 23, 2020      Nguyễn Nicholas MD  [address]  [place] [address]           [facility]  [address] [place] [address]  Phone: [phone]  Fax: [phone]          Patient: Breanna Silva   MR Number: [id number]   YOB: 1950   Date of Visit: 8/17/2020       Dear Dr. Nguyễn Nicholas:    Thank you for referring Breanna Silva to me for evaluation. Attached you will find relevant portions of my assessment and plan of care.    If you have questions, please do not hesitate to call me. I look forward to following Breanna Silva along with you.    Sincerely,    Zackery Light, DO [place]  CC:  No Recipients    If you would like to receive this communication electronically, please contact [email or web address] or [phone] to request more information on [employer] Link access.    For providers and/or their staff who would like to refer a patient to [facility], please contact us through our one-stop-shop provider referral line, [facility], at [phone].    If you feel you have received this communication in error or would no longer like to receive these types of communications, please e-mail [email or web address]

## 2020-08-25 ENCOUNTER — TELEPHONE (OUTPATIENT)
Dept: FAMILY MEDICINE | Facility: CLINIC | Age: 70
End: 2020-08-25

## 2020-08-25 DIAGNOSIS — E11.9 TYPE 2 DIABETES MELLITUS WITHOUT COMPLICATION, WITHOUT LONG-TERM CURRENT USE OF INSULIN: Primary | ICD-10-CM

## 2020-08-25 NOTE — TELEPHONE ENCOUNTER
Fax rec'd from [facility] for Metformin  mg for medication alternative, requesting 90-day supply to promote better adherence.          Please review. Thank you.

## 2020-08-26 RX ORDER — METFORMIN HYDROCHLORIDE 500 MG/1
500 TABLET, EXTENDED RELEASE ORAL 2 TIMES DAILY WITH MEALS
Qty: 180 TABLET | Refills: 1 | Status: SHIPPED | OUTPATIENT
Start: 2020-08-26 | End: 2021-03-09

## 2020-08-26 NOTE — TELEPHONE ENCOUNTER
Please notify patient that her metformin 500 extended release 24 hr tabs were sent into the pharmacist for for 90 day supply with 1 refill.  Please have her schedule an annual wellness sometime within the next several weeks

## 2020-09-11 RX ORDER — PREDNISONE 10 MG/1
TABLET ORAL
Qty: 22 TABLET | Refills: 0 | OUTPATIENT
Start: 2020-09-11

## 2020-09-28 ENCOUNTER — LAB VISIT (OUTPATIENT)
Dept: LAB | Facility: HOSPITAL | Age: 70
End: 2020-09-28
Attending: INTERNAL MEDICINE
Payer: MEDICARE

## 2020-09-28 DIAGNOSIS — E11.9 TYPE 2 DIABETES MELLITUS WITHOUT COMPLICATION, WITHOUT LONG-TERM CURRENT USE OF INSULIN: ICD-10-CM

## 2020-09-28 DIAGNOSIS — M79.10 MYALGIA: ICD-10-CM

## 2020-09-28 DIAGNOSIS — E78.49 OTHER HYPERLIPIDEMIA: ICD-10-CM

## 2020-09-28 DIAGNOSIS — R73.9 HYPERGLYCEMIA: ICD-10-CM

## 2020-09-28 LAB
ANION GAP SERPL CALC-SCNC: 9 MMOL/L (ref 8–16)
BUN SERPL-MCNC: 14 MG/DL (ref 8–23)
CALCIUM SERPL-MCNC: 8.9 MG/DL (ref 8.7–10.5)
CHLORIDE SERPL-SCNC: 104 MMOL/L (ref 95–110)
CHOLEST SERPL-MCNC: 158 MG/DL (ref 120–199)
CHOLEST/HDLC SERPL: 1.9 {RATIO} (ref 2–5)
CK SERPL-CCNC: 48 U/L (ref 20–180)
CO2 SERPL-SCNC: 28 MMOL/L (ref 23–29)
CREAT SERPL-MCNC: 0.8 MG/DL (ref 0.5–1.4)
EST. GFR  (AFRICAN AMERICAN): >60 ML/MIN/1.73 M^2
EST. GFR  (NON AFRICAN AMERICAN): >60 ML/MIN/1.73 M^2
ESTIMATED AVG GLUCOSE: 148 MG/DL (ref 68–131)
GLUCOSE SERPL-MCNC: 128 MG/DL (ref 70–110)
HBA1C MFR BLD HPLC: 6.8 % (ref 4–5.6)
HDLC SERPL-MCNC: 83 MG/DL (ref 40–75)
HDLC SERPL: 52.5 % (ref 20–50)
LDLC SERPL CALC-MCNC: 59.2 MG/DL (ref 63–159)
NONHDLC SERPL-MCNC: 75 MG/DL
POTASSIUM SERPL-SCNC: 4 MMOL/L (ref 3.5–5.1)
SODIUM SERPL-SCNC: 141 MMOL/L (ref 136–145)
TRIGL SERPL-MCNC: 79 MG/DL (ref 30–150)

## 2020-09-28 PROCEDURE — 80061 LIPID PANEL: CPT | Mod: HCNC

## 2020-09-28 PROCEDURE — 82550 ASSAY OF CK (CPK): CPT | Mod: HCNC

## 2020-09-28 PROCEDURE — 83036 HEMOGLOBIN GLYCOSYLATED A1C: CPT | Mod: HCNC

## 2020-09-28 PROCEDURE — 80048 BASIC METABOLIC PNL TOTAL CA: CPT | Mod: HCNC

## 2020-09-28 PROCEDURE — 36415 COLL VENOUS BLD VENIPUNCTURE: CPT | Mod: HCNC,PO

## 2020-10-12 ENCOUNTER — OFFICE VISIT (OUTPATIENT)
Dept: FAMILY MEDICINE | Facility: CLINIC | Age: 70
End: 2020-10-12
Payer: MEDICARE

## 2020-10-12 VITALS
SYSTOLIC BLOOD PRESSURE: 120 MMHG | BODY MASS INDEX: 33.55 KG/M2 | DIASTOLIC BLOOD PRESSURE: 64 MMHG | WEIGHT: 170.88 LBS | TEMPERATURE: 98 F | HEIGHT: 60 IN | OXYGEN SATURATION: 97 % | HEART RATE: 85 BPM

## 2020-10-12 DIAGNOSIS — M85.859 OSTEOPENIA OF NECK OF FEMUR, UNSPECIFIED LATERALITY: ICD-10-CM

## 2020-10-12 DIAGNOSIS — Z12.11 COLON CANCER SCREENING: ICD-10-CM

## 2020-10-12 DIAGNOSIS — E55.9 VITAMIN D INSUFFICIENCY: ICD-10-CM

## 2020-10-12 DIAGNOSIS — Z01.89 ENCOUNTER FOR LABORATORY TEST: ICD-10-CM

## 2020-10-12 DIAGNOSIS — Z00.00 HEALTHCARE MAINTENANCE: ICD-10-CM

## 2020-10-12 DIAGNOSIS — E11.9 TYPE 2 DIABETES MELLITUS WITHOUT COMPLICATION, WITHOUT LONG-TERM CURRENT USE OF INSULIN: ICD-10-CM

## 2020-10-12 DIAGNOSIS — Z78.0 POSTMENOPAUSAL: ICD-10-CM

## 2020-10-12 DIAGNOSIS — E04.2 MULTIPLE THYROID NODULES: ICD-10-CM

## 2020-10-12 DIAGNOSIS — E11.9 ENCOUNTER FOR DIABETIC FOOT EXAM: ICD-10-CM

## 2020-10-12 DIAGNOSIS — E78.49 OTHER HYPERLIPIDEMIA: ICD-10-CM

## 2020-10-12 DIAGNOSIS — E66.09 CLASS 1 OBESITY DUE TO EXCESS CALORIES WITH SERIOUS COMORBIDITY AND BODY MASS INDEX (BMI) OF 33.0 TO 33.9 IN ADULT: ICD-10-CM

## 2020-10-12 DIAGNOSIS — R79.89 ELEVATED TSH: ICD-10-CM

## 2020-10-12 DIAGNOSIS — R31.29 MICROSCOPIC HEMATURIA: ICD-10-CM

## 2020-10-12 DIAGNOSIS — Z91.89 AT RISK FOR SIDE EFFECT OF MEDICATION: ICD-10-CM

## 2020-10-12 DIAGNOSIS — J45.20 MILD INTERMITTENT ASTHMA WITHOUT COMPLICATION: Primary | ICD-10-CM

## 2020-10-12 PROCEDURE — 1159F PR MEDICATION LIST DOCUMENTED IN MEDICAL RECORD: ICD-10-PCS | Mod: HCNC,S$GLB,, | Performed by: INTERNAL MEDICINE

## 2020-10-12 PROCEDURE — 3008F BODY MASS INDEX DOCD: CPT | Mod: HCNC,CPTII,S$GLB, | Performed by: INTERNAL MEDICINE

## 2020-10-12 PROCEDURE — 99215 PR OFFICE/OUTPT VISIT, EST, LEVL V, 40-54 MIN: ICD-10-PCS | Mod: HCNC,S$GLB,, | Performed by: INTERNAL MEDICINE

## 2020-10-12 PROCEDURE — 99215 OFFICE O/P EST HI 40 MIN: CPT | Mod: HCNC,S$GLB,, | Performed by: INTERNAL MEDICINE

## 2020-10-12 PROCEDURE — 3044F HG A1C LEVEL LT 7.0%: CPT | Mod: HCNC,CPTII,S$GLB, | Performed by: INTERNAL MEDICINE

## 2020-10-12 PROCEDURE — 1101F PT FALLS ASSESS-DOCD LE1/YR: CPT | Mod: HCNC,CPTII,S$GLB, | Performed by: INTERNAL MEDICINE

## 2020-10-12 PROCEDURE — 1101F PR PT FALLS ASSESS DOC 0-1 FALLS W/OUT INJ PAST YR: ICD-10-PCS | Mod: HCNC,CPTII,S$GLB, | Performed by: INTERNAL MEDICINE

## 2020-10-12 PROCEDURE — 3044F PR MOST RECENT HEMOGLOBIN A1C LEVEL <7.0%: ICD-10-PCS | Mod: HCNC,CPTII,S$GLB, | Performed by: INTERNAL MEDICINE

## 2020-10-12 PROCEDURE — 3008F PR BODY MASS INDEX (BMI) DOCUMENTED: ICD-10-PCS | Mod: HCNC,CPTII,S$GLB, | Performed by: INTERNAL MEDICINE

## 2020-10-12 PROCEDURE — 99999 PR PBB SHADOW E&M-EST. PATIENT-LVL IV: ICD-10-PCS | Mod: PBBFAC,HCNC,, | Performed by: INTERNAL MEDICINE

## 2020-10-12 PROCEDURE — 1159F MED LIST DOCD IN RCRD: CPT | Mod: HCNC,S$GLB,, | Performed by: INTERNAL MEDICINE

## 2020-10-12 PROCEDURE — 99999 PR PBB SHADOW E&M-EST. PATIENT-LVL IV: CPT | Mod: PBBFAC,HCNC,, | Performed by: INTERNAL MEDICINE

## 2020-10-12 NOTE — PATIENT INSTRUCTIONS
Mild intermittent asthma without complication: advair inhaler as maintenance; albuterol rescue as needed.     Other hyperlipidemia: Maintain low fat high fiber diet, exercise regularly. Weight reduction where indicated. Cont rosuvastatin  -     Comprehensive Metabolic Panel; Future; Expected date: 10/12/2020    Type 2 diabetes mellitus without complication, without long-term current use of insulin: Maintain a low carb diet; monitor CBG's at home; keep a log for review. Same tx.   -     Microalbumin/Creatinine Ratio, Urine; Future; Expected date: 10/12/2020  -     Hemoglobin A1C; Future; Expected date: 10/12/2020  -     Comprehensive Metabolic Panel; Future; Expected date: 10/12/2020    Encounter for diabetic foot exam    Subclinical hypothyroidism: sees Dr Light endocrine service  -     T4, Free; Future; Expected date: 10/12/2020  -     TSH; Future; Expected date: 10/12/2020  -     T3, Free; Future; Expected date: 10/12/2020    Elevated TSH: asymptomatic. Rx if TSH >10 or sx.   -     T4, Free; Future; Expected date: 10/12/2020  -     TSH; Future; Expected date: 10/12/2020  -     T3, Free; Future; Expected date: 10/12/2020    Multiple thyroid nodules: not meeting FNA criteria; US in 12 mos by endocrine service.   -     T4, Free; Future; Expected date: 10/12/2020  -     TSH; Future; Expected date: 10/12/2020  -     T3, Free; Future; Expected date: 10/12/2020    Class 1 obesity due to excess calories with serious comorbidity and body mass index (BMI) of 33.0 to 33.9 in adult: Caloric restriction w regular exercise and weight reduction.    Osteopenia of neck of femur, unspecified laterality: needs DEXA updated; Weight bearing exercises, continue calcium and vit D supplements. DEXA scabn at 2 yr intervals as needed.  -     Vitamin D; Future; Expected date: 10/12/2020  -     DXA Bone Density Spine And Hip; Future; Expected date: 10/12/2020    Vitamin D insufficiency: takes 3000 iu a day Vit D3.   -     Comprehensive  Metabolic Panel; Future; Expected date: 10/12/2020  -     Vitamin D; Future; Expected date: 10/12/2020  -     DXA Bone Density Spine And Hip; Future; Expected date: 10/12/2020    Postmenopausal  -     Vitamin D; Future; Expected date: 10/12/2020  -     DXA Bone Density Spine And Hip; Future; Expected date: 10/12/2020

## 2020-10-12 NOTE — ADDENDUM NOTE
Addended by: RAQUEL BENÍTEZ on: 10/12/2020 09:35 AM     Modules accepted: Orders, Level of Service

## 2020-10-12 NOTE — PROGRESS NOTES
Subjective:       Patient ID: Breanna Silva is a 70 y.o. female.    Chief Complaint: Follow-up (review lab work)    HPI  here today for reassessment and go over labs overall she has been doing well  Mild intermittent asthma without complication:  Not needing to use her rescue inhaler; uses her Advair inhaler maintenance around 3 to 4 times a week if she goes outside.  Other hyperlipidemia:  On low-fat high-fiber diet or least tries; tolerates Crestor generic fine.  Lipid profile:  Cholesterol 158/triglyceride 79/HDL is a 83/LDL 59.  LDL goal less than 70  Diabetes mellitus type 2:  Watches her carbohydrates; blood sugars running at home between 120s and 130s.  Fasting blood sugar 128 with GFR greater than 60.  Hemoglobin A1c improved from 7.6 to presently 6.8.  Average blood sugar 148 by hemoglobin A1c.  Encounter for foot exam today; to see how ophthalmologist in December of 2020  Hypothyroidism:  Not on levothyroxine; following her thyroid function test.  Endocrine recommends treating if TSH greater than 10 or elevated and patient is symptomatic.  TSH 3.807, 4.38.  Multiple thyroid nodules:  Recently referred Endocrine Dr. Light with recommendations for continued observation and repeat ultrasound of her thyroid in 1 year; endocrine note reviewed; thyroid ultrasound suggest small heterogeneous thyroid suggestive of possible hypothyroidism; no nodules meeting fine needle aspirate criteria  Obesity:  BMI 33.37; has lost 5 lb since 08/25/2020; knows the importance of regular exercise and small portions to help her weight come down.  Healthcare maintenance:  Did fine with recent mammogram 08/24/2020 with no radiographic evidence of malignancy; repeat screen in 1 year.  Microscopic hematuria:  Sees urology Dr. Blankenship; recent cystoscopy 08/13/2020 within normal limits.  Osteopenia/vitamin-D insufficiency:  DEXA scan reviewed with patient 07/24/2018 T-score lumbar spine -0.5 within normal limits; femoral bone  density T-score -2.2 or severe osteopenia.  Needs updated DEXA scan.  On vitamin-D supplementation will check vitamin-D level on follow-up.  On Fosamax 35 mg weekly. Weight-bearing exercise with a vitamin-D supplementation to continue.  Colon cancer screening/history of polyps:  08/08/2017 colonoscopy Dr. Butler; 3-4 polyps removed recommended repeat in 5 years or after 08/08/2022    PMH and surgical hx delineated and noted; SMH/FMH also delineated and noted. ROS obtained at length prior to physical exam being performed.  Total time:  8:12 a.m. to 9:30 a.m..  Greater than 50% of the time spent in discussion, counseling, and review.  Labs reviewed with patient and ordered for follow-up.  DEXA scan also reviewed with patient and ordered for follow-up.    Review of Systems   Constitutional: Negative for appetite change and fever.   HENT: Negative for congestion, postnasal drip, rhinorrhea and sinus pressure.    Eyes: Negative for discharge and itching.   Respiratory: Negative for cough, chest tightness, shortness of breath and wheezing.    Cardiovascular: Negative for chest pain, palpitations and leg swelling.   Gastrointestinal: Negative for abdominal distention, abdominal pain, blood in stool, constipation, diarrhea, nausea and vomiting.   Endocrine: Negative for polydipsia, polyphagia and polyuria.   Genitourinary: Negative for dysuria and hematuria.   Musculoskeletal: Negative for arthralgias and myalgias.   Skin: Negative for rash.   Allergic/Immunologic: Negative for environmental allergies and food allergies.   Neurological: Negative for tremors, seizures and syncope.   Hematological: Negative for adenopathy. Does not bruise/bleed easily.       Objective:      Vitals:    10/12/20 0802   BP: 120/64   Pulse: 85   Temp: 98.1 °F (36.7 °C)   TempSrc: Temporal   SpO2: 97%   Weight: 77.5 kg (170 lb 13.7 oz)   Height: 5' (1.524 m)     Body mass index is 33.37 kg/m².  Wt Readings from Last 3 Encounters:   10/12/20 77.5  kg (170 lb 13.7 oz)   08/25/20 79.8 kg (176 lb)   08/17/20 79.8 kg (176 lb)        Physical Exam  Vitals signs reviewed.   Constitutional:       Appearance: She is well-developed.   HENT:      Head: Normocephalic and atraumatic.   Neck:      Musculoskeletal: Normal range of motion and neck supple.      Thyroid: No thyromegaly.      Vascular: No carotid bruit.   Cardiovascular:      Rate and Rhythm: Normal rate and regular rhythm.      Pulses:           Dorsalis pedis pulses are 2+ on the right side and 2+ on the left side.      Heart sounds: Normal heart sounds. No murmur. No gallop.    Pulmonary:      Effort: Pulmonary effort is normal. No respiratory distress.      Breath sounds: Normal breath sounds. No wheezing or rales.   Abdominal:      General: Bowel sounds are normal. There is no distension.      Palpations: Abdomen is soft.      Tenderness: There is no abdominal tenderness. There is no guarding or rebound.   Musculoskeletal: Normal range of motion.      Right lower leg: No edema.      Left lower leg: No edema.      Right foot: Normal range of motion. No deformity, bunion or foot drop.      Left foot: Normal range of motion. No deformity, bunion or foot drop.   Feet:      Right foot:      Protective Sensation: 10 sites tested. 10 sites sensed.      Skin integrity: Skin integrity normal.      Toenail Condition: Right toenails are normal.      Left foot:      Protective Sensation: 10 sites tested. 10 sites sensed.      Skin integrity: Skin integrity normal.      Toenail Condition: Left toenails are normal.   Lymphadenopathy:      Cervical: No cervical adenopathy.   Skin:     Findings: No rash.      Comments: Min callus right first toe medially.    Neurological:      Mental Status: She is alert and oriented to person, place, and time.      Comments: Moves all 4 extremities fine.   Psychiatric:         Behavior: Behavior normal.         Thought Content: Thought content normal.         Assessment:       1. Mild  intermittent asthma without complication    2. Other hyperlipidemia    3. Type 2 diabetes mellitus without complication, without long-term current use of insulin    4. Encounter for diabetic foot exam    5. Subclinical hypothyroidism    6. Elevated TSH    7. Multiple thyroid nodules    8. Class 1 obesity due to excess calories with serious comorbidity and body mass index (BMI) of 33.0 to 33.9 in adult    9. Osteopenia of neck of femur, unspecified laterality    10. Vitamin D insufficiency    11. Postmenopausal    12. Encounter for laboratory test    13. Healthcare maintenance        Plan:       Mild intermittent asthma without complication: advair inhaler as maintenance; albuterol rescue as needed.     Other hyperlipidemia: Maintain low fat high fiber diet, exercise regularly. Weight reduction where indicated. Cont rosuvastatin  -     Comprehensive Metabolic Panel; Future; Expected date: 10/12/2020    Type 2 diabetes mellitus without complication, without long-term current use of insulin: Maintain a low carb diet; monitor CBG's at home; keep a log for review. Same tx.  To see her ophthalmologist in December of 2020 Dr. Clemente  -     Microalbumin/Creatinine Ratio, Urine; Future; Expected date: 10/12/2020  -     Hemoglobin A1C; Future; Expected date: 10/12/2020  -     Comprehensive Metabolic Panel; Future; Expected date: 10/12/2020    Encounter for diabetic foot exam    Subclinical hypothyroidism: sees Dr Light endocrine service  -     T4, Free; Future; Expected date: 10/12/2020  -     TSH; Future; Expected date: 10/12/2020  -     T3, Free; Future; Expected date: 10/12/2020    Elevated TSH: asymptomatic. Rx if TSH >10 or sx.   -     T4, Free; Future; Expected date: 10/12/2020  -     TSH; Future; Expected date: 10/12/2020  -     T3, Free; Future; Expected date: 10/12/2020    Multiple thyroid nodules: not meeting FNA criteria; US in 12 mos by endocrine service.   -     T4, Free; Future; Expected date: 10/12/2020  -      TSH; Future; Expected date: 10/12/2020  -     T3, Free; Future; Expected date: 10/12/2020    Class 1 obesity due to excess calories with serious comorbidity and body mass index (BMI) of 33.0 to 33.9 in adult: Caloric restriction w regular exercise and weight reduction.    Osteopenia of neck of femur, unspecified laterality: needs DEXA updated; Weight bearing exercises, continue Fosamax 35 mg weekly and vit D supplements. DEXA scabn at 2 yr intervals as needed.  DEXA update needed  -     Vitamin D; Future; Expected date: 10/12/2020  -     DXA Bone Density Spine And Hip; Future; Expected date: 10/12/2020    Vitamin D insufficiency: takes 3000 iu a day Vit D3.   -     Comprehensive Metabolic Panel; Future; Expected date: 10/12/2020  -     Vitamin D; Future; Expected date: 10/12/2020  -     DXA Bone Density Spine And Hip; Future; Expected date: 10/12/2020    Postmenopausal  -     Vitamin D; Future; Expected date: 10/12/2020  -     DXA Bone Density Spine And Hip; Future; Expected date: 10/12/2020    Encounter for lab test:  Labs reviewed with patient at length in ordered for follow-up    Healthcare maintenance:  Repeat screening digital mammogram due after 08/24/2021    Colon cancer screening:  Last total colonoscopy 08/08/2017 by GI Dr. Butler with 3-4 polyps removed; repeat TC recommended in 5 years or after 08/08/2022

## 2020-10-26 ENCOUNTER — HOSPITAL ENCOUNTER (OUTPATIENT)
Dept: RADIOLOGY | Facility: HOSPITAL | Age: 70
Discharge: HOME | End: 2020-10-26
Attending: INTERNAL MEDICINE
Payer: MEDICARE

## 2020-10-26 DIAGNOSIS — E55.9 VITAMIN D INSUFFICIENCY: ICD-10-CM

## 2020-10-26 DIAGNOSIS — M85.859 OSTEOPENIA OF NECK OF FEMUR, UNSPECIFIED LATERALITY: ICD-10-CM

## 2020-10-26 DIAGNOSIS — Z78.0 POSTMENOPAUSAL: ICD-10-CM

## 2020-10-26 PROCEDURE — 77080 DXA BONE DENSITY AXIAL: CPT | Mod: TC,HCNC,PO

## 2020-10-26 PROCEDURE — 77080 DEXA BONE DENSITY SPINE HIP: ICD-10-PCS | Mod: 26,HCNC,, | Performed by: RADIOLOGY

## 2020-10-26 PROCEDURE — 77080 DXA BONE DENSITY AXIAL: CPT | Mod: 26,HCNC,, | Performed by: RADIOLOGY

## 2020-11-25 ENCOUNTER — TELEPHONE (OUTPATIENT)
Dept: FAMILY MEDICINE | Facility: CLINIC | Age: 70
End: 2020-11-25

## 2020-11-25 NOTE — TELEPHONE ENCOUNTER
Please call patient and I have reviewed her DEXA scan results earlier in she has severe osteopenia.  Please ask her to schedule a clinic follow-up sometime in the next few weeks to go over the results and discuss plan of care in more detail.

## 2020-12-08 ENCOUNTER — TELEPHONE (OUTPATIENT)
Dept: ADMINISTRATIVE | Facility: HOSPITAL | Age: 70
End: 2020-12-08

## 2020-12-08 DIAGNOSIS — E78.49 OTHER HYPERLIPIDEMIA: ICD-10-CM

## 2020-12-08 DIAGNOSIS — E11.9 TYPE 2 DIABETES MELLITUS WITHOUT COMPLICATION, WITHOUT LONG-TERM CURRENT USE OF INSULIN: Primary | ICD-10-CM

## 2020-12-08 NOTE — TELEPHONE ENCOUNTER
The patient is showing as non-compliant on the Statin Therapy Measure.   Rosuvastatin is listed in the current medication list.  Last filled in Dec 2019.  Pharmacy stated patient is no longer taking medication as of 8/12/20.    Please review and advise as medication is needed due to patient being diabetic.      Thank You

## 2020-12-09 RX ORDER — ROSUVASTATIN CALCIUM 10 MG/1
10 TABLET, COATED ORAL NIGHTLY
Qty: 90 TABLET | Refills: 3 | Status: SHIPPED | OUTPATIENT
Start: 2020-12-08 | End: 2022-03-04

## 2020-12-09 NOTE — TELEPHONE ENCOUNTER
Rosuvastatin/Crestor at 10 mg nightly was refilled for patient number [phone] with 3 refills.  Last note 10/12/2020 shows patient is tolerating Crestor fine under other hyperlipidemia.  Please notify patient was sent for her pharmacy for her to

## 2020-12-14 ENCOUNTER — TELEPHONE (OUTPATIENT)
Dept: FAMILY MEDICINE | Facility: CLINIC | Age: 70
End: 2020-12-14

## 2020-12-14 ENCOUNTER — OFFICE VISIT (OUTPATIENT)
Dept: FAMILY MEDICINE | Facility: CLINIC | Age: 70
End: 2020-12-14
Payer: MEDICARE

## 2020-12-14 VITALS
BODY MASS INDEX: 34.15 KG/M2 | WEIGHT: 173.94 LBS | DIASTOLIC BLOOD PRESSURE: 64 MMHG | TEMPERATURE: 98 F | SYSTOLIC BLOOD PRESSURE: 138 MMHG | HEIGHT: 60 IN | HEART RATE: 88 BPM | OXYGEN SATURATION: 96 %

## 2020-12-14 DIAGNOSIS — J45.20 MILD INTERMITTENT ASTHMA WITHOUT COMPLICATION: ICD-10-CM

## 2020-12-14 DIAGNOSIS — Z01.89 ENCOUNTER FOR LABORATORY TEST: ICD-10-CM

## 2020-12-14 DIAGNOSIS — E55.9 VITAMIN D INSUFFICIENCY: ICD-10-CM

## 2020-12-14 DIAGNOSIS — E11.9 TYPE 2 DIABETES MELLITUS WITHOUT COMPLICATION, WITHOUT LONG-TERM CURRENT USE OF INSULIN: ICD-10-CM

## 2020-12-14 DIAGNOSIS — E78.49 OTHER HYPERLIPIDEMIA: Primary | ICD-10-CM

## 2020-12-14 DIAGNOSIS — E66.9 CLASS 1 OBESITY WITH SERIOUS COMORBIDITY AND BODY MASS INDEX (BMI) OF 33.0 TO 33.9 IN ADULT, UNSPECIFIED OBESITY TYPE: ICD-10-CM

## 2020-12-14 DIAGNOSIS — M85.852 OSTEOPENIA OF NECK OF LEFT FEMUR: Primary | ICD-10-CM

## 2020-12-14 DIAGNOSIS — E78.49 OTHER HYPERLIPIDEMIA: ICD-10-CM

## 2020-12-14 PROCEDURE — 99213 OFFICE O/P EST LOW 20 MIN: CPT | Mod: HCNC,S$GLB,, | Performed by: INTERNAL MEDICINE

## 2020-12-14 PROCEDURE — 3288F PR FALLS RISK ASSESSMENT DOCUMENTED: ICD-10-PCS | Mod: HCNC,CPTII,S$GLB, | Performed by: INTERNAL MEDICINE

## 2020-12-14 PROCEDURE — 99999 PR PBB SHADOW E&M-EST. PATIENT-LVL IV: ICD-10-PCS | Mod: PBBFAC,HCNC,, | Performed by: INTERNAL MEDICINE

## 2020-12-14 PROCEDURE — 1101F PR PT FALLS ASSESS DOC 0-1 FALLS W/OUT INJ PAST YR: ICD-10-PCS | Mod: HCNC,CPTII,S$GLB, | Performed by: INTERNAL MEDICINE

## 2020-12-14 PROCEDURE — 99213 PR OFFICE/OUTPT VISIT, EST, LEVL III, 20-29 MIN: ICD-10-PCS | Mod: HCNC,S$GLB,, | Performed by: INTERNAL MEDICINE

## 2020-12-14 PROCEDURE — 1159F PR MEDICATION LIST DOCUMENTED IN MEDICAL RECORD: ICD-10-PCS | Mod: HCNC,S$GLB,, | Performed by: INTERNAL MEDICINE

## 2020-12-14 PROCEDURE — 1159F MED LIST DOCD IN RCRD: CPT | Mod: HCNC,S$GLB,, | Performed by: INTERNAL MEDICINE

## 2020-12-14 PROCEDURE — 3044F PR MOST RECENT HEMOGLOBIN A1C LEVEL <7.0%: ICD-10-PCS | Mod: HCNC,CPTII,S$GLB, | Performed by: INTERNAL MEDICINE

## 2020-12-14 PROCEDURE — 3288F FALL RISK ASSESSMENT DOCD: CPT | Mod: HCNC,CPTII,S$GLB, | Performed by: INTERNAL MEDICINE

## 2020-12-14 PROCEDURE — 3008F PR BODY MASS INDEX (BMI) DOCUMENTED: ICD-10-PCS | Mod: HCNC,CPTII,S$GLB, | Performed by: INTERNAL MEDICINE

## 2020-12-14 PROCEDURE — 1101F PT FALLS ASSESS-DOCD LE1/YR: CPT | Mod: HCNC,CPTII,S$GLB, | Performed by: INTERNAL MEDICINE

## 2020-12-14 PROCEDURE — 99999 PR PBB SHADOW E&M-EST. PATIENT-LVL IV: CPT | Mod: PBBFAC,HCNC,, | Performed by: INTERNAL MEDICINE

## 2020-12-14 PROCEDURE — 3008F BODY MASS INDEX DOCD: CPT | Mod: HCNC,CPTII,S$GLB, | Performed by: INTERNAL MEDICINE

## 2020-12-14 PROCEDURE — 3044F HG A1C LEVEL LT 7.0%: CPT | Mod: HCNC,CPTII,S$GLB, | Performed by: INTERNAL MEDICINE

## 2020-12-14 NOTE — PATIENT INSTRUCTIONS
Osteopenia of neck of left femur: needs to start taking fosamax every 7 days; cont calcium, vit D supplements; weight bearing exercisees w goal 5x a week for 30 minutes.   DEXA  1 yr after 10/26/20    Type 2 diabetes mellitus without complication, without long-term current use of insulin: Maintain a low carb diet; monitor CBG's at home; keep a log for review.    Mild intermittent asthma without complication: has been stable; rare use of rescue inhaler.     Class 1 obesity with serious comorbidity and body mass index (BMI) of 33.0 to 33.9 in adult, unspecified obesity type; Caloric restriction w regular exercise and weight reduction.

## 2020-12-14 NOTE — TELEPHONE ENCOUNTER
Ms Silva did not know when to come back and Dr Nicholas didn't say. Can someone call her so she will know when to make her lab appt. [phone]

## 2020-12-14 NOTE — TELEPHONE ENCOUNTER
Please notify patient to schedule her follow-up appointment in 3 months she will need to obtain overnight fasting labs 1 week prior.  Please include 10/12/2020 labs already ordered and lipid profile ordered today for follow-up

## 2020-12-14 NOTE — PROGRESS NOTES
Subjective:       Patient ID: Breanna Silva is a 70 y.o. female.    Chief Complaint: Results (review Dexa scan)    HPI  Patient here today to discuss her DEXA results.  Osteopenia:  10/26/2020 DEXA scan with left femoral neck T-score -2.3 and a total hip T-score -1.8; apparently some improvement noted with previous 07/24/2018 DEXA scan with femoral bone T-score of -2.2; continue 10/26/2020 DEXA scan results: lumbar spine-1.0.  She has been missing her Fosamax frequently and takes it instead of every 7 days every 10-12 days which may account for the fact that her numbers have not improved that much but still seem to have improved.  Have also recommended that she increase her weight-bearing exercise from only 15 min to 30 min and with a goal of 5 times a week.  No cigarettes no alcohol continue calcium with vitamin-D supplementation.    07/24/2018 DEXA scan revealed femoral bone with T-score -2.2 and lumbar spine -1.0.   Vitamin-D insufficiency:  Continue calcium with vitamin-D supplementation she is on calcium carbonate 600+ D3 twice a day and vitamin D3 3000 IU use in the morning and 2000 IU use in the afternoon per day; 6/29 vitamin-D level 41    Diabetes mellitus type 2:  Watches her carbohydrate intake; blood sugars have been running 01/21/2025 in the morning.   with A1c 6.8  Mild intermittent asthma without complication; no need for rescue inhaler essentially tolerated well.  Other hyperlipidemia:  On low-fat high-fiber diet or least tries; tolerates rosuvastatin fine.  Obesity:  BMI 33.97; weight-bearing exercises 5 times a week minimum 30 min as goal; has gained 3 lb since 10/12/2020.  Other hyperlipidemia:  Low-fat high-fiber diet with tolerating rosuvastatin fine.  Lipid profile therapeutic.    Review of Systems   Constitutional: Negative for appetite change and fever.   HENT: Negative for congestion, postnasal drip, rhinorrhea and sinus pressure.    Eyes: Negative for discharge and itching.    Respiratory: Negative for cough, chest tightness, shortness of breath and wheezing.    Cardiovascular: Negative for chest pain, palpitations and leg swelling.   Gastrointestinal: Negative for abdominal distention, abdominal pain, blood in stool, constipation, diarrhea, nausea and vomiting.   Endocrine: Negative for polydipsia, polyphagia and polyuria.   Genitourinary: Negative for dysuria and hematuria.   Musculoskeletal: Negative for arthralgias and myalgias.   Skin: Negative for rash.   Allergic/Immunologic: Negative for environmental allergies and food allergies.   Neurological: Negative for tremors, seizures and syncope.   Hematological: Negative for adenopathy. Does not bruise/bleed easily.       Objective:      Vitals:    12/14/20 1059   BP: 138/64  Comment: avg 2 wks ago 117/58   BP Location: Left arm   Patient Position: Sitting   BP Method: Large (Manual)   Pulse: 88   Temp: 97.5 °F (36.4 °C)   TempSrc: Temporal   SpO2: 96%   Weight: 78.9 kg (173 lb 15.1 oz)   Height: 5' (1.524 m)     Body mass index is 33.97 kg/m².  Wt Readings from Last 3 Encounters:   12/14/20 78.9 kg (173 lb 15.1 oz)   10/12/20 77.5 kg (170 lb 13.7 oz)   08/25/20 79.8 kg (176 lb)        Physical Exam  Vitals signs reviewed.   Constitutional:       Appearance: She is well-developed.   HENT:      Head: Normocephalic and atraumatic.   Neck:      Musculoskeletal: Normal range of motion and neck supple.      Thyroid: No thyromegaly.   Cardiovascular:      Rate and Rhythm: Normal rate and regular rhythm.      Heart sounds: Normal heart sounds. No murmur. No gallop.    Pulmonary:      Effort: Pulmonary effort is normal. No respiratory distress.      Breath sounds: Normal breath sounds. No wheezing or rales.   Abdominal:      General: Bowel sounds are normal. There is no distension.      Palpations: Abdomen is soft.      Tenderness: There is no abdominal tenderness. There is no guarding or rebound.   Musculoskeletal: Normal range of motion.       Right lower leg: No edema.      Left lower leg: No edema.   Lymphadenopathy:      Cervical: No cervical adenopathy.   Skin:     Findings: No rash.   Neurological:      Mental Status: She is alert and oriented to person, place, and time.      Comments: Moves all 4 extremities fine.   Psychiatric:         Behavior: Behavior normal.         Thought Content: Thought content normal.         Assessment:       1. Osteopenia of neck of left femur    2. Vitamin D insufficiency    3. Type 2 diabetes mellitus without complication, without long-term current use of insulin    4. Mild intermittent asthma without complication    5. Class 1 obesity with serious comorbidity and body mass index (BMI) of 33.0 to 33.9 in adult, unspecified obesity type    6. Other hyperlipidemia    7. Encounter for laboratory test        Plan:       Osteopenia of neck of left femur: needs to start taking fosamax every 7 days; cont calcium, vit D supplements; increased weight bearing exercisees w goal 5x a week for 30 minutes.   DEXA  1 yr after 10/26/20    Vitamin-D insufficiency:  Continue calcium carbonate 600+ D3 twice a day and vitamin D3 3000 IU use in the morning and 2000 IU use any afternoon per day    Type 2 diabetes mellitus without complication, without long-term current use of insulin: Maintain a low carb diet; monitor CBG's at home; keep a log for review.  Blood sugars have been running 120-125 in the morning    Mild intermittent asthma without complication: has been stable; rare use of rescue inhaler.     Class 1 obesity with serious comorbidity and body mass index (BMI) of 33.0 to 33.9 in adult, unspecified obesity type; Caloric restriction w regular exercise and weight reduction.    Encounter for lab test:  Labs reviewed and ordered for follow-up.

## 2020-12-29 LAB
LEFT EYE DM RETINOPATHY: NEGATIVE
RIGHT EYE DM RETINOPATHY: NEGATIVE

## 2021-01-19 DIAGNOSIS — E11.9 TYPE 2 DIABETES MELLITUS WITHOUT COMPLICATION, WITHOUT LONG-TERM CURRENT USE OF INSULIN: ICD-10-CM

## 2021-01-21 ENCOUNTER — PATIENT OUTREACH (OUTPATIENT)
Dept: ADMINISTRATIVE | Facility: HOSPITAL | Age: 71
End: 2021-01-21

## 2021-01-21 DIAGNOSIS — E11.9 TYPE 2 DIABETES MELLITUS WITHOUT COMPLICATION, WITHOUT LONG-TERM CURRENT USE OF INSULIN: ICD-10-CM

## 2021-01-22 RX ORDER — CALCIUM CITRATE/VITAMIN D3 200MG-6.25
TABLET ORAL
Qty: 180 STRIP | Refills: 1 | Status: SHIPPED | OUTPATIENT
Start: 2021-01-22 | End: 2022-08-11

## 2021-01-22 RX ORDER — CALCIUM CITRATE/VITAMIN D3 200MG-6.25
TABLET ORAL
Qty: 180 STRIP | Refills: 1 | Status: SHIPPED | OUTPATIENT
Start: 2021-01-22 | End: 2021-09-16

## 2021-01-26 ENCOUNTER — TELEPHONE (OUTPATIENT)
Dept: FAMILY MEDICINE | Facility: CLINIC | Age: 71
End: 2021-01-26

## 2021-03-08 ENCOUNTER — LAB VISIT (OUTPATIENT)
Dept: LAB | Facility: HOSPITAL | Age: 71
End: 2021-03-08
Attending: INTERNAL MEDICINE
Payer: MEDICARE

## 2021-03-08 DIAGNOSIS — M85.859 OSTEOPENIA OF NECK OF FEMUR, UNSPECIFIED LATERALITY: ICD-10-CM

## 2021-03-08 DIAGNOSIS — Z91.89 AT RISK FOR SIDE EFFECT OF MEDICATION: ICD-10-CM

## 2021-03-08 DIAGNOSIS — R79.89 ELEVATED TSH: ICD-10-CM

## 2021-03-08 DIAGNOSIS — E78.49 OTHER HYPERLIPIDEMIA: ICD-10-CM

## 2021-03-08 DIAGNOSIS — R31.29 MICROSCOPIC HEMATURIA: ICD-10-CM

## 2021-03-08 DIAGNOSIS — E04.2 MULTIPLE THYROID NODULES: ICD-10-CM

## 2021-03-08 DIAGNOSIS — Z78.0 POSTMENOPAUSAL: ICD-10-CM

## 2021-03-08 DIAGNOSIS — E11.9 TYPE 2 DIABETES MELLITUS WITHOUT COMPLICATION, WITHOUT LONG-TERM CURRENT USE OF INSULIN: ICD-10-CM

## 2021-03-08 DIAGNOSIS — E55.9 VITAMIN D INSUFFICIENCY: ICD-10-CM

## 2021-03-08 LAB
ALBUMIN SERPL BCP-MCNC: 3.7 G/DL (ref 3.5–5.2)
ALP SERPL-CCNC: 48 U/L (ref 55–135)
ALT SERPL W/O P-5'-P-CCNC: 31 U/L (ref 10–44)
ANION GAP SERPL CALC-SCNC: 9 MMOL/L (ref 8–16)
AST SERPL-CCNC: 29 U/L (ref 10–40)
BASOPHILS # BLD AUTO: 0.06 K/UL (ref 0–0.2)
BASOPHILS NFR BLD: 0.9 % (ref 0–1.9)
BILIRUB SERPL-MCNC: 0.4 MG/DL (ref 0.1–1)
BUN SERPL-MCNC: 12 MG/DL (ref 8–23)
CALCIUM SERPL-MCNC: 9.1 MG/DL (ref 8.7–10.5)
CHLORIDE SERPL-SCNC: 107 MMOL/L (ref 95–110)
CHOLEST SERPL-MCNC: 146 MG/DL (ref 120–199)
CHOLEST/HDLC SERPL: 2 {RATIO} (ref 2–5)
CO2 SERPL-SCNC: 26 MMOL/L (ref 23–29)
CREAT SERPL-MCNC: 0.8 MG/DL (ref 0.5–1.4)
DIFFERENTIAL METHOD: NORMAL
EOSINOPHIL # BLD AUTO: 0.2 K/UL (ref 0–0.5)
EOSINOPHIL NFR BLD: 2.9 % (ref 0–8)
ERYTHROCYTE [DISTWIDTH] IN BLOOD BY AUTOMATED COUNT: 12.7 % (ref 11.5–14.5)
EST. GFR  (AFRICAN AMERICAN): >60 ML/MIN/1.73 M^2
EST. GFR  (NON AFRICAN AMERICAN): >60 ML/MIN/1.73 M^2
GLUCOSE SERPL-MCNC: 130 MG/DL (ref 70–110)
HCT VFR BLD AUTO: 41.1 % (ref 37–48.5)
HDLC SERPL-MCNC: 72 MG/DL (ref 40–75)
HDLC SERPL: 49.3 % (ref 20–50)
HGB BLD-MCNC: 13.4 G/DL (ref 12–16)
IMM GRANULOCYTES # BLD AUTO: 0.02 K/UL (ref 0–0.04)
IMM GRANULOCYTES NFR BLD AUTO: 0.3 % (ref 0–0.5)
LDLC SERPL CALC-MCNC: 61.6 MG/DL (ref 63–159)
LYMPHOCYTES # BLD AUTO: 2.9 K/UL (ref 1–4.8)
LYMPHOCYTES NFR BLD: 43.8 % (ref 18–48)
MCH RBC QN AUTO: 29.4 PG (ref 27–31)
MCHC RBC AUTO-ENTMCNC: 32.6 G/DL (ref 32–36)
MCV RBC AUTO: 90 FL (ref 82–98)
MONOCYTES # BLD AUTO: 0.5 K/UL (ref 0.3–1)
MONOCYTES NFR BLD: 6.8 % (ref 4–15)
NEUTROPHILS # BLD AUTO: 3 K/UL (ref 1.8–7.7)
NEUTROPHILS NFR BLD: 45.3 % (ref 38–73)
NONHDLC SERPL-MCNC: 74 MG/DL
NRBC BLD-RTO: 0 /100 WBC
PLATELET # BLD AUTO: 235 K/UL (ref 150–350)
PMV BLD AUTO: 11.1 FL (ref 9.2–12.9)
POTASSIUM SERPL-SCNC: 4.6 MMOL/L (ref 3.5–5.1)
PROT SERPL-MCNC: 7 G/DL (ref 6–8.4)
RBC # BLD AUTO: 4.56 M/UL (ref 4–5.4)
SODIUM SERPL-SCNC: 142 MMOL/L (ref 136–145)
T3FREE SERPL-MCNC: 2.5 PG/ML (ref 2.3–4.2)
T4 FREE SERPL-MCNC: 1.02 NG/DL (ref 0.71–1.51)
TRIGL SERPL-MCNC: 62 MG/DL (ref 30–150)
TSH SERPL DL<=0.005 MIU/L-ACNC: 4.01 UIU/ML (ref 0.4–4)
WBC # BLD AUTO: 6.57 K/UL (ref 3.9–12.7)

## 2021-03-08 PROCEDURE — 83036 HEMOGLOBIN GLYCOSYLATED A1C: CPT | Performed by: INTERNAL MEDICINE

## 2021-03-08 PROCEDURE — 84439 ASSAY OF FREE THYROXINE: CPT | Performed by: INTERNAL MEDICINE

## 2021-03-08 PROCEDURE — 84481 FREE ASSAY (FT-3): CPT | Performed by: INTERNAL MEDICINE

## 2021-03-08 PROCEDURE — 84443 ASSAY THYROID STIM HORMONE: CPT | Performed by: INTERNAL MEDICINE

## 2021-03-08 PROCEDURE — 80061 LIPID PANEL: CPT | Performed by: INTERNAL MEDICINE

## 2021-03-08 PROCEDURE — 80053 COMPREHEN METABOLIC PANEL: CPT | Performed by: INTERNAL MEDICINE

## 2021-03-08 PROCEDURE — 85025 COMPLETE CBC W/AUTO DIFF WBC: CPT | Performed by: INTERNAL MEDICINE

## 2021-03-08 PROCEDURE — 82306 VITAMIN D 25 HYDROXY: CPT | Performed by: INTERNAL MEDICINE

## 2021-03-08 PROCEDURE — 36415 COLL VENOUS BLD VENIPUNCTURE: CPT | Mod: PO | Performed by: INTERNAL MEDICINE

## 2021-03-09 DIAGNOSIS — E11.9 TYPE 2 DIABETES MELLITUS WITHOUT COMPLICATION, WITHOUT LONG-TERM CURRENT USE OF INSULIN: ICD-10-CM

## 2021-03-09 LAB
ESTIMATED AVG GLUCOSE: 137 MG/DL (ref 68–131)
HBA1C MFR BLD: 6.4 % (ref 4–5.6)

## 2021-03-09 RX ORDER — METFORMIN HYDROCHLORIDE 500 MG/1
TABLET, EXTENDED RELEASE ORAL
Qty: 180 TABLET | Refills: 1 | Status: SHIPPED | OUTPATIENT
Start: 2021-03-09 | End: 2021-09-16

## 2021-03-10 LAB — 25(OH)D3+25(OH)D2 SERPL-MCNC: 109 NG/ML (ref 30–96)

## 2021-03-15 ENCOUNTER — OFFICE VISIT (OUTPATIENT)
Dept: FAMILY MEDICINE | Facility: CLINIC | Age: 71
End: 2021-03-15
Payer: MEDICARE

## 2021-03-15 VITALS
RESPIRATION RATE: 18 BRPM | HEART RATE: 84 BPM | BODY MASS INDEX: 33.33 KG/M2 | SYSTOLIC BLOOD PRESSURE: 124 MMHG | DIASTOLIC BLOOD PRESSURE: 62 MMHG | HEIGHT: 60 IN | TEMPERATURE: 98 F | WEIGHT: 169.75 LBS

## 2021-03-15 DIAGNOSIS — E03.9 HYPOTHYROIDISM, UNSPECIFIED TYPE: ICD-10-CM

## 2021-03-15 DIAGNOSIS — E78.49 OTHER HYPERLIPIDEMIA: Primary | ICD-10-CM

## 2021-03-15 DIAGNOSIS — R03.0 PRE-HYPERTENSION: ICD-10-CM

## 2021-03-15 DIAGNOSIS — E66.9 CLASS 1 OBESITY WITH SERIOUS COMORBIDITY AND BODY MASS INDEX (BMI) OF 33.0 TO 33.9 IN ADULT, UNSPECIFIED OBESITY TYPE: ICD-10-CM

## 2021-03-15 DIAGNOSIS — E04.2 MULTIPLE THYROID NODULES: ICD-10-CM

## 2021-03-15 DIAGNOSIS — E11.9 TYPE 2 DIABETES MELLITUS WITHOUT COMPLICATION, WITHOUT LONG-TERM CURRENT USE OF INSULIN: ICD-10-CM

## 2021-03-15 DIAGNOSIS — J45.20 MILD INTERMITTENT ASTHMA WITHOUT COMPLICATION: ICD-10-CM

## 2021-03-15 DIAGNOSIS — E55.9 VITAMIN D INSUFFICIENCY: ICD-10-CM

## 2021-03-15 DIAGNOSIS — Z01.89 ENCOUNTER FOR LABORATORY TEST: ICD-10-CM

## 2021-03-15 PROCEDURE — 99999 PR PBB SHADOW E&M-EST. PATIENT-LVL III: ICD-10-PCS | Mod: PBBFAC,,, | Performed by: INTERNAL MEDICINE

## 2021-03-15 PROCEDURE — 3044F HG A1C LEVEL LT 7.0%: CPT | Mod: CPTII,S$GLB,, | Performed by: INTERNAL MEDICINE

## 2021-03-15 PROCEDURE — 99214 PR OFFICE/OUTPT VISIT, EST, LEVL IV, 30-39 MIN: ICD-10-PCS | Mod: S$GLB,,, | Performed by: INTERNAL MEDICINE

## 2021-03-15 PROCEDURE — 3008F BODY MASS INDEX DOCD: CPT | Mod: CPTII,S$GLB,, | Performed by: INTERNAL MEDICINE

## 2021-03-15 PROCEDURE — 99499 RISK ADDL DX/OHS AUDIT: ICD-10-PCS | Mod: HCNC,S$GLB,, | Performed by: INTERNAL MEDICINE

## 2021-03-15 PROCEDURE — 3288F PR FALLS RISK ASSESSMENT DOCUMENTED: ICD-10-PCS | Mod: CPTII,S$GLB,, | Performed by: INTERNAL MEDICINE

## 2021-03-15 PROCEDURE — 1101F PR PT FALLS ASSESS DOC 0-1 FALLS W/OUT INJ PAST YR: ICD-10-PCS | Mod: CPTII,S$GLB,, | Performed by: INTERNAL MEDICINE

## 2021-03-15 PROCEDURE — 99214 OFFICE O/P EST MOD 30 MIN: CPT | Mod: S$GLB,,, | Performed by: INTERNAL MEDICINE

## 2021-03-15 PROCEDURE — 1159F PR MEDICATION LIST DOCUMENTED IN MEDICAL RECORD: ICD-10-PCS | Mod: S$GLB,,, | Performed by: INTERNAL MEDICINE

## 2021-03-15 PROCEDURE — 3044F PR MOST RECENT HEMOGLOBIN A1C LEVEL <7.0%: ICD-10-PCS | Mod: CPTII,S$GLB,, | Performed by: INTERNAL MEDICINE

## 2021-03-15 PROCEDURE — 3008F PR BODY MASS INDEX (BMI) DOCUMENTED: ICD-10-PCS | Mod: CPTII,S$GLB,, | Performed by: INTERNAL MEDICINE

## 2021-03-15 PROCEDURE — 99999 PR PBB SHADOW E&M-EST. PATIENT-LVL III: CPT | Mod: PBBFAC,,, | Performed by: INTERNAL MEDICINE

## 2021-03-15 PROCEDURE — 1159F MED LIST DOCD IN RCRD: CPT | Mod: S$GLB,,, | Performed by: INTERNAL MEDICINE

## 2021-03-15 PROCEDURE — 1101F PT FALLS ASSESS-DOCD LE1/YR: CPT | Mod: CPTII,S$GLB,, | Performed by: INTERNAL MEDICINE

## 2021-03-15 PROCEDURE — 99499 UNLISTED E&M SERVICE: CPT | Mod: HCNC,S$GLB,, | Performed by: INTERNAL MEDICINE

## 2021-03-15 PROCEDURE — 3288F FALL RISK ASSESSMENT DOCD: CPT | Mod: CPTII,S$GLB,, | Performed by: INTERNAL MEDICINE

## 2021-03-15 RX ORDER — LORATADINE 10 MG/1
10 TABLET ORAL DAILY PRN
COMMUNITY
Start: 2020-12-31 | End: 2022-06-14 | Stop reason: SDUPTHER

## 2021-03-15 RX ORDER — ACETYLCARNITINE 500 MG
CAPSULE ORAL
COMMUNITY
Start: 2020-12-31

## 2021-03-15 RX ORDER — BLACK COHOSH ROOT 200 MG
1000 CAPSULE ORAL DAILY
COMMUNITY
Start: 2020-12-31

## 2021-03-15 RX ORDER — MAGNESIUM 250 MG
1 TABLET ORAL DAILY
COMMUNITY
Start: 2020-12-31 | End: 2022-02-09

## 2021-03-15 RX ORDER — CEPHRADINE 500 MG
1 CAPSULE ORAL 2 TIMES DAILY
COMMUNITY
Start: 2020-12-31

## 2021-03-15 RX ORDER — AMPICILLIN TRIHYDRATE 500 MG
1000 CAPSULE ORAL DAILY
COMMUNITY
Start: 2020-12-31 | End: 2021-07-19

## 2021-03-29 ENCOUNTER — PES CALL (OUTPATIENT)
Dept: ADMINISTRATIVE | Facility: CLINIC | Age: 71
End: 2021-03-29

## 2021-05-21 ENCOUNTER — PES CALL (OUTPATIENT)
Dept: ADMINISTRATIVE | Facility: CLINIC | Age: 71
End: 2021-05-21

## 2021-07-12 ENCOUNTER — LAB VISIT (OUTPATIENT)
Dept: LAB | Facility: HOSPITAL | Age: 71
End: 2021-07-12
Attending: INTERNAL MEDICINE
Payer: MEDICARE

## 2021-07-12 DIAGNOSIS — R60.0 LOCALIZED EDEMA: Primary | ICD-10-CM

## 2021-07-12 DIAGNOSIS — E78.2 MIXED HYPERLIPIDEMIA: ICD-10-CM

## 2021-07-12 DIAGNOSIS — E11.9 TYPE 2 DIABETES MELLITUS WITHOUT COMPLICATION, WITHOUT LONG-TERM CURRENT USE OF INSULIN: ICD-10-CM

## 2021-07-12 DIAGNOSIS — E55.9 VITAMIN D INSUFFICIENCY: ICD-10-CM

## 2021-07-12 DIAGNOSIS — E66.01 MORBID OBESITY: ICD-10-CM

## 2021-07-12 DIAGNOSIS — E03.9 HYPOTHYROIDISM, UNSPECIFIED TYPE: ICD-10-CM

## 2021-07-12 DIAGNOSIS — E78.49 OTHER HYPERLIPIDEMIA: ICD-10-CM

## 2021-07-12 DIAGNOSIS — J44.89 OBSTRUCTIVE CHRONIC BRONCHITIS WITHOUT EXACERBATION: ICD-10-CM

## 2021-07-12 LAB
25(OH)D3+25(OH)D2 SERPL-MCNC: 40 NG/ML (ref 30–96)
ALBUMIN SERPL BCP-MCNC: 3.7 G/DL (ref 3.5–5.2)
ALP SERPL-CCNC: 50 U/L (ref 55–135)
ALT SERPL W/O P-5'-P-CCNC: 23 U/L (ref 10–44)
ANION GAP SERPL CALC-SCNC: 8 MMOL/L (ref 8–16)
AST SERPL-CCNC: 26 U/L (ref 10–40)
BASOPHILS # BLD AUTO: 0.04 K/UL (ref 0–0.2)
BASOPHILS NFR BLD: 0.6 % (ref 0–1.9)
BILIRUB SERPL-MCNC: 0.4 MG/DL (ref 0.1–1)
BUN SERPL-MCNC: 11 MG/DL (ref 8–23)
CALCIUM SERPL-MCNC: 9.3 MG/DL (ref 8.7–10.5)
CHLORIDE SERPL-SCNC: 105 MMOL/L (ref 95–110)
CHOLEST SERPL-MCNC: 137 MG/DL (ref 120–199)
CHOLEST/HDLC SERPL: 1.9 {RATIO} (ref 2–5)
CO2 SERPL-SCNC: 26 MMOL/L (ref 23–29)
CREAT SERPL-MCNC: 0.8 MG/DL (ref 0.5–1.4)
DIFFERENTIAL METHOD: NORMAL
EOSINOPHIL # BLD AUTO: 0.1 K/UL (ref 0–0.5)
EOSINOPHIL NFR BLD: 1.9 % (ref 0–8)
ERYTHROCYTE [DISTWIDTH] IN BLOOD BY AUTOMATED COUNT: 13.1 % (ref 11.5–14.5)
EST. GFR  (AFRICAN AMERICAN): >60 ML/MIN/1.73 M^2
EST. GFR  (NON AFRICAN AMERICAN): >60 ML/MIN/1.73 M^2
ESTIMATED AVG GLUCOSE: 140 MG/DL (ref 68–131)
GLUCOSE SERPL-MCNC: 136 MG/DL (ref 70–110)
HBA1C MFR BLD: 6.5 % (ref 4–5.6)
HCT VFR BLD AUTO: 40.2 % (ref 37–48.5)
HDLC SERPL-MCNC: 73 MG/DL (ref 40–75)
HDLC SERPL: 53.3 % (ref 20–50)
HGB BLD-MCNC: 13.5 G/DL (ref 12–16)
IMM GRANULOCYTES # BLD AUTO: 0.02 K/UL (ref 0–0.04)
IMM GRANULOCYTES NFR BLD AUTO: 0.3 % (ref 0–0.5)
LDLC SERPL CALC-MCNC: 49.8 MG/DL (ref 63–159)
LYMPHOCYTES # BLD AUTO: 2.8 K/UL (ref 1–4.8)
LYMPHOCYTES NFR BLD: 44.8 % (ref 18–48)
MCH RBC QN AUTO: 29.7 PG (ref 27–31)
MCHC RBC AUTO-ENTMCNC: 33.6 G/DL (ref 32–36)
MCV RBC AUTO: 89 FL (ref 82–98)
MONOCYTES # BLD AUTO: 0.4 K/UL (ref 0.3–1)
MONOCYTES NFR BLD: 6.5 % (ref 4–15)
NEUTROPHILS # BLD AUTO: 2.9 K/UL (ref 1.8–7.7)
NEUTROPHILS NFR BLD: 45.9 % (ref 38–73)
NONHDLC SERPL-MCNC: 64 MG/DL
NRBC BLD-RTO: 0 /100 WBC
PLATELET # BLD AUTO: 244 K/UL (ref 150–450)
PMV BLD AUTO: 11.2 FL (ref 9.2–12.9)
POTASSIUM SERPL-SCNC: 4.1 MMOL/L (ref 3.5–5.1)
PROT SERPL-MCNC: 7.1 G/DL (ref 6–8.4)
RBC # BLD AUTO: 4.54 M/UL (ref 4–5.4)
SODIUM SERPL-SCNC: 139 MMOL/L (ref 136–145)
T3FREE SERPL-MCNC: 2.9 PG/ML (ref 2.3–4.2)
T4 FREE SERPL-MCNC: 1.02 NG/DL (ref 0.71–1.51)
TRIGL SERPL-MCNC: 71 MG/DL (ref 30–150)
TSH SERPL DL<=0.005 MIU/L-ACNC: 3.23 UIU/ML (ref 0.4–4)
WBC # BLD AUTO: 6.31 K/UL (ref 3.9–12.7)

## 2021-07-12 PROCEDURE — 84443 ASSAY THYROID STIM HORMONE: CPT | Performed by: INTERNAL MEDICINE

## 2021-07-12 PROCEDURE — 85025 COMPLETE CBC W/AUTO DIFF WBC: CPT | Performed by: INTERNAL MEDICINE

## 2021-07-12 PROCEDURE — 84481 FREE ASSAY (FT-3): CPT | Performed by: INTERNAL MEDICINE

## 2021-07-12 PROCEDURE — 83036 HEMOGLOBIN GLYCOSYLATED A1C: CPT | Performed by: INTERNAL MEDICINE

## 2021-07-12 PROCEDURE — 82306 VITAMIN D 25 HYDROXY: CPT | Performed by: INTERNAL MEDICINE

## 2021-07-12 PROCEDURE — 80061 LIPID PANEL: CPT | Mod: 59 | Performed by: INTERNAL MEDICINE

## 2021-07-12 PROCEDURE — 84439 ASSAY OF FREE THYROXINE: CPT | Performed by: INTERNAL MEDICINE

## 2021-07-12 PROCEDURE — 83704 LIPOPROTEIN BLD QUAN PART: CPT | Performed by: INTERNAL MEDICINE

## 2021-07-12 PROCEDURE — 36415 COLL VENOUS BLD VENIPUNCTURE: CPT | Mod: PO | Performed by: INTERNAL MEDICINE

## 2021-07-12 PROCEDURE — 80053 COMPREHEN METABOLIC PANEL: CPT | Performed by: INTERNAL MEDICINE

## 2021-07-16 ENCOUNTER — TELEPHONE (OUTPATIENT)
Dept: FAMILY MEDICINE | Facility: CLINIC | Age: 71
End: 2021-07-16

## 2021-07-16 LAB
CHOLEST SERPL-MCNC: 141 MG/DL
HDL SERPL QN: 9.2 NM
HDL SERPL-SCNC: 40.1 UMOL/L
HDLC SERPL-MCNC: 76 MG/DL (ref 40–59)
HLD.LARGE SERPL-SCNC: 8.2 UMOL/L
LDL SERPL QN: 20.6 NM
LDL SERPL-SCNC: 785 NMOL/L
LDL SMALL SERPL-SCNC: 394 NMOL/L
LDLC SERPL CALC-MCNC: 48 MG/DL
PATHOLOGY STUDY: ABNORMAL
TRIGL SERPL-MCNC: 83 MG/DL (ref 30–149)
VLDL LARGE SERPL-SCNC: <1.5 NMOL/L
VLDL SERPL QN: 48 NM

## 2021-07-19 ENCOUNTER — OFFICE VISIT (OUTPATIENT)
Dept: FAMILY MEDICINE | Facility: CLINIC | Age: 71
End: 2021-07-19
Payer: MEDICARE

## 2021-07-19 VITALS
HEART RATE: 80 BPM | BODY MASS INDEX: 33.04 KG/M2 | SYSTOLIC BLOOD PRESSURE: 120 MMHG | HEIGHT: 60 IN | DIASTOLIC BLOOD PRESSURE: 70 MMHG | WEIGHT: 168.31 LBS

## 2021-07-19 DIAGNOSIS — E03.9 HYPOTHYROIDISM, UNSPECIFIED TYPE: ICD-10-CM

## 2021-07-19 DIAGNOSIS — R03.0 PRE-HYPERTENSION: ICD-10-CM

## 2021-07-19 DIAGNOSIS — E11.9 TYPE 2 DIABETES MELLITUS WITHOUT COMPLICATION, WITHOUT LONG-TERM CURRENT USE OF INSULIN: ICD-10-CM

## 2021-07-19 DIAGNOSIS — E78.49 OTHER HYPERLIPIDEMIA: Primary | ICD-10-CM

## 2021-07-19 DIAGNOSIS — E55.9 VITAMIN D INSUFFICIENCY: ICD-10-CM

## 2021-07-19 DIAGNOSIS — M85.89 OSTEOPENIA OF MULTIPLE SITES: ICD-10-CM

## 2021-07-19 DIAGNOSIS — E66.9 CLASS 1 OBESITY WITH SERIOUS COMORBIDITY AND BODY MASS INDEX (BMI) OF 32.0 TO 32.9 IN ADULT, UNSPECIFIED OBESITY TYPE: ICD-10-CM

## 2021-07-19 DIAGNOSIS — Z01.89 ENCOUNTER FOR LABORATORY TEST: ICD-10-CM

## 2021-07-19 DIAGNOSIS — J45.20 MILD INTERMITTENT ASTHMA WITHOUT COMPLICATION: ICD-10-CM

## 2021-07-19 DIAGNOSIS — E04.2 MULTIPLE THYROID NODULES: ICD-10-CM

## 2021-07-19 PROCEDURE — 3044F HG A1C LEVEL LT 7.0%: CPT | Mod: CPTII,S$GLB,, | Performed by: INTERNAL MEDICINE

## 2021-07-19 PROCEDURE — 99215 PR OFFICE/OUTPT VISIT, EST, LEVL V, 40-54 MIN: ICD-10-PCS | Mod: S$GLB,,, | Performed by: INTERNAL MEDICINE

## 2021-07-19 PROCEDURE — 1159F MED LIST DOCD IN RCRD: CPT | Mod: CPTII,S$GLB,, | Performed by: INTERNAL MEDICINE

## 2021-07-19 PROCEDURE — 3008F BODY MASS INDEX DOCD: CPT | Mod: CPTII,S$GLB,, | Performed by: INTERNAL MEDICINE

## 2021-07-19 PROCEDURE — 1126F PR PAIN SEVERITY QUANTIFIED, NO PAIN PRESENT: ICD-10-PCS | Mod: CPTII,S$GLB,, | Performed by: INTERNAL MEDICINE

## 2021-07-19 PROCEDURE — 3288F PR FALLS RISK ASSESSMENT DOCUMENTED: ICD-10-PCS | Mod: CPTII,S$GLB,, | Performed by: INTERNAL MEDICINE

## 2021-07-19 PROCEDURE — 3288F FALL RISK ASSESSMENT DOCD: CPT | Mod: CPTII,S$GLB,, | Performed by: INTERNAL MEDICINE

## 2021-07-19 PROCEDURE — 99215 OFFICE O/P EST HI 40 MIN: CPT | Mod: S$GLB,,, | Performed by: INTERNAL MEDICINE

## 2021-07-19 PROCEDURE — 1101F PR PT FALLS ASSESS DOC 0-1 FALLS W/OUT INJ PAST YR: ICD-10-PCS | Mod: CPTII,S$GLB,, | Performed by: INTERNAL MEDICINE

## 2021-07-19 PROCEDURE — 1160F RVW MEDS BY RX/DR IN RCRD: CPT | Mod: CPTII,S$GLB,, | Performed by: INTERNAL MEDICINE

## 2021-07-19 PROCEDURE — 3044F PR MOST RECENT HEMOGLOBIN A1C LEVEL <7.0%: ICD-10-PCS | Mod: CPTII,S$GLB,, | Performed by: INTERNAL MEDICINE

## 2021-07-19 PROCEDURE — 1160F PR REVIEW ALL MEDS BY PRESCRIBER/CLIN PHARMACIST DOCUMENTED: ICD-10-PCS | Mod: CPTII,S$GLB,, | Performed by: INTERNAL MEDICINE

## 2021-07-19 PROCEDURE — 3074F SYST BP LT 130 MM HG: CPT | Mod: CPTII,S$GLB,, | Performed by: INTERNAL MEDICINE

## 2021-07-19 PROCEDURE — 3078F DIAST BP <80 MM HG: CPT | Mod: CPTII,S$GLB,, | Performed by: INTERNAL MEDICINE

## 2021-07-19 PROCEDURE — 99999 PR PBB SHADOW E&M-EST. PATIENT-LVL V: ICD-10-PCS | Mod: PBBFAC,,, | Performed by: INTERNAL MEDICINE

## 2021-07-19 PROCEDURE — 99999 PR PBB SHADOW E&M-EST. PATIENT-LVL V: CPT | Mod: PBBFAC,,, | Performed by: INTERNAL MEDICINE

## 2021-07-19 PROCEDURE — 1101F PT FALLS ASSESS-DOCD LE1/YR: CPT | Mod: CPTII,S$GLB,, | Performed by: INTERNAL MEDICINE

## 2021-07-19 PROCEDURE — 3074F PR MOST RECENT SYSTOLIC BLOOD PRESSURE < 130 MM HG: ICD-10-PCS | Mod: CPTII,S$GLB,, | Performed by: INTERNAL MEDICINE

## 2021-07-19 PROCEDURE — 1159F PR MEDICATION LIST DOCUMENTED IN MEDICAL RECORD: ICD-10-PCS | Mod: CPTII,S$GLB,, | Performed by: INTERNAL MEDICINE

## 2021-07-19 PROCEDURE — 3008F PR BODY MASS INDEX (BMI) DOCUMENTED: ICD-10-PCS | Mod: CPTII,S$GLB,, | Performed by: INTERNAL MEDICINE

## 2021-07-19 PROCEDURE — 1126F AMNT PAIN NOTED NONE PRSNT: CPT | Mod: CPTII,S$GLB,, | Performed by: INTERNAL MEDICINE

## 2021-07-19 PROCEDURE — 3078F PR MOST RECENT DIASTOLIC BLOOD PRESSURE < 80 MM HG: ICD-10-PCS | Mod: CPTII,S$GLB,, | Performed by: INTERNAL MEDICINE

## 2021-07-19 RX ORDER — PREDNISONE 10 MG/1
TABLET ORAL
Qty: 22 TABLET | Refills: 1 | Status: SHIPPED | OUTPATIENT
Start: 2021-07-19 | End: 2022-03-08

## 2021-07-19 RX ORDER — ALBUTEROL SULFATE 90 UG/1
2 AEROSOL, METERED RESPIRATORY (INHALATION) EVERY 6 HOURS PRN
Qty: 18 G | Refills: 3 | Status: SHIPPED | OUTPATIENT
Start: 2021-07-19

## 2021-08-01 ENCOUNTER — TELEPHONE (OUTPATIENT)
Dept: FAMILY MEDICINE | Facility: CLINIC | Age: 71
End: 2021-08-01

## 2021-08-03 ENCOUNTER — HOSPITAL ENCOUNTER (OUTPATIENT)
Dept: RADIOLOGY | Facility: HOSPITAL | Age: 71
Discharge: HOME | End: 2021-08-03
Attending: INTERNAL MEDICINE
Payer: MEDICARE

## 2021-08-03 DIAGNOSIS — E04.2 MULTIPLE THYROID NODULES: ICD-10-CM

## 2021-08-03 PROCEDURE — 76536 US EXAM OF HEAD AND NECK: CPT | Mod: TC,PO

## 2021-08-03 PROCEDURE — 76536 US SOFT TISSUE HEAD NECK THYROID: ICD-10-PCS | Mod: 26,,, | Performed by: RADIOLOGY

## 2021-08-03 PROCEDURE — 76536 US EXAM OF HEAD AND NECK: CPT | Mod: 26,,, | Performed by: RADIOLOGY

## 2021-08-11 ENCOUNTER — PES CALL (OUTPATIENT)
Dept: ADMINISTRATIVE | Facility: CLINIC | Age: 71
End: 2021-08-11

## 2021-08-18 ENCOUNTER — TELEPHONE (OUTPATIENT)
Dept: FAMILY MEDICINE | Facility: CLINIC | Age: 71
End: 2021-08-18

## 2021-09-07 ENCOUNTER — OFFICE VISIT (OUTPATIENT)
Dept: FAMILY MEDICINE | Facility: CLINIC | Age: 71
End: 2021-09-07
Payer: MEDICARE

## 2021-09-07 VITALS
HEART RATE: 85 BPM | SYSTOLIC BLOOD PRESSURE: 126 MMHG | HEIGHT: 60 IN | OXYGEN SATURATION: 95 % | WEIGHT: 169.56 LBS | BODY MASS INDEX: 33.29 KG/M2 | DIASTOLIC BLOOD PRESSURE: 76 MMHG

## 2021-09-07 DIAGNOSIS — E11.9 TYPE 2 DIABETES MELLITUS WITHOUT COMPLICATION, WITHOUT LONG-TERM CURRENT USE OF INSULIN: ICD-10-CM

## 2021-09-07 DIAGNOSIS — J45.20 MILD INTERMITTENT ASTHMA WITHOUT COMPLICATION: ICD-10-CM

## 2021-09-07 DIAGNOSIS — L03.012 CELLULITIS OF FINGER OF LEFT HAND: ICD-10-CM

## 2021-09-07 DIAGNOSIS — Z00.00 ENCOUNTER FOR PREVENTIVE HEALTH EXAMINATION: Primary | ICD-10-CM

## 2021-09-07 PROCEDURE — 1159F MED LIST DOCD IN RCRD: CPT | Mod: CPTII,S$GLB,, | Performed by: NURSE PRACTITIONER

## 2021-09-07 PROCEDURE — 99999 PR PBB SHADOW E&M-EST. PATIENT-LVL V: CPT | Mod: PBBFAC,,, | Performed by: NURSE PRACTITIONER

## 2021-09-07 PROCEDURE — G0439 PPPS, SUBSEQ VISIT: HCPCS | Mod: S$GLB,,, | Performed by: NURSE PRACTITIONER

## 2021-09-07 PROCEDURE — 3078F DIAST BP <80 MM HG: CPT | Mod: CPTII,S$GLB,, | Performed by: NURSE PRACTITIONER

## 2021-09-07 PROCEDURE — 3044F HG A1C LEVEL LT 7.0%: CPT | Mod: CPTII,S$GLB,, | Performed by: NURSE PRACTITIONER

## 2021-09-07 PROCEDURE — 1160F RVW MEDS BY RX/DR IN RCRD: CPT | Mod: CPTII,S$GLB,, | Performed by: NURSE PRACTITIONER

## 2021-09-07 PROCEDURE — 3008F BODY MASS INDEX DOCD: CPT | Mod: CPTII,S$GLB,, | Performed by: NURSE PRACTITIONER

## 2021-09-07 PROCEDURE — 3078F PR MOST RECENT DIASTOLIC BLOOD PRESSURE < 80 MM HG: ICD-10-PCS | Mod: CPTII,S$GLB,, | Performed by: NURSE PRACTITIONER

## 2021-09-07 PROCEDURE — 1160F PR REVIEW ALL MEDS BY PRESCRIBER/CLIN PHARMACIST DOCUMENTED: ICD-10-PCS | Mod: CPTII,S$GLB,, | Performed by: NURSE PRACTITIONER

## 2021-09-07 PROCEDURE — 99999 PR PBB SHADOW E&M-EST. PATIENT-LVL V: ICD-10-PCS | Mod: PBBFAC,,, | Performed by: NURSE PRACTITIONER

## 2021-09-07 PROCEDURE — 3288F FALL RISK ASSESSMENT DOCD: CPT | Mod: CPTII,S$GLB,, | Performed by: NURSE PRACTITIONER

## 2021-09-07 PROCEDURE — 1125F AMNT PAIN NOTED PAIN PRSNT: CPT | Mod: CPTII,S$GLB,, | Performed by: NURSE PRACTITIONER

## 2021-09-07 PROCEDURE — 3044F PR MOST RECENT HEMOGLOBIN A1C LEVEL <7.0%: ICD-10-PCS | Mod: CPTII,S$GLB,, | Performed by: NURSE PRACTITIONER

## 2021-09-07 PROCEDURE — 3008F PR BODY MASS INDEX (BMI) DOCUMENTED: ICD-10-PCS | Mod: CPTII,S$GLB,, | Performed by: NURSE PRACTITIONER

## 2021-09-07 PROCEDURE — 1159F PR MEDICATION LIST DOCUMENTED IN MEDICAL RECORD: ICD-10-PCS | Mod: CPTII,S$GLB,, | Performed by: NURSE PRACTITIONER

## 2021-09-07 PROCEDURE — G0439 PR MEDICARE ANNUAL WELLNESS SUBSEQUENT VISIT: ICD-10-PCS | Mod: S$GLB,,, | Performed by: NURSE PRACTITIONER

## 2021-09-07 PROCEDURE — 1125F PR PAIN SEVERITY QUANTIFIED, PAIN PRESENT: ICD-10-PCS | Mod: CPTII,S$GLB,, | Performed by: NURSE PRACTITIONER

## 2021-09-07 PROCEDURE — 3074F PR MOST RECENT SYSTOLIC BLOOD PRESSURE < 130 MM HG: ICD-10-PCS | Mod: CPTII,S$GLB,, | Performed by: NURSE PRACTITIONER

## 2021-09-07 PROCEDURE — 1101F PR PT FALLS ASSESS DOC 0-1 FALLS W/OUT INJ PAST YR: ICD-10-PCS | Mod: CPTII,S$GLB,, | Performed by: NURSE PRACTITIONER

## 2021-09-07 PROCEDURE — 1101F PT FALLS ASSESS-DOCD LE1/YR: CPT | Mod: CPTII,S$GLB,, | Performed by: NURSE PRACTITIONER

## 2021-09-07 PROCEDURE — 3074F SYST BP LT 130 MM HG: CPT | Mod: CPTII,S$GLB,, | Performed by: NURSE PRACTITIONER

## 2021-09-07 PROCEDURE — 3288F PR FALLS RISK ASSESSMENT DOCUMENTED: ICD-10-PCS | Mod: CPTII,S$GLB,, | Performed by: NURSE PRACTITIONER

## 2021-09-07 RX ORDER — CEPHALEXIN 500 MG/1
500 CAPSULE ORAL 4 TIMES DAILY
Qty: 20 CAPSULE | Refills: 0 | Status: SHIPPED | OUTPATIENT
Start: 2021-09-07 | End: 2022-03-08

## 2021-09-13 DIAGNOSIS — E11.9 TYPE 2 DIABETES MELLITUS WITHOUT COMPLICATION, WITHOUT LONG-TERM CURRENT USE OF INSULIN: ICD-10-CM

## 2021-09-14 DIAGNOSIS — E11.9 TYPE 2 DIABETES MELLITUS WITHOUT COMPLICATION, WITHOUT LONG-TERM CURRENT USE OF INSULIN: ICD-10-CM

## 2021-09-16 ENCOUNTER — TELEPHONE (OUTPATIENT)
Dept: FAMILY MEDICINE | Facility: CLINIC | Age: 71
End: 2021-09-16

## 2021-09-16 RX ORDER — METFORMIN HYDROCHLORIDE 500 MG/1
TABLET, EXTENDED RELEASE ORAL
Qty: 180 TABLET | Refills: 1 | Status: SHIPPED | OUTPATIENT
Start: 2021-09-16 | End: 2022-07-13

## 2022-01-05 DIAGNOSIS — E11.9 TYPE 2 DIABETES MELLITUS WITHOUT COMPLICATION, UNSPECIFIED WHETHER LONG TERM INSULIN USE: ICD-10-CM

## 2022-01-11 LAB
LEFT EYE DM RETINOPATHY: NEGATIVE
RIGHT EYE DM RETINOPATHY: NEGATIVE

## 2022-01-12 ENCOUNTER — LAB VISIT (OUTPATIENT)
Dept: LAB | Facility: HOSPITAL | Age: 72
End: 2022-01-12
Attending: INTERNAL MEDICINE
Payer: MEDICARE

## 2022-01-12 DIAGNOSIS — E11.9 TYPE 2 DIABETES MELLITUS WITHOUT COMPLICATION, WITHOUT LONG-TERM CURRENT USE OF INSULIN: ICD-10-CM

## 2022-01-12 DIAGNOSIS — E78.49 OTHER HYPERLIPIDEMIA: ICD-10-CM

## 2022-01-12 DIAGNOSIS — Z01.89 ENCOUNTER FOR LABORATORY TEST: ICD-10-CM

## 2022-01-12 DIAGNOSIS — E03.9 HYPOTHYROIDISM, UNSPECIFIED TYPE: ICD-10-CM

## 2022-01-12 DIAGNOSIS — E55.9 VITAMIN D INSUFFICIENCY: ICD-10-CM

## 2022-01-12 LAB
25(OH)D3+25(OH)D2 SERPL-MCNC: 73 NG/ML (ref 30–96)
ALBUMIN SERPL BCP-MCNC: 3.8 G/DL (ref 3.5–5.2)
ALP SERPL-CCNC: 51 U/L (ref 55–135)
ALT SERPL W/O P-5'-P-CCNC: 22 U/L (ref 10–44)
ANION GAP SERPL CALC-SCNC: 7 MMOL/L (ref 8–16)
AST SERPL-CCNC: 27 U/L (ref 10–40)
BILIRUB SERPL-MCNC: 0.4 MG/DL (ref 0.1–1)
BUN SERPL-MCNC: 11 MG/DL (ref 8–23)
CALCIUM SERPL-MCNC: 9.5 MG/DL (ref 8.7–10.5)
CHLORIDE SERPL-SCNC: 105 MMOL/L (ref 95–110)
CHOLEST SERPL-MCNC: 138 MG/DL (ref 120–199)
CHOLEST/HDLC SERPL: 1.8 {RATIO} (ref 2–5)
CO2 SERPL-SCNC: 27 MMOL/L (ref 23–29)
CREAT SERPL-MCNC: 0.8 MG/DL (ref 0.5–1.4)
EST. GFR  (AFRICAN AMERICAN): >60 ML/MIN/1.73 M^2
EST. GFR  (NON AFRICAN AMERICAN): >60 ML/MIN/1.73 M^2
ESTIMATED AVG GLUCOSE: 137 MG/DL (ref 68–131)
GLUCOSE SERPL-MCNC: 132 MG/DL (ref 70–110)
HBA1C MFR BLD: 6.4 % (ref 4–5.6)
HDLC SERPL-MCNC: 76 MG/DL (ref 40–75)
HDLC SERPL: 55.1 % (ref 20–50)
LDLC SERPL CALC-MCNC: 46 MG/DL (ref 63–159)
MAGNESIUM SERPL-MCNC: 1.6 MG/DL (ref 1.6–2.6)
NONHDLC SERPL-MCNC: 62 MG/DL
POTASSIUM SERPL-SCNC: 4.4 MMOL/L (ref 3.5–5.1)
PROT SERPL-MCNC: 7 G/DL (ref 6–8.4)
SODIUM SERPL-SCNC: 139 MMOL/L (ref 136–145)
T3FREE SERPL-MCNC: 2.7 PG/ML (ref 2.3–4.2)
T4 FREE SERPL-MCNC: 0.94 NG/DL (ref 0.71–1.51)
TRIGL SERPL-MCNC: 80 MG/DL (ref 30–150)
TSH SERPL DL<=0.005 MIU/L-ACNC: 3.79 UIU/ML (ref 0.4–4)

## 2022-01-12 PROCEDURE — 36415 COLL VENOUS BLD VENIPUNCTURE: CPT | Mod: HCNC,PO | Performed by: INTERNAL MEDICINE

## 2022-01-12 PROCEDURE — 83036 HEMOGLOBIN GLYCOSYLATED A1C: CPT | Mod: HCNC | Performed by: INTERNAL MEDICINE

## 2022-01-12 PROCEDURE — 80053 COMPREHEN METABOLIC PANEL: CPT | Mod: HCNC | Performed by: INTERNAL MEDICINE

## 2022-01-12 PROCEDURE — 83735 ASSAY OF MAGNESIUM: CPT | Mod: HCNC | Performed by: INTERNAL MEDICINE

## 2022-01-12 PROCEDURE — 82306 VITAMIN D 25 HYDROXY: CPT | Mod: HCNC | Performed by: INTERNAL MEDICINE

## 2022-01-12 PROCEDURE — 84439 ASSAY OF FREE THYROXINE: CPT | Mod: HCNC | Performed by: INTERNAL MEDICINE

## 2022-01-12 PROCEDURE — 84481 FREE ASSAY (FT-3): CPT | Mod: HCNC | Performed by: INTERNAL MEDICINE

## 2022-01-12 PROCEDURE — 84443 ASSAY THYROID STIM HORMONE: CPT | Mod: HCNC | Performed by: INTERNAL MEDICINE

## 2022-01-12 PROCEDURE — 80061 LIPID PANEL: CPT | Mod: HCNC | Performed by: INTERNAL MEDICINE

## 2022-02-09 ENCOUNTER — OFFICE VISIT (OUTPATIENT)
Dept: FAMILY MEDICINE | Facility: CLINIC | Age: 72
End: 2022-02-09
Payer: MEDICARE

## 2022-02-09 VITALS
WEIGHT: 171.75 LBS | BODY MASS INDEX: 33.72 KG/M2 | DIASTOLIC BLOOD PRESSURE: 72 MMHG | HEART RATE: 82 BPM | SYSTOLIC BLOOD PRESSURE: 132 MMHG | HEIGHT: 60 IN

## 2022-02-09 DIAGNOSIS — E11.9 TYPE 2 DIABETES MELLITUS WITHOUT COMPLICATION, WITHOUT LONG-TERM CURRENT USE OF INSULIN: ICD-10-CM

## 2022-02-09 DIAGNOSIS — E66.9 CLASS 1 OBESITY WITH SERIOUS COMORBIDITY AND BODY MASS INDEX (BMI) OF 33.0 TO 33.9 IN ADULT, UNSPECIFIED OBESITY TYPE: ICD-10-CM

## 2022-02-09 DIAGNOSIS — E78.49 OTHER HYPERLIPIDEMIA: ICD-10-CM

## 2022-02-09 DIAGNOSIS — E03.9 HYPOTHYROIDISM, UNSPECIFIED TYPE: ICD-10-CM

## 2022-02-09 DIAGNOSIS — E83.42 HYPOMAGNESEMIA: ICD-10-CM

## 2022-02-09 DIAGNOSIS — J45.21 MILD INTERMITTENT ASTHMA WITH ACUTE EXACERBATION: Primary | ICD-10-CM

## 2022-02-09 DIAGNOSIS — J30.89 PERENNIAL ALLERGIC RHINITIS WITH SEASONAL VARIATION: ICD-10-CM

## 2022-02-09 DIAGNOSIS — J30.2 PERENNIAL ALLERGIC RHINITIS WITH SEASONAL VARIATION: ICD-10-CM

## 2022-02-09 DIAGNOSIS — Z01.89 ENCOUNTER FOR LABORATORY TEST: ICD-10-CM

## 2022-02-09 PROCEDURE — 3044F PR MOST RECENT HEMOGLOBIN A1C LEVEL <7.0%: ICD-10-PCS | Mod: HCNC,CPTII,S$GLB, | Performed by: INTERNAL MEDICINE

## 2022-02-09 PROCEDURE — 3078F PR MOST RECENT DIASTOLIC BLOOD PRESSURE < 80 MM HG: ICD-10-PCS | Mod: HCNC,CPTII,S$GLB, | Performed by: INTERNAL MEDICINE

## 2022-02-09 PROCEDURE — 99999 PR PBB SHADOW E&M-EST. PATIENT-LVL V: ICD-10-PCS | Mod: PBBFAC,HCNC,, | Performed by: INTERNAL MEDICINE

## 2022-02-09 PROCEDURE — 99499 RISK ADDL DX/OHS AUDIT: ICD-10-PCS | Mod: HCNC,S$GLB,, | Performed by: INTERNAL MEDICINE

## 2022-02-09 PROCEDURE — 3078F DIAST BP <80 MM HG: CPT | Mod: HCNC,CPTII,S$GLB, | Performed by: INTERNAL MEDICINE

## 2022-02-09 PROCEDURE — 99499 UNLISTED E&M SERVICE: CPT | Mod: HCNC,S$GLB,, | Performed by: INTERNAL MEDICINE

## 2022-02-09 PROCEDURE — 99999 PR PBB SHADOW E&M-EST. PATIENT-LVL V: CPT | Mod: PBBFAC,HCNC,, | Performed by: INTERNAL MEDICINE

## 2022-02-09 PROCEDURE — 3008F PR BODY MASS INDEX (BMI) DOCUMENTED: ICD-10-PCS | Mod: HCNC,CPTII,S$GLB, | Performed by: INTERNAL MEDICINE

## 2022-02-09 PROCEDURE — 99214 OFFICE O/P EST MOD 30 MIN: CPT | Mod: HCNC,S$GLB,, | Performed by: INTERNAL MEDICINE

## 2022-02-09 PROCEDURE — 1159F PR MEDICATION LIST DOCUMENTED IN MEDICAL RECORD: ICD-10-PCS | Mod: HCNC,CPTII,S$GLB, | Performed by: INTERNAL MEDICINE

## 2022-02-09 PROCEDURE — 3075F SYST BP GE 130 - 139MM HG: CPT | Mod: HCNC,CPTII,S$GLB, | Performed by: INTERNAL MEDICINE

## 2022-02-09 PROCEDURE — 1101F PT FALLS ASSESS-DOCD LE1/YR: CPT | Mod: HCNC,CPTII,S$GLB, | Performed by: INTERNAL MEDICINE

## 2022-02-09 PROCEDURE — 1160F PR REVIEW ALL MEDS BY PRESCRIBER/CLIN PHARMACIST DOCUMENTED: ICD-10-PCS | Mod: HCNC,CPTII,S$GLB, | Performed by: INTERNAL MEDICINE

## 2022-02-09 PROCEDURE — 3288F PR FALLS RISK ASSESSMENT DOCUMENTED: ICD-10-PCS | Mod: HCNC,CPTII,S$GLB, | Performed by: INTERNAL MEDICINE

## 2022-02-09 PROCEDURE — 1125F AMNT PAIN NOTED PAIN PRSNT: CPT | Mod: HCNC,CPTII,S$GLB, | Performed by: INTERNAL MEDICINE

## 2022-02-09 PROCEDURE — 3044F HG A1C LEVEL LT 7.0%: CPT | Mod: HCNC,CPTII,S$GLB, | Performed by: INTERNAL MEDICINE

## 2022-02-09 PROCEDURE — 3008F BODY MASS INDEX DOCD: CPT | Mod: HCNC,CPTII,S$GLB, | Performed by: INTERNAL MEDICINE

## 2022-02-09 PROCEDURE — 3075F PR MOST RECENT SYSTOLIC BLOOD PRESS GE 130-139MM HG: ICD-10-PCS | Mod: HCNC,CPTII,S$GLB, | Performed by: INTERNAL MEDICINE

## 2022-02-09 PROCEDURE — 1125F PR PAIN SEVERITY QUANTIFIED, PAIN PRESENT: ICD-10-PCS | Mod: HCNC,CPTII,S$GLB, | Performed by: INTERNAL MEDICINE

## 2022-02-09 PROCEDURE — 99214 PR OFFICE/OUTPT VISIT, EST, LEVL IV, 30-39 MIN: ICD-10-PCS | Mod: HCNC,S$GLB,, | Performed by: INTERNAL MEDICINE

## 2022-02-09 PROCEDURE — 1159F MED LIST DOCD IN RCRD: CPT | Mod: HCNC,CPTII,S$GLB, | Performed by: INTERNAL MEDICINE

## 2022-02-09 PROCEDURE — 3288F FALL RISK ASSESSMENT DOCD: CPT | Mod: HCNC,CPTII,S$GLB, | Performed by: INTERNAL MEDICINE

## 2022-02-09 PROCEDURE — 1101F PR PT FALLS ASSESS DOC 0-1 FALLS W/OUT INJ PAST YR: ICD-10-PCS | Mod: HCNC,CPTII,S$GLB, | Performed by: INTERNAL MEDICINE

## 2022-02-09 PROCEDURE — 1160F RVW MEDS BY RX/DR IN RCRD: CPT | Mod: HCNC,CPTII,S$GLB, | Performed by: INTERNAL MEDICINE

## 2022-02-09 RX ORDER — LANOLIN ALCOHOL/MO/W.PET/CERES
400 CREAM (GRAM) TOPICAL DAILY
Qty: 90 TABLET | Refills: 1 | Status: SHIPPED | OUTPATIENT
Start: 2022-02-09

## 2022-02-09 RX ORDER — AZELASTINE 1 MG/ML
SPRAY, METERED NASAL
Qty: 30 ML | Refills: 2 | Status: SHIPPED | OUTPATIENT
Start: 2022-02-09 | End: 2024-01-11 | Stop reason: SDUPTHER

## 2022-02-09 RX ORDER — LORATADINE 10 MG/1
10 TABLET ORAL DAILY PRN
Qty: 90 TABLET | Refills: 1 | Status: SHIPPED | OUTPATIENT
Start: 2022-02-09 | End: 2023-02-09

## 2022-02-09 NOTE — PATIENT INSTRUCTIONS
Mild intermittent asthma with acute exacerbation; increase advair to 2 puffs 2x a day; rescue as needed.     Perennial allergic rhinitis with seasonal variation  -     azelastine (ASTELIN) 137 mcg (0.1 %) nasal spray; 1 spray 2x a day as needed for nasal congestion; generic please  Dispense: 30 mL; Refill: 2  -     loratadine (CLARITIN) 10 mg tablet; Take 1 tablet (10 mg total) by mouth daily as needed for Allergies (congestion or post nasal drip). Generics please  Dispense: 90 tablet; Refill: 1    Hypomagnesemia  -     magnesium oxide (MAG-OX) 400 mg (241.3 mg magnesium) tablet; Take 1 tablet (400 mg total) by mouth once daily.  Dispense: 90 tablet; Refill: 1    Other hyperlipidemia; Maintain low fat high fiber diet, exercise regularly. Weight reduction where indicated. Decrease crestor to 6x a week    Type 2 diabetes mellitus without complication, without long-term current use of insulin; Maintain a low carb diet; monitor CBG's at home; keep a log for review.    Hypothyroidism, unspecified type: wants to wait on thyroid med for now; will follow.     BMI 33.0-33.9,adult; Caloric restriction w regular exercise and weight reduction.    Encounter for lab test: labs reviewed and ordered for f/u.

## 2022-02-09 NOTE — TELEPHONE ENCOUNTER
No new care gaps identified.  Powered by [facility] [employer]. Reference number: [id number].   2/09/2022 3:54:51 PM CST

## 2022-02-09 NOTE — PROGRESS NOTES
Subjective:       Patient ID: Breanna Silva is a 71 y.o. female.    Chief Complaint: Results (6 mo f/u Lab done)    HPI:  Here for reassessment as well as review of her lab work.  Mild intermittent asthma with acute exacerbation; increase advair to 2 puffs 2x a day; rescue as needed.  Use rescue inhaler 2 times last month; acting up lately with her allergies; has rescue inhaler to use if needed.  Taking Advair 3-4 times per week advised to take it as 2 puffs twice a day around the clock.  Perennial allergic rhinitis with seasonal variation  -     azelastine (ASTELIN) 137 mcg (0.1 %) nasal spray; 1 spray 2x a day as needed for nasal congestion; generic please  Dispense: 30 mL; Refill: 2  -     loratadine (CLARITIN) 10 mg tablet; Take 1 tablet (10 mg total) by mouth daily as needed for Allergies (congestion or post nasal drip). Generics please  Dispense: 90 tablet; Refill: 1  Hypomagnesemia:  Magnesium level returned back 1.6  -     magnesium oxide (MAG-OX) 400 mg (241.3 mg magnesium) tablet; Take 1 tablet (400 mg total) by mouth once daily.  Dispense: 90 tablet; Refill: 1  Other hyperlipidemia; Maintain low fat high fiber diet, exercise regularly. Weight reduction where indicated. Decrease crestor 10 mg to 6x a week.  Lipid profile:  Cholesterol 138/triglyceride 80/HDL 76/LDL 46 goal less than 70 with diabetes mellitus  Type 2 diabetes mellitus without complication, without long-term current use of insulin; Maintain a low carb diet; monitor CBG's at home; keep a log for review.  A1c 6.4 previously was 6.5; fasting blood sugar 132. At home blood sugars been running 125-130.  Hypothyroidism, unspecified type: wa free T4 0.94 and free T3 2.7.  nts to wait on thyroid med for now; will follow.  TSH 3.78 up from 3.23.  Wants to stay off thyroid medications.  Obesity with BMI 33.0-33.9,adult; BMI 33.54.  Caloric restriction w regular exercise and weight reduction.  Encounter for lab test: labs reviewed and ordered for  f/u.  Vitamin-D level returned back high normal at 73 at 5000 IU per day will change to 5000 IU use every other day.  Total time:  1:57 p.m. through 2:42 p.m. greater than 50% of the time spent in discussion, counseling, and review; medications reviewed and addressed and prescribed.  Various different diagnosis is were covered as well as plan of care.          Review of Systems   Constitutional: Negative for appetite change and fever.   HENT: Negative for congestion, postnasal drip, rhinorrhea and sinus pressure.    Eyes: Negative for discharge and itching.   Respiratory: Negative for cough, chest tightness, shortness of breath and wheezing.    Cardiovascular: Negative for chest pain, palpitations and leg swelling.   Gastrointestinal: Negative for abdominal distention, abdominal pain, blood in stool, constipation, diarrhea, nausea and vomiting.   Endocrine: Negative for polydipsia, polyphagia and polyuria.   Genitourinary: Negative for dysuria and hematuria.   Musculoskeletal: Negative for arthralgias and myalgias.   Skin: Negative for rash.   Allergic/Immunologic: Negative for environmental allergies and food allergies.   Neurological: Negative for tremors, seizures and syncope.   Hematological: Negative for adenopathy. Does not bruise/bleed easily.   Psychiatric/Behavioral: Negative for dysphoric mood. The patient is not nervous/anxious.       Objective:        Vitals:    02/09/22 1305   BP: 132/72   BP Location: Left arm   Pulse: 82   Weight: 77.9 kg (171 lb 11.8 oz)   Height: 5' (1.524 m)       BMI Readings from Last 3 Encounters:   02/09/22 33.54 kg/m²   09/15/21 33.01 kg/m²   09/07/21 33.11 kg/m²        Wt Readings from Last 3 Encounters:   02/09/22 1305 77.9 kg (171 lb 11.8 oz)   09/15/21 1507 76.7 kg (169 lb)   09/07/21 1321 76.9 kg (169 lb 8.5 oz)        BP Readings from Last 3 Encounters:   02/09/22 132/72   09/07/21 126/76   07/19/21 120/70        There are no preventive care reminders to display for this  patient.     Health Maintenance Due   Topic Date Due    COVID-19 Vaccine (1) Never done    Shingles Vaccine (1 of 2) Never done    Diabetes Urine Screening  2020    Eye Exam  2021         Past Medical History:   Diagnosis Date    Age-related osteoporosis without current pathological fracture     Asthma     Diabetes mellitus, type 2     w wt loss off metformin now; diet controlled.    Hypothyroidism     Mild intermittent asthma without complication     on Xolair inj monthly; allergist Dr Nelson.    Osteopenia     Osteopenia     Pre-hypertension     suspected white coat syndrome; also elevated w seeing ortho x2 for pain.    Proteinuria     Pure hypercholesterolemia     Thyroid nodule     Vitamin D insufficiency        Past Surgical History:   Procedure Laterality Date     SECTION      x3    COLONOSCOPY      Dr Butler: 3-4 polyps removed; five-year follow-up recommended    NECK SURGERY  1989    fusion C5-6; 2 discs removed as well.    TOTAL HIP ARTHROPLASTY Right     ; avascular necrosis.    TUBAL LIGATION         Social History     Tobacco Use    Smoking status: Former Smoker     Packs/day: 1.00     Years: 30.00     Pack years: 30.00     Quit date:      Years since quittin.1    Smokeless tobacco: Never Used   Substance Use Topics    Alcohol use: No     Alcohol/week: 0.0 standard drinks    Drug use: No       Family History   Problem Relation Age of Onset    Arthritis Mother     Cancer Mother         lung ca; smoking.    COPD Mother     Diabetes Maternal Grandmother     Heart disease Maternal Grandfather        Review of patient's allergies indicates:   Allergen Reactions    Flu vaccine -(3 yr+)(pf) Anaphylaxis      Flu vaccine  pneumonitis & in hospital for 1 month/ ICU for 1 week    Byetta [exenatide]     Metformin      diarrhea    Pravastatin sodium      elev LFT    Prinivil [lisinopril]      Cough      Avelox  [moxifloxacin] Rash       Current Outpatient Medications on File Prior to Visit   Medication Sig Dispense Refill    acetylcarnitine 500 mg Cap 250 mg by mouth twice a day      albuterol (VENTOLIN HFA) 90 mcg/actuation inhaler Inhale 2 puffs into the lungs every 6 (six) hours as needed for Wheezing or Shortness of Breath. Rescue 18 g 3    alendronate (FOSAMAX) 35 MG tablet Take 1 tablet by mouth once a week 15 tablet 3    alpha lipoic acid 200 mg Cap Take 1 capsule by mouth 2 (two) times daily.      biotin 1 mg tablet Take 1,000 mcg by mouth once daily.      blood sugar diagnostic (TRUE METRIX GLUCOSE TEST STRIP) Strp Check blood sugar levels 1-2 times daily 180 strip 1    CALCIUM 500 500 mg calcium (1,250 mg) tablet Take 1 tablet by mouth 2 (two) times daily.      CALCIUM CARBONATE/VITAMIN D3 (CALCIUM 600 + D,3, ORAL) Take 600 mg by mouth 2 (two) times daily.      cephALEXin (KEFLEX) 500 MG capsule Take 1 capsule (500 mg total) by mouth 4 (four) times daily. 20 capsule 0    CLARITIN 10 mg tablet Take 10 mg by mouth daily as needed.       coenzyme Q10 200 mg capsule Take 200 mg by mouth once daily.      desonide (DESOWEN) 0.05 % cream APPLY TO AFFECTED AREA ON FACE TWICE DAILY AS NEEDED FOR 2 WEEKS ONLY      fluticasone-salmeterol 230-21 mcg/dose (ADVAIR HFA) 230-21 mcg/actuation HFAA inhaler Inhale 2 puffs into the lungs 2 (two) times daily. Controller 12 g 2    LUTEIN ORAL Take by mouth once daily at 6am.      meloxicam (MOBIC) 15 MG tablet Take 1 tablet (15 mg total) by mouth once daily. 30 tablet 4    metFORMIN (GLUCOPHAGE-XR) 500 MG ER 24hr tablet TAKE 1 TABLET BY MOUTH TWICE DAILY WITH MEALS 180 tablet 1    multivitamin (ONE DAILY MULTIVITAMIN) per tablet Take 1 tablet by mouth once daily.      predniSONE (DELTASONE) 10 MG tablet 3 tablets by mouth for 3 days then 2 tablets for 4 days then 1 tablet for 5 days. (Patient taking differently: 3 tablets by mouth for 3 days then 2 tablets for 4 days  then 1 tablet for 5 days. As needed for flare ups.) 22 tablet 1    rosuvastatin (CRESTOR) 10 MG tablet Take 1 tablet (10 mg total) by mouth every evening. 90 tablet 3    tiZANidine (ZANAFLEX) 4 MG tablet Take 1 tablet (4 mg total) by mouth every 12 (twelve) hours as needed (muscle spasms/ pain). 40 tablet 0    torsemide (DEMADEX) 20 MG Tab 20 mg po daily as needed for fluid 30 tablet 1    TRUE METRIX AIR GLUCOSE METER [facility] [place] AS DIRECTED 1 each 0    UNABLE TO FIND AG Pro - 2 pills twice a day      VITAMIN C 1000 MG tablet Take 1,000 mg by mouth once daily.       No current facility-administered medications on file prior to visit.       Physical Exam  Vitals reviewed.   Constitutional:       Appearance: She is well-developed and well-nourished.   HENT:      Head: Normocephalic and atraumatic.   Eyes:      Extraocular Movements: EOM normal.   Neck:      Thyroid: No thyromegaly.   Cardiovascular:      Rate and Rhythm: Normal rate and regular rhythm.      Heart sounds: Normal heart sounds. No murmur heard.  No gallop.    Pulmonary:      Effort: Pulmonary effort is normal. No respiratory distress.      Breath sounds: Normal breath sounds. No wheezing or rales.      Comments: Tr scattered wheezing mild.   Abdominal:      General: Bowel sounds are normal. There is no distension.      Palpations: Abdomen is soft.      Tenderness: There is no abdominal tenderness. There is no guarding or rebound.   Musculoskeletal:         General: No edema. Normal range of motion.      Cervical back: Normal range of motion and neck supple.      Right lower leg: No edema.      Left lower leg: No edema.   Lymphadenopathy:      Cervical: No cervical adenopathy.   Skin:     Findings: No rash.   Neurological:      Mental Status: She is alert and oriented to person, place, and time.      Comments: Moves all 4 extremities fine.   Psychiatric:         Mood and Affect: Mood and affect normal.         Behavior: Behavior normal.         Thought  Content: Thought content normal.         Assessment:       1. Mild intermittent asthma with acute exacerbation    2. Perennial allergic rhinitis with seasonal variation    3. Hypomagnesemia    4. Other hyperlipidemia    5. Type 2 diabetes mellitus without complication, without long-term current use of insulin    6. Hypothyroidism, unspecified type    7. Class 1 obesity with serious comorbidity and body mass index (BMI) of 33.0 to 33.9 in adult, unspecified obesity type    8. Encounter for laboratory test        Plan:       Mild intermittent asthma with acute exacerbation; increase advair to 2 puffs 2x a day; rescue as needed.     Perennial allergic rhinitis with seasonal variation  -     azelastine (ASTELIN) 137 mcg (0.1 %) nasal spray; 1 spray 2x a day as needed for nasal congestion; generic please  Dispense: 30 mL; Refill: 2  -     loratadine (CLARITIN) 10 mg tablet; Take 1 tablet (10 mg total) by mouth daily as needed for Allergies (congestion or post nasal drip). Generics please  Dispense: 90 tablet; Refill: 1    Hypomagnesemia  -     magnesium oxide (MAG-OX) 400 mg (241.3 mg magnesium) tablet; Take 1 tablet (400 mg total) by mouth once daily.  Dispense: 90 tablet; Refill: 1    Other hyperlipidemia; Maintain low fat high fiber diet, exercise regularly. Weight reduction where indicated. Decrease crestor to 6x a week    Type 2 diabetes mellitus without complication, without long-term current use of insulin; CMP with hemoglobin A1c on follow-up. Maintain a low carb diet; monitor CBG's at home; keep a log for review.  Continued attempts at weight reduction    Hypothyroidism, unspecified type: wants to wait on thyroid med for now; will follow.     Obesity class 1 with BMI 33.0-33.9,adult; BMI 33.54.  Caloric restriction w regular exercise and weight reduction.    Encounter for lab test: labs reviewed and ordered for f/u.

## 2022-02-13 PROBLEM — J30.2 PERENNIAL ALLERGIC RHINITIS WITH SEASONAL VARIATION: Status: ACTIVE | Noted: 2022-02-13

## 2022-02-13 PROBLEM — E83.42 HYPOMAGNESEMIA: Status: ACTIVE | Noted: 2022-02-13

## 2022-02-13 PROBLEM — Z01.89 ENCOUNTER FOR LABORATORY TEST: Status: ACTIVE | Noted: 2022-02-13

## 2022-02-13 PROBLEM — J30.89 PERENNIAL ALLERGIC RHINITIS WITH SEASONAL VARIATION: Status: ACTIVE | Noted: 2022-02-13

## 2022-03-01 DIAGNOSIS — E78.49 OTHER HYPERLIPIDEMIA: ICD-10-CM

## 2022-03-01 DIAGNOSIS — E11.9 TYPE 2 DIABETES MELLITUS WITHOUT COMPLICATION, WITHOUT LONG-TERM CURRENT USE OF INSULIN: ICD-10-CM

## 2022-03-02 ENCOUNTER — TELEPHONE (OUTPATIENT)
Dept: FAMILY MEDICINE | Facility: CLINIC | Age: 72
End: 2022-03-02

## 2022-03-02 NOTE — TELEPHONE ENCOUNTER
----- Message from Malik Moody sent at 3/2/2022  8:08 AM CST -----  Regarding: appointment  Contact: self  Type:  Same Day Appointment Request    Caller is requesting a same day appointment.  Caller declined first available appointment listed below.      Name of Caller:  self  When is the first available appointment?  03/24/2022  Symptoms:  cough, wheezing  Best Call Back Number:  [phone]  Additional Information:

## 2022-03-02 NOTE — TELEPHONE ENCOUNTER
Spoke with pt. States that she is having asthma flare up.   Recently completed round of abx for chest cold. Would like to know if she should have another round of abx to clear up congestion in chest.   Please advise.

## 2022-03-04 RX ORDER — ROSUVASTATIN CALCIUM 10 MG/1
TABLET, COATED ORAL
Qty: 90 TABLET | Refills: 3 | Status: SHIPPED | OUTPATIENT
Start: 2022-03-04 | End: 2024-01-11 | Stop reason: SDUPTHER

## 2022-03-04 NOTE — TELEPHONE ENCOUNTER
No new care gaps identified.  Powered by [employer]. Reference number: [id number].   3/04/2022 12:59:42 PM CST

## 2022-03-04 NOTE — TELEPHONE ENCOUNTER
Refill Authorization Note   Breanna Silva  is requesting a refill authorization.  Brief Assessment and Rationale for Refill:  Approve     Medication Therapy Plan:       Medication Reconciliation Completed: No   Comments:   --->Care Gap information included below if applicable.   Orders Placed This Encounter    rosuvastatin (CRESTOR) 10 MG tablet      Requested Prescriptions   Signed Prescriptions Disp Refills    rosuvastatin (CRESTOR) 10 MG tablet 90 tablet 3     Sig: TAKE 1 TABLET BY MOUTH ONCE DAILY IN THE EVENING       [facility] CoA Reductase Inhibitors Protocol Passed - 3/4/2022  1:08 PM        Passed - Patient not currently pregnant        Passed - No positive pregnancy test in past 12 months         Passed - Recent or future visit with authorizing provider        Passed - Lipid Panel within past 12 months     Lab Results   Component Value Date    CHOL 138 01/12/2022    HDL 76 (H) 01/12/2022    LDLCALC 46.0 (L) 01/12/2022    TRIG 80 01/12/2022             Passed - ALT less than 95 in past 12 months      Lab Results   Component Value Date    ALT 22 01/12/2022    ALT 23 07/12/2021    ALT 31 03/08/2021             Cardiovascular:  Antilipid - Statins Passed - 3/4/2022  1:08 PM        Passed - Patient is at least 18 years old        Passed - Valid encounter within last 15 months     Recent Visits  Date Type Provider Dept   02/09/22 Office Visit Nguyễn Nicholas MD [facility]   07/19/21 Office Visit Nguyễn Nicholas MD [facility]   03/15/21 Office Visit Nguyễn Nicholas MD [facility]   12/14/20 Office Visit Nguyễn Nicholas MD [facility]   10/12/20 Office Visit Nguyễn Nicholas MD [facility]   07/06/20 Office Visit Nguyễn Nicholas MD [facility]   Showing recent visits within past 720 days and meeting all other requirements  Future Appointments  No visits were found meeting these conditions.  Showing future appointments within next 150 days and meeting all other  requirements      Future Appointments              In 3 months LAB, [facility]    In 3 months SPECIMEN, [facility] - [facility]    In 3 months Nguyễn Nicholas MD [facility]                Passed - ALT is 131 or below and within 360 days     ALT   Date Value Ref Range Status   01/12/2022 22 10 - 44 U/L Final   07/12/2021 23 10 - 44 U/L Final   03/08/2021 31 10 - 44 U/L Final              Passed - AST is 119 or below and within 360 days     AST   Date Value Ref Range Status   01/12/2022 27 10 - 40 U/L Final   07/12/2021 26 10 - 40 U/L Final   03/08/2021 29 10 - 40 U/L Final              Passed - Total Cholesterol within 360 days     Lab Results   Component Value Date    CHOL 138 01/12/2022    CHOL 137 07/12/2021    CHOL 146 03/08/2021    LIPIDTOTCHOL 141 07/12/2021              Passed - LDL within 360 days     LDL Cholesterol   Date Value Ref Range Status   01/12/2022 46.0 (L) 63.0 - 159.0 mg/dL Final     Comment:     The National Cholesterol Education Program (NCEP) has set the  following guidelines (reference values) for LDL Cholesterol:  Optimal.......................<130 mg/dL  Borderline High...............130-159 mg/dL  High..........................160-189 mg/dL  Very High.....................>190 mg/dL       Lipids, LDL Cholesterol   Date Value Ref Range Status   07/12/2021 48 <=129 mg/dL Final            Passed - HDL within 360 days     HDL   Date Value Ref Range Status   01/12/2022 76 (H) 40 - 75 mg/dL Final     Comment:     The National Cholesterol Education Program (NCEP) has set the  following guidelines (reference values) for HDL Cholesterol:  Low...............<40 mg/dL  Optimal...........>60 mg/dL       Lipids, HDL Cholesterol   Date Value Ref Range Status   07/12/2021 76 (H) 40 - 59 mg/dL Final            Passed - Triglycerides within 360 days     Lab Results   Component Value Date    LIPIDTRIG 83 07/12/2021    TRIG 80  01/12/2022    TRIG 71 07/12/2021    TRIG 62 03/08/2021                  Appointments  past 12m or future 3m with PCP    Date Provider   Last Visit   2/9/2022 Nguyễn Nicholas MD   Next Visit   3/2/2022 Nguyễn Nicholas MD   ED visits in past 90 days: 0     Note composed:1:10 PM 03/04/2022

## 2022-03-08 ENCOUNTER — OFFICE VISIT (OUTPATIENT)
Dept: FAMILY MEDICINE | Facility: CLINIC | Age: 72
End: 2022-03-08
Payer: MEDICARE

## 2022-03-08 VITALS
WEIGHT: 169.75 LBS | SYSTOLIC BLOOD PRESSURE: 140 MMHG | HEIGHT: 60 IN | BODY MASS INDEX: 33.33 KG/M2 | DIASTOLIC BLOOD PRESSURE: 80 MMHG

## 2022-03-08 DIAGNOSIS — J45.20 MILD INTERMITTENT ASTHMA WITHOUT COMPLICATION: Primary | ICD-10-CM

## 2022-03-08 DIAGNOSIS — E11.9 TYPE 2 DIABETES MELLITUS WITHOUT COMPLICATION, WITHOUT LONG-TERM CURRENT USE OF INSULIN: ICD-10-CM

## 2022-03-08 PROBLEM — Z01.89 ENCOUNTER FOR LABORATORY TEST: Status: RESOLVED | Noted: 2022-02-13 | Resolved: 2022-03-08

## 2022-03-08 PROBLEM — M85.80 OSTEOPENIA: Status: ACTIVE | Noted: 2018-12-10

## 2022-03-08 PROBLEM — N60.12 DIFFUSE CYSTIC MASTOPATHY OF BOTH BREASTS: Status: ACTIVE | Noted: 2019-12-16

## 2022-03-08 PROBLEM — N95.2 ATROPHIC VAGINITIS: Status: ACTIVE | Noted: 2018-12-10

## 2022-03-08 PROBLEM — Z78.0 MENOPAUSE: Status: ACTIVE | Noted: 2018-12-10

## 2022-03-08 PROBLEM — N60.11 DIFFUSE CYSTIC MASTOPATHY OF BOTH BREASTS: Status: ACTIVE | Noted: 2019-12-16

## 2022-03-08 PROCEDURE — 1159F MED LIST DOCD IN RCRD: CPT | Mod: CPTII,S$GLB,, | Performed by: FAMILY MEDICINE

## 2022-03-08 PROCEDURE — 3008F BODY MASS INDEX DOCD: CPT | Mod: CPTII,S$GLB,, | Performed by: FAMILY MEDICINE

## 2022-03-08 PROCEDURE — 1126F PR PAIN SEVERITY QUANTIFIED, NO PAIN PRESENT: ICD-10-PCS | Mod: CPTII,S$GLB,, | Performed by: FAMILY MEDICINE

## 2022-03-08 PROCEDURE — 1160F RVW MEDS BY RX/DR IN RCRD: CPT | Mod: CPTII,S$GLB,, | Performed by: FAMILY MEDICINE

## 2022-03-08 PROCEDURE — 3044F PR MOST RECENT HEMOGLOBIN A1C LEVEL <7.0%: ICD-10-PCS | Mod: CPTII,S$GLB,, | Performed by: FAMILY MEDICINE

## 2022-03-08 PROCEDURE — 99214 PR OFFICE/OUTPT VISIT, EST, LEVL IV, 30-39 MIN: ICD-10-PCS | Mod: S$GLB,,, | Performed by: FAMILY MEDICINE

## 2022-03-08 PROCEDURE — 3079F PR MOST RECENT DIASTOLIC BLOOD PRESSURE 80-89 MM HG: ICD-10-PCS | Mod: CPTII,S$GLB,, | Performed by: FAMILY MEDICINE

## 2022-03-08 PROCEDURE — 1126F AMNT PAIN NOTED NONE PRSNT: CPT | Mod: CPTII,S$GLB,, | Performed by: FAMILY MEDICINE

## 2022-03-08 PROCEDURE — 3077F SYST BP >= 140 MM HG: CPT | Mod: CPTII,S$GLB,, | Performed by: FAMILY MEDICINE

## 2022-03-08 PROCEDURE — 99214 OFFICE O/P EST MOD 30 MIN: CPT | Mod: S$GLB,,, | Performed by: FAMILY MEDICINE

## 2022-03-08 PROCEDURE — 1159F PR MEDICATION LIST DOCUMENTED IN MEDICAL RECORD: ICD-10-PCS | Mod: CPTII,S$GLB,, | Performed by: FAMILY MEDICINE

## 2022-03-08 PROCEDURE — 3044F HG A1C LEVEL LT 7.0%: CPT | Mod: CPTII,S$GLB,, | Performed by: FAMILY MEDICINE

## 2022-03-08 PROCEDURE — 99999 PR PBB SHADOW E&M-EST. PATIENT-LVL IV: ICD-10-PCS | Mod: PBBFAC,,, | Performed by: FAMILY MEDICINE

## 2022-03-08 PROCEDURE — 1160F PR REVIEW ALL MEDS BY PRESCRIBER/CLIN PHARMACIST DOCUMENTED: ICD-10-PCS | Mod: CPTII,S$GLB,, | Performed by: FAMILY MEDICINE

## 2022-03-08 PROCEDURE — 3079F DIAST BP 80-89 MM HG: CPT | Mod: CPTII,S$GLB,, | Performed by: FAMILY MEDICINE

## 2022-03-08 PROCEDURE — 99999 PR PBB SHADOW E&M-EST. PATIENT-LVL IV: CPT | Mod: PBBFAC,,, | Performed by: FAMILY MEDICINE

## 2022-03-08 PROCEDURE — 3077F PR MOST RECENT SYSTOLIC BLOOD PRESSURE >= 140 MM HG: ICD-10-PCS | Mod: CPTII,S$GLB,, | Performed by: FAMILY MEDICINE

## 2022-03-08 PROCEDURE — 3008F PR BODY MASS INDEX (BMI) DOCUMENTED: ICD-10-PCS | Mod: CPTII,S$GLB,, | Performed by: FAMILY MEDICINE

## 2022-03-08 RX ORDER — PREDNISONE 20 MG/1
TABLET ORAL
Qty: 18 TABLET | Refills: 0 | Status: SHIPPED | OUTPATIENT
Start: 2022-03-08 | End: 2022-03-08 | Stop reason: SDUPTHER

## 2022-03-08 RX ORDER — PREDNISONE 20 MG/1
TABLET ORAL
Qty: 18 TABLET | Refills: 0 | Status: SHIPPED | OUTPATIENT
Start: 2022-03-08 | End: 2022-03-17

## 2022-03-08 NOTE — PROGRESS NOTES
Subjective:       Patient ID: Breanna Silva is a 71 y.o. female.    Chief Complaint: Asthma     Here today for an acute visit.  She is a patient of Dr. Nicholas, new to me today.  She has a medical history of asthma, DM, HLD, hypothyroidism, Osteoporosis.  She is c/o URI symptoms, cough, congestion and wheezing.  She had some steroids and antibiotics at home and has been using them intermittently.  She is on Advair.  She states that she has a granddaughter who is dying in [place] and her mother is a smoker and has 2 dogs.  She has been visiting her and is going there today after the visit.  She had prednisone 10 mg which she took 1 for 2 days and then 1/2 tab until today.    Review of Systems   Constitutional: Negative for activity change, appetite change, fatigue and fever.   Cardiovascular: Negative for chest pain and palpitations.   Gastrointestinal: Negative for abdominal pain, constipation, diarrhea and nausea.   Skin: Negative for pallor and rash.   Neurological: Negative for dizziness, syncope, light-headedness and headaches.       Objective:      Vitals:    03/08/22 0827   BP: (!) 140/80   BP Location: Right arm   Patient Position: Sitting   Weight: 77 kg (169 lb 12.1 oz)   Height: 5' (1.524 m)      Physical Exam  Constitutional:       General: She is not in acute distress.  Cardiovascular:      Rate and Rhythm: Normal rate and regular rhythm.      Heart sounds: Normal heart sounds. No murmur heard.  Pulmonary:      Effort: Pulmonary effort is normal.      Breath sounds: Wheezing present. No rhonchi or rales.   Skin:     General: Skin is warm and dry.   Neurological:      General: No focal deficit present.      Mental Status: She is alert.   Psychiatric:         Mood and Affect: Mood normal.         Behavior: Behavior normal.         Thought Content: Thought content normal.         Assessment:       1. Mild intermittent asthma without complication    2. Type 2 diabetes mellitus without complication, without  long-term current use of insulin        Plan:       Mild intermittent asthma without complication  -     Discontinue: predniSONE (DELTASONE) 20 MG tablet; Take 3 tablets (60 mg total) by mouth once daily for 3 days, THEN 2 tablets (40 mg total) once daily for 3 days, THEN 1 tablet (20 mg total) once daily for 3 days.  Dispense: 18 tablet; Refill: 0  -     predniSONE (DELTASONE) 20 MG tablet; Take 3 tablets (60 mg total) by mouth once daily for 3 days, THEN 2 tablets (40 mg total) once daily for 3 days, THEN 1 tablet (20 mg total) once daily for 3 days.  Dispense: 18 tablet; Refill: 0    Type 2 diabetes mellitus without complication, without long-term current use of insulin    Start steroid taper today.  Discussed monitoring her Blood sugar and watching her diet.  Avoid irritants as best as she can.      Medication List with Changes/Refills   New Medications    PREDNISONE (DELTASONE) 20 MG TABLET    Take 3 tablets (60 mg total) by mouth once daily for 3 days, THEN 2 tablets (40 mg total) once daily for 3 days, THEN 1 tablet (20 mg total) once daily for 3 days.   Current Medications    ACETYLCARNITINE 500 MG CAP    250 mg by mouth twice a day    ALBUTEROL (VENTOLIN HFA) 90 MCG/ACTUATION INHALER    Inhale 2 puffs into the lungs every 6 (six) hours as needed for Wheezing or Shortness of Breath. Rescue    ALENDRONATE (FOSAMAX) 35 MG TABLET    Take 1 tablet by mouth once a week    ALPHA LIPOIC ACID 200 MG CAP    Take 1 capsule by mouth 2 (two) times daily.    AZELASTINE (ASTELIN) 137 MCG (0.1 %) NASAL SPRAY    1 spray 2x a day as needed for nasal congestion; generic please    BIOTIN 1 MG TABLET    Take 1,000 mcg by mouth once daily.    BLOOD SUGAR DIAGNOSTIC (TRUE METRIX GLUCOSE TEST STRIP) STRP    Check blood sugar levels 1-2 times daily    BLOOD SUGAR DIAGNOSTIC STRP    USE 1 STRIP TO CHECK GLUCOSE ONCE TO TWICE DAILY    CALCIUM 500 500 MG CALCIUM (1,250 MG) TABLET    Take 1 tablet by mouth 2 (two) times daily.     CALCIUM CARBONATE/VITAMIN D3 (CALCIUM 600 + D,3, ORAL)    Take 600 mg by mouth 2 (two) times daily.    CLARITIN 10 MG TABLET    Take 10 mg by mouth daily as needed.     COENZYME Q10 200 MG CAPSULE    Take 200 mg by mouth once daily.    DESONIDE (DESOWEN) 0.05 % CREAM    APPLY TO AFFECTED AREA ON FACE TWICE DAILY AS NEEDED FOR 2 WEEKS ONLY    FLUTICASONE-SALMETEROL 230-21 MCG/DOSE (ADVAIR HFA) 230-21 MCG/ACTUATION HFAA INHALER    Inhale 2 puffs into the lungs 2 (two) times daily. Controller    LORATADINE (CLARITIN) 10 MG TABLET    Take 1 tablet (10 mg total) by mouth daily as needed for Allergies (congestion or post nasal drip). Generics please    LUTEIN ORAL    Take by mouth once daily at 6am.    MAGNESIUM OXIDE (MAG-OX) 400 MG (241.3 MG MAGNESIUM) TABLET    Take 1 tablet (400 mg total) by mouth once daily.    MELOXICAM (MOBIC) 15 MG TABLET    Take 1 tablet (15 mg total) by mouth once daily.    METFORMIN (GLUCOPHAGE-XR) 500 MG ER 24HR TABLET    TAKE 1 TABLET BY MOUTH TWICE DAILY WITH MEALS    MULTIVITAMIN (THERAGRAN) PER TABLET    Take 1 tablet by mouth once daily.    ROSUVASTATIN (CRESTOR) 10 MG TABLET    TAKE 1 TABLET BY MOUTH ONCE DAILY IN THE EVENING    TIZANIDINE (ZANAFLEX) 4 MG TABLET    Take 1 tablet (4 mg total) by mouth every 12 (twelve) hours as needed (muscle spasms/ pain).    TORSEMIDE (DEMADEX) 20 MG TAB    20 mg po daily as needed for fluid    TRUE METRIX AIR GLUCOSE METER MISC    USE AS DIRECTED    UNABLE TO FIND    AG Pro - 2 pills twice a day    VITAMIN C 1000 MG TABLET    Take 1,000 mg by mouth once daily.   Discontinued Medications    CEPHALEXIN (KEFLEX) 500 MG CAPSULE    Take 1 capsule (500 mg total) by mouth 4 (four) times daily.    PREDNISONE (DELTASONE) 10 MG TABLET    3 tablets by mouth for 3 days then 2 tablets for 4 days then 1 tablet for 5 days.

## 2022-03-17 DIAGNOSIS — J45.20 MILD INTERMITTENT ASTHMA WITHOUT COMPLICATION: ICD-10-CM

## 2022-03-17 NOTE — TELEPHONE ENCOUNTER
No new care gaps identified.  Powered by [employer]. Reference number: [id number].   3/17/2022 3:35:16 PM CDT

## 2022-03-18 DIAGNOSIS — J45.20 MILD INTERMITTENT ASTHMA WITHOUT COMPLICATION: ICD-10-CM

## 2022-03-18 NOTE — TELEPHONE ENCOUNTER
----- Message from Yasmani Lloyd sent at 3/18/2022  2:07 PM CDT -----  Type:  RX Refill Request    Who Called: Patient  Refill or New Rx: Refill  RX Name and Strength:  fluticasone-salmeterol 230-21 mcg/dose (ADVAIR HFA) 230-21 mcg/actuation HFAA inhaler    How is the patient currently taking it? (ex. 1XDay): Inhale 2 puffs into the lungs 2 (two) times daily. Controller   Is this a 30 day or 90 day RX:     Preferred Pharmacy with phone number:    [id number] - [place], [place] - [address]  [address]  [facility] [address]  Phone: [phone] Fax: [phone]      Local or Mail Order:  Local  Ordering Provider:  Cristopher Palma Call Back Number:  [phone]  Additional Information:

## 2022-03-19 RX ORDER — FLUTICASONE PROPIONATE AND SALMETEROL XINAFOATE 230; 21 UG/1; UG/1
2 AEROSOL, METERED RESPIRATORY (INHALATION) 2 TIMES DAILY
Qty: 12 G | Refills: 2 | Status: SHIPPED | OUTPATIENT
Start: 2022-03-19 | End: 2024-01-11 | Stop reason: SDUPTHER

## 2022-03-22 RX ORDER — FLUTICASONE PROPIONATE AND SALMETEROL XINAFOATE 230; 21 UG/1; UG/1
AEROSOL, METERED RESPIRATORY (INHALATION)
Qty: 12 G | Refills: 0 | OUTPATIENT
Start: 2022-03-22

## 2022-03-22 NOTE — TELEPHONE ENCOUNTER
This medication has been handled/signed by PCP already. Will remove duplicate and close out encounter.

## 2022-04-11 ENCOUNTER — PES CALL (OUTPATIENT)
Dept: ADMINISTRATIVE | Facility: CLINIC | Age: 72
End: 2022-04-11
Payer: MEDICARE

## 2022-05-09 ENCOUNTER — PATIENT MESSAGE (OUTPATIENT)
Dept: SMOKING CESSATION | Facility: CLINIC | Age: 72
End: 2022-05-09
Payer: MEDICARE

## 2022-06-07 ENCOUNTER — LAB VISIT (OUTPATIENT)
Dept: LAB | Facility: HOSPITAL | Age: 72
End: 2022-06-07
Attending: INTERNAL MEDICINE
Payer: MEDICARE

## 2022-06-07 DIAGNOSIS — E11.9 TYPE 2 DIABETES MELLITUS WITHOUT COMPLICATION, WITHOUT LONG-TERM CURRENT USE OF INSULIN: ICD-10-CM

## 2022-06-07 DIAGNOSIS — E83.42 HYPOMAGNESEMIA: ICD-10-CM

## 2022-06-07 DIAGNOSIS — E78.49 OTHER HYPERLIPIDEMIA: ICD-10-CM

## 2022-06-07 LAB
ALBUMIN SERPL BCP-MCNC: 3.9 G/DL (ref 3.5–5.2)
ALP SERPL-CCNC: 51 U/L (ref 55–135)
ALT SERPL W/O P-5'-P-CCNC: 24 U/L (ref 10–44)
ANION GAP SERPL CALC-SCNC: 8 MMOL/L (ref 8–16)
AST SERPL-CCNC: 27 U/L (ref 10–40)
BILIRUB SERPL-MCNC: 0.5 MG/DL (ref 0.1–1)
BUN SERPL-MCNC: 13 MG/DL (ref 8–23)
CALCIUM SERPL-MCNC: 9.2 MG/DL (ref 8.7–10.5)
CHLORIDE SERPL-SCNC: 104 MMOL/L (ref 95–110)
CHOLEST SERPL-MCNC: 176 MG/DL (ref 120–199)
CHOLEST/HDLC SERPL: 2.5 {RATIO} (ref 2–5)
CO2 SERPL-SCNC: 28 MMOL/L (ref 23–29)
CREAT SERPL-MCNC: 0.8 MG/DL (ref 0.5–1.4)
EST. GFR  (AFRICAN AMERICAN): >60 ML/MIN/1.73 M^2
EST. GFR  (NON AFRICAN AMERICAN): >60 ML/MIN/1.73 M^2
ESTIMATED AVG GLUCOSE: 140 MG/DL (ref 68–131)
GLUCOSE SERPL-MCNC: 131 MG/DL (ref 70–110)
HBA1C MFR BLD: 6.5 % (ref 4–5.6)
HDLC SERPL-MCNC: 70 MG/DL (ref 40–75)
HDLC SERPL: 39.8 % (ref 20–50)
LDLC SERPL CALC-MCNC: 86.6 MG/DL (ref 63–159)
MAGNESIUM SERPL-MCNC: 1.8 MG/DL (ref 1.6–2.6)
NONHDLC SERPL-MCNC: 106 MG/DL
POTASSIUM SERPL-SCNC: 4.2 MMOL/L (ref 3.5–5.1)
PROT SERPL-MCNC: 6.8 G/DL (ref 6–8.4)
SODIUM SERPL-SCNC: 140 MMOL/L (ref 136–145)
TRIGL SERPL-MCNC: 97 MG/DL (ref 30–150)

## 2022-06-07 PROCEDURE — 36415 COLL VENOUS BLD VENIPUNCTURE: CPT | Mod: PO | Performed by: INTERNAL MEDICINE

## 2022-06-07 PROCEDURE — 83735 ASSAY OF MAGNESIUM: CPT | Performed by: INTERNAL MEDICINE

## 2022-06-07 PROCEDURE — 80053 COMPREHEN METABOLIC PANEL: CPT | Performed by: INTERNAL MEDICINE

## 2022-06-07 PROCEDURE — 83036 HEMOGLOBIN GLYCOSYLATED A1C: CPT | Performed by: INTERNAL MEDICINE

## 2022-06-07 PROCEDURE — 80061 LIPID PANEL: CPT | Performed by: INTERNAL MEDICINE

## 2022-06-14 ENCOUNTER — OFFICE VISIT (OUTPATIENT)
Dept: FAMILY MEDICINE | Facility: CLINIC | Age: 72
End: 2022-06-14
Payer: MEDICARE

## 2022-06-14 VITALS
WEIGHT: 166.88 LBS | HEIGHT: 60 IN | DIASTOLIC BLOOD PRESSURE: 70 MMHG | BODY MASS INDEX: 32.76 KG/M2 | SYSTOLIC BLOOD PRESSURE: 138 MMHG

## 2022-06-14 DIAGNOSIS — E78.49 OTHER HYPERLIPIDEMIA: ICD-10-CM

## 2022-06-14 DIAGNOSIS — J45.20 MILD INTERMITTENT ASTHMA WITHOUT COMPLICATION: Primary | ICD-10-CM

## 2022-06-14 DIAGNOSIS — E04.2 MULTIPLE THYROID NODULES: ICD-10-CM

## 2022-06-14 DIAGNOSIS — E55.9 VITAMIN D INSUFFICIENCY: ICD-10-CM

## 2022-06-14 DIAGNOSIS — E83.42 HYPOMAGNESEMIA: ICD-10-CM

## 2022-06-14 DIAGNOSIS — M85.89 OSTEOPENIA OF MULTIPLE SITES: ICD-10-CM

## 2022-06-14 DIAGNOSIS — R79.89 ELEVATED TSH: ICD-10-CM

## 2022-06-14 DIAGNOSIS — E11.65 TYPE 2 DIABETES MELLITUS WITH HYPERGLYCEMIA, WITHOUT LONG-TERM CURRENT USE OF INSULIN: ICD-10-CM

## 2022-06-14 DIAGNOSIS — Z01.89 ENCOUNTER FOR LABORATORY TEST: ICD-10-CM

## 2022-06-14 DIAGNOSIS — E66.9 CLASS 1 OBESITY WITH SERIOUS COMORBIDITY AND BODY MASS INDEX (BMI) OF 32.0 TO 32.9 IN ADULT, UNSPECIFIED OBESITY TYPE: ICD-10-CM

## 2022-06-14 PROCEDURE — 3044F HG A1C LEVEL LT 7.0%: CPT | Mod: CPTII,S$GLB,, | Performed by: INTERNAL MEDICINE

## 2022-06-14 PROCEDURE — 99999 PR PBB SHADOW E&M-EST. PATIENT-LVL IV: ICD-10-PCS | Mod: PBBFAC,,, | Performed by: INTERNAL MEDICINE

## 2022-06-14 PROCEDURE — 3008F PR BODY MASS INDEX (BMI) DOCUMENTED: ICD-10-PCS | Mod: CPTII,S$GLB,, | Performed by: INTERNAL MEDICINE

## 2022-06-14 PROCEDURE — 1101F PT FALLS ASSESS-DOCD LE1/YR: CPT | Mod: CPTII,S$GLB,, | Performed by: INTERNAL MEDICINE

## 2022-06-14 PROCEDURE — 3066F NEPHROPATHY DOC TX: CPT | Mod: CPTII,S$GLB,, | Performed by: INTERNAL MEDICINE

## 2022-06-14 PROCEDURE — 1159F PR MEDICATION LIST DOCUMENTED IN MEDICAL RECORD: ICD-10-PCS | Mod: CPTII,S$GLB,, | Performed by: INTERNAL MEDICINE

## 2022-06-14 PROCEDURE — 3078F PR MOST RECENT DIASTOLIC BLOOD PRESSURE < 80 MM HG: ICD-10-PCS | Mod: CPTII,S$GLB,, | Performed by: INTERNAL MEDICINE

## 2022-06-14 PROCEDURE — 99214 OFFICE O/P EST MOD 30 MIN: CPT | Mod: S$GLB,,, | Performed by: INTERNAL MEDICINE

## 2022-06-14 PROCEDURE — 3288F PR FALLS RISK ASSESSMENT DOCUMENTED: ICD-10-PCS | Mod: CPTII,S$GLB,, | Performed by: INTERNAL MEDICINE

## 2022-06-14 PROCEDURE — 1126F AMNT PAIN NOTED NONE PRSNT: CPT | Mod: CPTII,S$GLB,, | Performed by: INTERNAL MEDICINE

## 2022-06-14 PROCEDURE — 99214 PR OFFICE/OUTPT VISIT, EST, LEVL IV, 30-39 MIN: ICD-10-PCS | Mod: S$GLB,,, | Performed by: INTERNAL MEDICINE

## 2022-06-14 PROCEDURE — 3288F FALL RISK ASSESSMENT DOCD: CPT | Mod: CPTII,S$GLB,, | Performed by: INTERNAL MEDICINE

## 2022-06-14 PROCEDURE — 1126F PR PAIN SEVERITY QUANTIFIED, NO PAIN PRESENT: ICD-10-PCS | Mod: CPTII,S$GLB,, | Performed by: INTERNAL MEDICINE

## 2022-06-14 PROCEDURE — 3061F NEG MICROALBUMINURIA REV: CPT | Mod: CPTII,S$GLB,, | Performed by: INTERNAL MEDICINE

## 2022-06-14 PROCEDURE — 3075F PR MOST RECENT SYSTOLIC BLOOD PRESS GE 130-139MM HG: ICD-10-PCS | Mod: CPTII,S$GLB,, | Performed by: INTERNAL MEDICINE

## 2022-06-14 PROCEDURE — 1160F RVW MEDS BY RX/DR IN RCRD: CPT | Mod: CPTII,S$GLB,, | Performed by: INTERNAL MEDICINE

## 2022-06-14 PROCEDURE — 1159F MED LIST DOCD IN RCRD: CPT | Mod: CPTII,S$GLB,, | Performed by: INTERNAL MEDICINE

## 2022-06-14 PROCEDURE — 3075F SYST BP GE 130 - 139MM HG: CPT | Mod: CPTII,S$GLB,, | Performed by: INTERNAL MEDICINE

## 2022-06-14 PROCEDURE — 3078F DIAST BP <80 MM HG: CPT | Mod: CPTII,S$GLB,, | Performed by: INTERNAL MEDICINE

## 2022-06-14 PROCEDURE — 3061F PR NEG MICROALBUMINURIA RESULT DOCUMENTED/REVIEW: ICD-10-PCS | Mod: CPTII,S$GLB,, | Performed by: INTERNAL MEDICINE

## 2022-06-14 PROCEDURE — 99999 PR PBB SHADOW E&M-EST. PATIENT-LVL IV: CPT | Mod: PBBFAC,,, | Performed by: INTERNAL MEDICINE

## 2022-06-14 PROCEDURE — 1101F PR PT FALLS ASSESS DOC 0-1 FALLS W/OUT INJ PAST YR: ICD-10-PCS | Mod: CPTII,S$GLB,, | Performed by: INTERNAL MEDICINE

## 2022-06-14 PROCEDURE — 1160F PR REVIEW ALL MEDS BY PRESCRIBER/CLIN PHARMACIST DOCUMENTED: ICD-10-PCS | Mod: CPTII,S$GLB,, | Performed by: INTERNAL MEDICINE

## 2022-06-14 PROCEDURE — 3044F PR MOST RECENT HEMOGLOBIN A1C LEVEL <7.0%: ICD-10-PCS | Mod: CPTII,S$GLB,, | Performed by: INTERNAL MEDICINE

## 2022-06-14 PROCEDURE — 3008F BODY MASS INDEX DOCD: CPT | Mod: CPTII,S$GLB,, | Performed by: INTERNAL MEDICINE

## 2022-06-14 PROCEDURE — 3066F PR DOCUMENTATION OF TREATMENT FOR NEPHROPATHY: ICD-10-PCS | Mod: CPTII,S$GLB,, | Performed by: INTERNAL MEDICINE

## 2022-06-14 NOTE — PROGRESS NOTES
Subjective:       Patient ID: Breanna Silva is a 71 y.o. female.    Chief Complaint: No chief complaint on file.    HPI Pt here today for reassessment and go over lab work.   Mild intermittent asthma without complication: has been doing well; rare use of rescue inhaler.   Type 2 diabetes mellitus without complication, without long-term current use of insulin; Maintain a low carb diet; monitor CBG's at home; keep a log for review.  Fasting blood sugar 131. Hemoglobin A1c 6.5; blood sugars average 120s to 130.   -     Lipid Panel; Future; Expected date: 06/14/2022  -     Basic Metabolic Panel; Future; Expected date: 06/14/2022  -     T4, Free; Future; Expected date: 09/14/2022  -     TSH; Future; Expected date: 09/14/2022  -     Hemoglobin A1C; Future; Expected date: 06/14/2022  -     Thyroid Peroxidase Antibody; Future; Expected date: 06/14/2022  -     Thyroid Stimulating Immunoglobulin; Future; Expected date: 06/14/2022  Hypomagnesemia: same MagOx 400 mg a day; otc.  Other hyperlipidemia; Maintain low fat high fiber diet, exercise regularly. Weight reduction where indicated. Cont rosuvastatin; increase metamucil to 1-2x a day work on diet closer and exerccise.  Lipid profile:  Cholesterol 176/triglyceride 97/HDL 70/LDL 86 with goal less than 70. LDL was 46 prior.  Patient has been cooking and baking a lot lately.  Adhere to her diet closer and add Metamucil to her diet  -     Lipid Panel; Future; Expected date: 06/14/2022  -     T4, Free; Future; Expected date: 09/14/2022  -     TSH; Future; Expected date: 09/14/2022  -     Thyroid Peroxidase Antibody; Future; Expected date: 06/14/2022  -     Thyroid Stimulating Immunoglobulin; Future; Expected date: 06/14/2022  Encounter for laboratory test:  Labs reviewed and discussed with patient at length in ordered for follow-up.  -     Lipid Panel; Future; Expected date: 06/14/2022  -     Basic Metabolic Panel; Future; Expected date: 06/14/2022  -     T4, Free; Future;  Expected date: 09/14/2022  -     TSH; Future; Expected date: 09/14/2022  -     Hemoglobin A1C; Future; Expected date: 06/14/2022  -     Vitamin D; Future; Expected date: 06/14/2022  Osteopenia of multiple sites; does weight bearing exercises, continue calcium and vit D supplements. DEXA scabn at 2 yr intervals as needed. DEXA due after 10/26/5074-7068; 10/26/2020 DEXA scan with lumbar spine T-score -1.0 and left femoral neck -2.3.  -     T4, Free; Future; Expected date: 09/14/2022  -     TSH; Future; Expected date: 09/14/2022  -     Vitamin D; Future; Expected date: 06/14/2022  Vitamin D insufficiency:  Continue present vitamin-D supplementation; DEXA update needed as above; vitamin-D 3 5000 IU use; stop for 1 month now weekly.  1/12 vitamin-D level 73  -     Vitamin D; Future; Expected date: 06/14/2022  Elevated TSH; 01/12/2022 TSH 3.78, free T3 2.7 and free T4 0.94; off levothyroxine. Check thyroid US for nodule reassessment  -     T4, Free; Future; Expected date: 09/14/2022  -     TSH; Future; Expected date: 09/14/2022  -     Thyroid Peroxidase Antibody; Future; Expected date: 06/14/2022  -     Thyroid Stimulating Immunoglobulin; Future; Expected date: 06/14/2022  Multiple thyroid nodules: Thyroid/neck US for reassessment; last 8/3/21. Nl appearing neck LN's; see kidney focus as well may be cysts.   Class 1 obesity with serious comorbidity and body mass index (BMI) of 32.0 to 32.9 in adult, unspecified obesity type; Caloric restriction w regular exercise and weight reduction.  -     Lipid Panel; Future; Expected date: 06/14/2022  -     Basic Metabolic Panel; Future; Expected date: 06/14/2022  -     T4, Free; Future; Expected date: 09/14/2022  -     TSH; Future; Expected date: 09/14/2022  -     Thyroid Peroxidase Antibody; Future; Expected date: 06/14/2022  -     Thyroid Stimulating Immunoglobulin; Future; Expected date: 06/14/2022        Review of Systems   Constitutional: Negative for appetite change and fever.    HENT: Negative for congestion, postnasal drip, rhinorrhea and sinus pressure.    Eyes: Negative for discharge and itching.   Respiratory: Negative for cough, chest tightness, shortness of breath and wheezing.    Cardiovascular: Negative for chest pain, palpitations and leg swelling.   Gastrointestinal: Negative for abdominal distention, abdominal pain, blood in stool, constipation, diarrhea, nausea and vomiting.   Endocrine: Negative for polydipsia, polyphagia and polyuria.   Genitourinary: Negative for dysuria and hematuria.   Musculoskeletal: Negative for arthralgias and myalgias.   Skin: Negative for rash.   Allergic/Immunologic: Negative for environmental allergies and food allergies.   Neurological: Negative for tremors, seizures and syncope.   Hematological: Negative for adenopathy. Does not bruise/bleed easily.   Psychiatric/Behavioral: Negative for dysphoric mood. The patient is not nervous/anxious.       Objective:        Vitals:    06/14/22 0755   BP: 138/70   BP Location: Left arm   Weight: 75.7 kg (166 lb 14.2 oz)   Height: 5' (1.524 m)       BMI Readings from Last 3 Encounters:   06/14/22 32.59 kg/m²   03/08/22 33.15 kg/m²   02/09/22 33.54 kg/m²        Wt Readings from Last 3 Encounters:   06/14/22 0755 75.7 kg (166 lb 14.2 oz)   03/08/22 0827 77 kg (169 lb 12.1 oz)   02/09/22 1305 77.9 kg (171 lb 11.8 oz)        BP Readings from Last 3 Encounters:   06/14/22 138/70   03/08/22 (!) 140/80   02/09/22 132/72        There are no preventive care reminders to display for this patient.     Health Maintenance Due   Topic Date Due    COVID-19 Vaccine (1) Never done    Shingles Vaccine (1 of 2) Never done    Colorectal Cancer Screening  08/08/2022         Past Medical History:   Diagnosis Date    Age-related osteoporosis without current pathological fracture     Asthma     Diabetes mellitus, type 2     w wt loss off metformin now; diet controlled.    Hypothyroidism     Mild intermittent asthma without  complication     on Xolair inj monthly; allergist Dr Nelson.    Osteopenia     Osteopenia     Pre-hypertension     suspected white coat syndrome; also elevated w seeing ortho x2 for pain.    Proteinuria     Pure hypercholesterolemia     Thyroid nodule     Vitamin D insufficiency        Past Surgical History:   Procedure Laterality Date     SECTION      x3    COLONOSCOPY      Dr Butler: 3-4 polyps removed; five-year follow-up recommended    NECK SURGERY  1989    fusion C5-6; 2 discs removed as well.    TOTAL HIP ARTHROPLASTY Right     ; avascular necrosis.    TUBAL LIGATION         Social History     Tobacco Use    Smoking status: Former Smoker     Packs/day: 1.00     Years: 30.00     Pack years: 30.00     Quit date:      Years since quittin.4    Smokeless tobacco: Never Used   Substance Use Topics    Alcohol use: No     Alcohol/week: 0.0 standard drinks    Drug use: No       Family History   Problem Relation Age of Onset    Arthritis Mother     Cancer Mother         lung ca; smoking.    COPD Mother     Diabetes Maternal Grandmother     Heart disease Maternal Grandfather        Review of patient's allergies indicates:   Allergen Reactions    Flu vaccine -(3 yr+)(pf) Anaphylaxis      Flu vaccine  pneumonitis & in hospital for 1 month/ ICU for 1 week    [place] [exenatide]     Pravastatin sodium      elev LFT    Prinivil [lisinopril]      Cough      Avelox [moxifloxacin] Rash       Current Outpatient Medications on File Prior to Visit   Medication Sig Dispense Refill    biotin 1 mg tablet Take 1,000 mcg by mouth once daily.      CALCIUM CARBONATE/VITAMIN D3 (CALCIUM 600 + D,3, ORAL) Take 600 mg by mouth 2 (two) times daily.      coenzyme Q10 200 mg capsule Take 200 mg by mouth once daily.      fluticasone-salmeterol 230-21 mcg/dose (ADVAIR HFA) 230-21 mcg/actuation HFAA inhaler Inhale 2 puffs into the lungs 2 (two) times daily. Controller 12 g  2    loratadine (CLARITIN) 10 mg tablet Take 1 tablet (10 mg total) by mouth daily as needed for Allergies (congestion or post nasal drip). Generics please 90 tablet 1    LUTEIN ORAL Take by mouth once daily at 6am.      magnesium oxide (MAG-OX) 400 mg (241.3 mg magnesium) tablet Take 1 tablet (400 mg total) by mouth once daily. 90 tablet 1    meloxicam (MOBIC) 15 MG tablet Take 1 tablet (15 mg total) by mouth once daily. 30 tablet 4    metFORMIN (GLUCOPHAGE-XR) 500 MG ER 24hr tablet TAKE 1 TABLET BY MOUTH TWICE DAILY WITH MEALS 180 tablet 1    multivitamin (THERAGRAN) per tablet Take 1 tablet by mouth once daily.      rosuvastatin (CRESTOR) 10 MG tablet TAKE 1 TABLET BY MOUTH ONCE DAILY IN THE EVENING 90 tablet 3    UNABLE TO FIND AG Pro - 2 pills twice a day      VITAMIN C 1000 MG tablet Take 1,000 mg by mouth once daily.      acetylcarnitine 500 mg Cap 250 mg by mouth twice a day      albuterol (VENTOLIN HFA) 90 mcg/actuation inhaler Inhale 2 puffs into the lungs every 6 (six) hours as needed for Wheezing or Shortness of Breath. Rescue (Patient not taking: Reported on 6/14/2022) 18 g 3    alendronate (FOSAMAX) 35 MG tablet Take 1 tablet by mouth once a week 15 tablet 3    alpha lipoic acid 200 mg Cap Take 1 capsule by mouth 2 (two) times daily.      azelastine (ASTELIN) 137 mcg (0.1 %) nasal spray 1 spray 2x a day as needed for nasal congestion; generic please 30 mL 2    blood sugar diagnostic (TRUE METRIX GLUCOSE TEST STRIP) Strp Check blood sugar levels 1-2 times daily 180 strip 1    blood sugar diagnostic Strp USE 1 STRIP TO CHECK GLUCOSE ONCE TO TWICE DAILY 200 each 1    desonide (DESOWEN) 0.05 % cream APPLY TO AFFECTED AREA ON FACE TWICE DAILY AS NEEDED FOR 2 WEEKS ONLY      torsemide (DEMADEX) 20 MG Tab 20 mg po daily as needed for fluid (Patient not taking: Reported on 6/14/2022) 30 tablet 1    TRUE METRIX AIR GLUCOSE METER [facility] USE AS DIRECTED 1 each 0    [DISCONTINUED] CALCIUM 500 500  mg calcium (1,250 mg) tablet Take 1 tablet by mouth 2 (two) times daily.      [DISCONTINUED] CLARITIN 10 mg tablet Take 10 mg by mouth daily as needed.       [DISCONTINUED] tiZANidine (ZANAFLEX) 4 MG tablet Take 1 tablet (4 mg total) by mouth every 12 (twelve) hours as needed (muscle spasms/ pain). 40 tablet 0     No current facility-administered medications on file prior to visit.       Physical Exam  Vitals reviewed.   Constitutional:       Appearance: She is well-developed.   HENT:      Head: Normocephalic and atraumatic.   Neck:      Thyroid: No thyromegaly.      Vascular: No carotid bruit.   Cardiovascular:      Rate and Rhythm: Normal rate and regular rhythm.      Heart sounds: Normal heart sounds. No murmur heard.    No gallop.   Pulmonary:      Effort: Pulmonary effort is normal. No respiratory distress.      Breath sounds: Normal breath sounds. No wheezing or rales.   Abdominal:      General: Bowel sounds are normal. There is no distension.      Palpations: Abdomen is soft.      Tenderness: There is no abdominal tenderness. There is no guarding or rebound.   Musculoskeletal:         General: Normal range of motion.      Cervical back: Normal range of motion and neck supple.      Right lower leg: No edema.      Left lower leg: No edema.   Lymphadenopathy:      Cervical: No cervical adenopathy.   Skin:     Findings: No rash.   Neurological:      Mental Status: She is alert and oriented to person, place, and time.      Comments: Moves all 4 extremities fine.   Psychiatric:         Behavior: Behavior normal.         Thought Content: Thought content normal.         Assessment:       1. Mild intermittent asthma without complication    2. Type 2 diabetes mellitus without complication, without long-term current use of insulin    3. Hypomagnesemia    4. Other hyperlipidemia    5. Encounter for laboratory test    6. Osteopenia of multiple sites    7. Vitamin D insufficiency    8. Elevated TSH    9. Class 1 obesity  with serious comorbidity and body mass index (BMI) of 32.0 to 32.9 in adult, unspecified obesity type    10. Multiple thyroid nodules        Plan:       Mild intermittent asthma without complication: has been doing well; rare use of rescue inhaler.     Type 2 diabetes mellitus without complication, without long-term current use of insulin; Maintain a low carb diet; monitor CBG's at home; keep a log for review.  Continued attempts at regular exercise and weight reduction.  Hemoglobin A1c 6.5 with fasting blood sugar 131.   -     Lipid Panel; Future; Expected date: 06/14/2022  -     Basic Metabolic Panel; Future; Expected date: 06/14/2022  -     T4, Free; Future; Expected date: 09/14/2022  -     TSH; Future; Expected date: 09/14/2022  -     Hemoglobin A1C; Future; Expected date: 06/14/2022  -     Thyroid Peroxidase Antibody; Future; Expected date: 06/14/2022  -     Thyroid Stimulating Immunoglobulin; Future; Expected date: 06/14/2022    Hypomagnesemia: same MagOx 400 mg a day; otc.     Other hyperlipidemia; Maintain low fat high fiber diet, exercise regularly. Weight reduction where indicated. Cont rosuvastatin; increase metamucil to 1-2x a day work on diet closer and exerccise.   -     Lipid Panel; Future; Expected date: 06/14/2022  -     T4, Free; Future; Expected date: 09/14/2022  -     TSH; Future; Expected date: 09/14/2022  -     Thyroid Peroxidase Antibody; Future; Expected date: 06/14/2022  -     Thyroid Stimulating Immunoglobulin; Future; Expected date: 06/14/2022    Encounter for laboratory test:  Labs reviewed and discussed with patient at length and ordered for follow-up.  -     Lipid Panel; Future; Expected date: 06/14/2022  -     Basic Metabolic Panel; Future; Expected date: 06/14/2022  -     T4, Free; Future; Expected date: 09/14/2022  -     TSH; Future; Expected date: 09/14/2022  -     Hemoglobin A1C; Future; Expected date: 06/14/2022  -     Vitamin D; Future; Expected date: 06/14/2022    Osteopenia of  multiple sites; Weight bearing exercises, continue calcium and vit D supplements. DEXA scabn at 2 yr intervals as needed. DEXA due after 10/26/6367-6824  -     T4, Free; Future; Expected date: 09/14/2022  -     TSH; Future; Expected date: 09/14/2022  -     Vitamin D; Future; Expected date: 06/14/2022    Vitamin D insufficiency:  Presently on calcium with vitamin-D supplementation.  -     Vitamin D; Future; Expected date: 06/14/2022    Elevated TSH; off levothyroxine. Check thyroid US for nodule reassessment  -     T4, Free; Future; Expected date: 09/14/2022  -     TSH; Future; Expected date: 09/14/2022  -     Thyroid Peroxidase Antibody; Future; Expected date: 06/14/2022  -     Thyroid Stimulating Immunoglobulin; Future; Expected date: 06/14/2022    Multiple thyroid nodules: Thyroid/neck US for reassessment; last 8/3/21. Nl appearing neck LN's; see kidney focus as well may be cysts.     Class 1 obesity with serious comorbidity and body mass index (BMI) of 32.0 to 32.9 in adult, unspecified obesity type; Caloric restriction w regular exercise and weight reduction.  Continued attempts at regular exercise and adherence to her diet.  -     Lipid Panel; Future; Expected date: 06/14/2022  -     Basic Metabolic Panel; Future; Expected date: 06/14/2022  -     T4, Free; Future; Expected date: 09/14/2022  -     TSH; Future; Expected date: 09/14/2022  -     Thyroid Peroxidase Antibody; Future; Expected date: 06/14/2022  -     Thyroid Stimulating Immunoglobulin; Future; Expected date: 06/14/2022

## 2022-06-14 NOTE — PATIENT INSTRUCTIONS
Mild intermittent asthma without complication: has been doing well; rare use of rescue inhaler.     Type 2 diabetes mellitus without complication, without long-term current use of insulin; Maintain a low carb diet; monitor CBG's at home; keep a log for review.  -     Lipid Panel; Future; Expected date: 06/14/2022  -     Basic Metabolic Panel; Future; Expected date: 06/14/2022  -     T4, Free; Future; Expected date: 09/14/2022  -     TSH; Future; Expected date: 09/14/2022  -     Hemoglobin A1C; Future; Expected date: 06/14/2022  -     Thyroid Peroxidase Antibody; Future; Expected date: 06/14/2022  -     Thyroid Stimulating Immunoglobulin; Future; Expected date: 06/14/2022    Hypomagnesemia: same MagOx 400 mg a day; otc.     Other hyperlipidemia; Maintain low fat high fiber diet, exercise regularly. Weight reduction where indicated. Cont rosuvastatin; increase metamucil to 1-2x a day work on diet closer and exerccise.   -     Lipid Panel; Future; Expected date: 06/14/2022  -     T4, Free; Future; Expected date: 09/14/2022  -     TSH; Future; Expected date: 09/14/2022  -     Thyroid Peroxidase Antibody; Future; Expected date: 06/14/2022  -     Thyroid Stimulating Immunoglobulin; Future; Expected date: 06/14/2022    Encounter for laboratory test  -     Lipid Panel; Future; Expected date: 06/14/2022  -     Basic Metabolic Panel; Future; Expected date: 06/14/2022  -     T4, Free; Future; Expected date: 09/14/2022  -     TSH; Future; Expected date: 09/14/2022  -     Hemoglobin A1C; Future; Expected date: 06/14/2022  -     Vitamin D; Future; Expected date: 06/14/2022    Osteopenia of multiple sites; Exercise recommended with weight reduction and low carb diet; we'll follow hemoglobin A1c's with you periodically. DEXA due after 10/26/9081-9271  -     T4, Free; Future; Expected date: 09/14/2022  -     TSH; Future; Expected date: 09/14/2022  -     Vitamin D; Future; Expected date: 06/14/2022    Vitamin D insufficiency  -      Vitamin D; Future; Expected date: 06/14/2022    Elevated TSH; off levothyroxine. Check thyroid US for nodule reassessment  -     T4, Free; Future; Expected date: 09/14/2022  -     TSH; Future; Expected date: 09/14/2022  -     Thyroid Peroxidase Antibody; Future; Expected date: 06/14/2022  -     Thyroid Stimulating Immunoglobulin; Future; Expected date: 06/14/2022    Multiple thyroid nodules: Thyroid/neck US for reassessment; last 8/3/21. Nl appearing neck LN's; see kidney focus as well may be cysts.     Class 1 obesity with serious comorbidity and body mass index (BMI) of 32.0 to 32.9 in adult, unspecified obesity type; Caloric restriction w regular exercise and weight reduction.  -     Lipid Panel; Future; Expected date: 06/14/2022  -     Basic Metabolic Panel; Future; Expected date: 06/14/2022  -     T4, Free; Future; Expected date: 09/14/2022  -     TSH; Future; Expected date: 09/14/2022  -     Thyroid Peroxidase Antibody; Future; Expected date: 06/14/2022  -     Thyroid Stimulating Immunoglobulin; Future; Expected date: 06/14/2022

## 2022-07-12 DIAGNOSIS — E11.9 TYPE 2 DIABETES MELLITUS WITHOUT COMPLICATION, WITHOUT LONG-TERM CURRENT USE OF INSULIN: ICD-10-CM

## 2022-07-13 RX ORDER — METFORMIN HYDROCHLORIDE 500 MG/1
TABLET, EXTENDED RELEASE ORAL
Qty: 180 TABLET | Refills: 1 | Status: SHIPPED | OUTPATIENT
Start: 2022-07-13 | End: 2023-11-24

## 2022-07-13 NOTE — TELEPHONE ENCOUNTER
No new care gaps identified.  [facility] Embedded Care Gaps. Reference number: [id number]. 7/12/2022   8:01:35 PM CDT

## 2022-07-13 NOTE — TELEPHONE ENCOUNTER
Refill Decision Note   Breanna Shira  is requesting a refill authorization.  Brief Assessment and Rationale for Refill:  Approve     Medication Therapy Plan:       Medication Reconciliation Completed: No   Comments:     No Care Gaps recommended.     Note composed:2:38 PM 07/13/2022

## 2022-08-01 ENCOUNTER — PES CALL (OUTPATIENT)
Dept: ADMINISTRATIVE | Facility: CLINIC | Age: 72
End: 2022-08-01
Payer: MEDICARE

## 2022-08-05 ENCOUNTER — TELEPHONE (OUTPATIENT)
Dept: ADMINISTRATIVE | Facility: CLINIC | Age: 72
End: 2022-08-05
Payer: MEDICARE

## 2022-08-08 ENCOUNTER — OFFICE VISIT (OUTPATIENT)
Dept: FAMILY MEDICINE | Facility: CLINIC | Age: 72
End: 2022-08-08
Payer: MEDICARE

## 2022-08-08 VITALS
BODY MASS INDEX: 32.76 KG/M2 | OXYGEN SATURATION: 96 % | HEIGHT: 60 IN | SYSTOLIC BLOOD PRESSURE: 132 MMHG | WEIGHT: 166.88 LBS | DIASTOLIC BLOOD PRESSURE: 68 MMHG | HEART RATE: 77 BPM

## 2022-08-08 DIAGNOSIS — Z00.00 ENCOUNTER FOR PREVENTIVE HEALTH EXAMINATION: Primary | ICD-10-CM

## 2022-08-08 DIAGNOSIS — J44.9 CHRONIC OBSTRUCTIVE PULMONARY DISEASE, UNSPECIFIED COPD TYPE: ICD-10-CM

## 2022-08-08 DIAGNOSIS — E03.9 HYPOTHYROIDISM, UNSPECIFIED TYPE: ICD-10-CM

## 2022-08-08 DIAGNOSIS — E11.9 TYPE 2 DIABETES MELLITUS WITHOUT COMPLICATION, WITHOUT LONG-TERM CURRENT USE OF INSULIN: ICD-10-CM

## 2022-08-08 DIAGNOSIS — Z12.31 ENCOUNTER FOR SCREENING MAMMOGRAM FOR MALIGNANT NEOPLASM OF BREAST: ICD-10-CM

## 2022-08-08 DIAGNOSIS — E78.49 OTHER HYPERLIPIDEMIA: ICD-10-CM

## 2022-08-08 PROCEDURE — 3044F HG A1C LEVEL LT 7.0%: CPT | Mod: CPTII,S$GLB,, | Performed by: NURSE PRACTITIONER

## 2022-08-08 PROCEDURE — 99999 PR PBB SHADOW E&M-EST. PATIENT-LVL V: CPT | Mod: PBBFAC,,, | Performed by: NURSE PRACTITIONER

## 2022-08-08 PROCEDURE — 3288F PR FALLS RISK ASSESSMENT DOCUMENTED: ICD-10-PCS | Mod: CPTII,S$GLB,, | Performed by: NURSE PRACTITIONER

## 2022-08-08 PROCEDURE — 3066F NEPHROPATHY DOC TX: CPT | Mod: CPTII,S$GLB,, | Performed by: NURSE PRACTITIONER

## 2022-08-08 PROCEDURE — 3044F PR MOST RECENT HEMOGLOBIN A1C LEVEL <7.0%: ICD-10-PCS | Mod: CPTII,S$GLB,, | Performed by: NURSE PRACTITIONER

## 2022-08-08 PROCEDURE — 1101F PT FALLS ASSESS-DOCD LE1/YR: CPT | Mod: CPTII,S$GLB,, | Performed by: NURSE PRACTITIONER

## 2022-08-08 PROCEDURE — 99999 PR PBB SHADOW E&M-EST. PATIENT-LVL V: ICD-10-PCS | Mod: PBBFAC,,, | Performed by: NURSE PRACTITIONER

## 2022-08-08 PROCEDURE — 1160F RVW MEDS BY RX/DR IN RCRD: CPT | Mod: CPTII,S$GLB,, | Performed by: NURSE PRACTITIONER

## 2022-08-08 PROCEDURE — 1159F PR MEDICATION LIST DOCUMENTED IN MEDICAL RECORD: ICD-10-PCS | Mod: CPTII,S$GLB,, | Performed by: NURSE PRACTITIONER

## 2022-08-08 PROCEDURE — 1170F PR FUNCTIONAL STATUS ASSESSED: ICD-10-PCS | Mod: CPTII,S$GLB,, | Performed by: NURSE PRACTITIONER

## 2022-08-08 PROCEDURE — 1170F FXNL STATUS ASSESSED: CPT | Mod: CPTII,S$GLB,, | Performed by: NURSE PRACTITIONER

## 2022-08-08 PROCEDURE — G0439 PR MEDICARE ANNUAL WELLNESS SUBSEQUENT VISIT: ICD-10-PCS | Mod: S$GLB,,, | Performed by: NURSE PRACTITIONER

## 2022-08-08 PROCEDURE — 3061F NEG MICROALBUMINURIA REV: CPT | Mod: CPTII,S$GLB,, | Performed by: NURSE PRACTITIONER

## 2022-08-08 PROCEDURE — 1126F PR PAIN SEVERITY QUANTIFIED, NO PAIN PRESENT: ICD-10-PCS | Mod: CPTII,S$GLB,, | Performed by: NURSE PRACTITIONER

## 2022-08-08 PROCEDURE — 3008F BODY MASS INDEX DOCD: CPT | Mod: CPTII,S$GLB,, | Performed by: NURSE PRACTITIONER

## 2022-08-08 PROCEDURE — 1126F AMNT PAIN NOTED NONE PRSNT: CPT | Mod: CPTII,S$GLB,, | Performed by: NURSE PRACTITIONER

## 2022-08-08 PROCEDURE — 3008F PR BODY MASS INDEX (BMI) DOCUMENTED: ICD-10-PCS | Mod: CPTII,S$GLB,, | Performed by: NURSE PRACTITIONER

## 2022-08-08 PROCEDURE — 3288F FALL RISK ASSESSMENT DOCD: CPT | Mod: CPTII,S$GLB,, | Performed by: NURSE PRACTITIONER

## 2022-08-08 PROCEDURE — 1159F MED LIST DOCD IN RCRD: CPT | Mod: CPTII,S$GLB,, | Performed by: NURSE PRACTITIONER

## 2022-08-08 PROCEDURE — 3066F PR DOCUMENTATION OF TREATMENT FOR NEPHROPATHY: ICD-10-PCS | Mod: CPTII,S$GLB,, | Performed by: NURSE PRACTITIONER

## 2022-08-08 PROCEDURE — G0439 PPPS, SUBSEQ VISIT: HCPCS | Mod: S$GLB,,, | Performed by: NURSE PRACTITIONER

## 2022-08-08 PROCEDURE — 3061F PR NEG MICROALBUMINURIA RESULT DOCUMENTED/REVIEW: ICD-10-PCS | Mod: CPTII,S$GLB,, | Performed by: NURSE PRACTITIONER

## 2022-08-08 PROCEDURE — 1101F PR PT FALLS ASSESS DOC 0-1 FALLS W/OUT INJ PAST YR: ICD-10-PCS | Mod: CPTII,S$GLB,, | Performed by: NURSE PRACTITIONER

## 2022-08-08 PROCEDURE — 1160F PR REVIEW ALL MEDS BY PRESCRIBER/CLIN PHARMACIST DOCUMENTED: ICD-10-PCS | Mod: CPTII,S$GLB,, | Performed by: NURSE PRACTITIONER

## 2022-08-08 NOTE — PATIENT INSTRUCTIONS
Counseling and Referral of Other Preventative  (Italic type indicates deductible and co-insurance are waived)    Patient Name: Breanna Silva  Today's Date: 8/8/2022    Health Maintenance       Date Due Completion Date    COVID-19 Vaccine (1) Never done ---    Shingles Vaccine (1 of 2) Never done ---    Colorectal Cancer Screening 08/08/2022 8/8/2017    Override on 8/8/2017: Done (Dr Butler; 3-4 polyps removed; 5 yr f/u recommended.)    Foot Exam 09/07/2022 9/7/2021    Override on 10/12/2020: Done (done in office today 10/12/20)    Override on 4/15/2019: Done (Footexam done today)    Override on 11/27/2017: Done    Mammogram 09/13/2022 9/13/2021    Hemoglobin A1c 12/07/2022 6/7/2022    Eye Exam 01/11/2023 1/11/2022    Diabetes Urine Screening 06/07/2023 6/7/2022    Lipid Panel 06/07/2023 6/7/2022    Low Dose Statin 06/25/2023 6/25/2022    DEXA Scan 10/26/2023 10/26/2020    TETANUS VACCINE 05/22/2028 5/22/2018 (Done)    Override on 5/22/2018: Done (Tdap)    Override on 5/22/2018: Done (script given for Tdap vaccine to get at pharmacy.)        No orders of the defined types were placed in this encounter.    The following information is provided to all patients.  This information is to help you find resources for any of the problems found today that may be affecting your health:                Living healthy guide: www.[facility].[place].gov      Understanding Diabetes: www.diabetes.org      Eating healthy: www.cdc.gov/healthyweight      CDC home safety checklist: www.cdc.gov/steadi/patient.html      Agency on Aging: www.goea.[place].gov      Alcoholics anonymous (AA): www.aa.org      Physical Activity: www.virgilio.nih.gov/zb3ibhx      Tobacco use: www.quitwithusla.org

## 2022-08-08 NOTE — PROGRESS NOTES
Breanna Silva presented for a  Medicare AWV and comprehensive Health Risk Assessment today. The following components were reviewed and updated:    · Medical history  · Family History  · Social history  · Allergies and Current Medications  · Health Risk Assessment  · Health Maintenance  · Care Team     ** See Completed Assessments for Annual Wellness Visit within the encounter summary.**       The following assessments were completed:  · Living Situation  · CAGE  · Depression Screening  · Timed Get Up and Go  · Whisper Test  · Cognitive Function Screening    · Nutrition Screening  · ADL Screening  · PAQ Screening    There were no vitals filed for this visit.  There is no height or weight on file to calculate BMI.  Physical Exam  Vitals reviewed.   Constitutional:       Appearance: Normal appearance.   Cardiovascular:      Rate and Rhythm: Normal rate and regular rhythm.      Pulses: Normal pulses.      Heart sounds: Normal heart sounds.   Pulmonary:      Effort: Pulmonary effort is normal.      Breath sounds: Normal breath sounds.   Skin:     General: Skin is warm and dry.   Neurological:      Mental Status: She is alert and oriented to person, place, and time.   Psychiatric:         Mood and Affect: Mood normal.         Behavior: Behavior normal.         Judgment: Judgment normal.       Diagnoses and health risks identified today and associated recommendations/orders:    1. Encounter for preventive health examination  Reviewed and discussed health maintenance.    Colonoscopy with   Mammogram ordered. Will call schedule  Declines update on vaccines    2. Chronic obstructive pulmonary disease, unspecified COPD type  Stable- continue current treatment and follow up routinely with PCP     3. Type 2 diabetes mellitus without complication, without long-term current use of insulin  Stable- continue current treatment and follow up routinely with PCP     4. Hypothyroidism, unspecified type  Stable- continue  current treatment and follow up routinely with PCP     5. BMI 33.0-33.9,adult  Encouraged healthy eating, weight loss and routine exercise     6. Other hyperlipidemia  Stable- continue current treatment and follow up routinely with PCP and cardiology ()    Provided Breanna with a 5-10 year written screening schedule and personal prevention plan. Recommendations were developed using the USPSTF age appropriate recommendations. Education, counseling, and referrals were provided as needed. After Visit Summary printed and given to patient which includes a list of additional screenings\tests needed.    I offered to discuss advanced care planning, including how to pick a person who would make decisions for you if you were unable to make them for yourself, called a health care power of , and what kind of decisions you might make such as use of life sustaining treatments such as ventilators and tube feeding when faced with a life limiting illness recorded on a living will that they will need to know. (How you want to be cared for as you near the end of your natural life)     X Patient is interested in learning more about how to make advanced directives.  I provided them paperwork and offered to discuss this with them.    Patria Yang, ISAÍAS

## 2022-08-11 DIAGNOSIS — E11.9 TYPE 2 DIABETES MELLITUS WITHOUT COMPLICATION, WITHOUT LONG-TERM CURRENT USE OF INSULIN: ICD-10-CM

## 2022-08-11 NOTE — TELEPHONE ENCOUNTER
Refill Decision Note   Breanna Shira  is requesting a refill authorization.  Brief Assessment and Rationale for Refill:  Approve     Medication Therapy Plan:       Medication Reconciliation Completed: No   Comments:     No Care Gaps recommended.     Note composed:5:45 PM 08/11/2022

## 2022-08-11 NOTE — TELEPHONE ENCOUNTER
No new care gaps identified.  [facility] Embedded Care Gaps. Reference number: [id number]. 8/11/2022   9:14:10 AM CDT

## 2022-08-31 ENCOUNTER — PATIENT OUTREACH (OUTPATIENT)
Dept: ADMINISTRATIVE | Facility: HOSPITAL | Age: 72
End: 2022-08-31
Payer: MEDICARE

## 2022-08-31 ENCOUNTER — PATIENT MESSAGE (OUTPATIENT)
Dept: ADMINISTRATIVE | Facility: HOSPITAL | Age: 72
End: 2022-08-31
Payer: MEDICARE

## 2022-08-31 NOTE — LETTER
AUTHORIZATION FOR RELEASE OF   CONFIDENTIAL INFORMATION    Dear Giovanni Butler MD,    We are seeing Breanna Silva, date of birth 1950, in the clinic at [facility]. Nguyễn Nicholas MD is the patient's PCP. Breanna Silva has an outstanding lab/procedure at the time we reviewed her chart. In order to help keep her health information updated, she has authorized us to request the following medical record(s):        (  )  MAMMOGRAM                                      (X)  COLONOSCOPY W F/U      (  )  PAP SMEAR                                          (  )  OUTSIDE LAB RESULTS     (  )  DEXA SCAN                                          (  )  EYE EXAM            (  )  FOOT EXAM                                          (  )  ENTIRE RECORD     (  )  OUTSIDE IMMUNIZATIONS                 (  )  _______________         Please fax records to [facility], Ronald Kluchin, MD, [phone]    If you have any questions, please contact Russell Yuen LPN Care Coordinator  at [phone].            Patient Name: Breanna Silva  : 1950  Patient Phone #: [phone]

## 2022-08-31 NOTE — PROGRESS NOTES
2022 Care Everywhere updates requested and reviewed.  Immunizations reconciled. Media reports reviewed.  Duplicate HM overrides and  orders removed.  Overdue HM topic chart audit and/or requested.  Overdue lab testing linked to upcoming lab appointments if applies.  Requested colonoscopy records Giovanni Butler MD  Mammogram scheduled 2022    Health Maintenance Due   Topic Date Due    COVID-19 Vaccine (1) Never done    Shingles Vaccine (1 of 2) Never done    Colorectal Cancer Screening  2022    Foot Exam  2022    Mammogram  2022

## 2022-08-31 NOTE — LETTER
September 7, 2022    Breanna Silva  [address] [place] [address]             [facility]  [address]  [facility] [address]  Phone: [phone] Dear Shirley [facility] is committed to your overall health.  To help you get the most out of each of your visits, we will review your information to make sure you are up to date on all of your recommended tests and/or procedures.       Nguyễn Nicholas MD  has found that your chart shows you may be due for :         Health Maintenance Due   Topic    COVID-19 Vaccine    Shingles Vaccine     Colorectal Cancer Screening          If you have had any of the above done at another facility, please bring the records or information with you so that your record at [facility] will be complete.  If you would like to schedule any of these test, please call [phone] or send a message via [email or web address].        If you are currently taking medication, please bring it with you to your appointment for review.           Thank you for letting us care for you,        Nguyễn Nicholas MD and your [facility] Team

## 2022-09-07 ENCOUNTER — HOSPITAL ENCOUNTER (OUTPATIENT)
Dept: RADIOLOGY | Facility: HOSPITAL | Age: 72
Discharge: HOME | End: 2022-09-07
Attending: INTERNAL MEDICINE
Payer: MEDICARE

## 2022-09-07 DIAGNOSIS — E04.2 MULTIPLE THYROID NODULES: ICD-10-CM

## 2022-09-07 PROCEDURE — 76536 US EXAM OF HEAD AND NECK: CPT | Mod: TC,PO

## 2022-09-07 PROCEDURE — 76536 US EXAM OF HEAD AND NECK: CPT | Mod: 26,,, | Performed by: RADIOLOGY

## 2022-09-07 PROCEDURE — 76536 US SOFT TISSUE HEAD NECK THYROID: ICD-10-PCS | Mod: 26,,, | Performed by: RADIOLOGY

## 2022-09-14 ENCOUNTER — OFFICE VISIT (OUTPATIENT)
Dept: FAMILY MEDICINE | Facility: CLINIC | Age: 72
End: 2022-09-14
Payer: MEDICARE

## 2022-09-14 VITALS
BODY MASS INDEX: 32.79 KG/M2 | HEIGHT: 60 IN | SYSTOLIC BLOOD PRESSURE: 120 MMHG | DIASTOLIC BLOOD PRESSURE: 72 MMHG | WEIGHT: 167 LBS | HEART RATE: 64 BPM

## 2022-09-14 DIAGNOSIS — J45.20 MILD INTERMITTENT ASTHMA WITHOUT COMPLICATION: Primary | ICD-10-CM

## 2022-09-14 DIAGNOSIS — E78.49 OTHER HYPERLIPIDEMIA: ICD-10-CM

## 2022-09-14 DIAGNOSIS — E04.2 MULTIPLE THYROID NODULES: ICD-10-CM

## 2022-09-14 DIAGNOSIS — E11.9 TYPE 2 DIABETES MELLITUS WITHOUT COMPLICATION, WITHOUT LONG-TERM CURRENT USE OF INSULIN: ICD-10-CM

## 2022-09-14 DIAGNOSIS — J30.2 PERENNIAL ALLERGIC RHINITIS WITH SEASONAL VARIATION: ICD-10-CM

## 2022-09-14 DIAGNOSIS — E03.9 HYPOTHYROIDISM, UNSPECIFIED TYPE: ICD-10-CM

## 2022-09-14 DIAGNOSIS — J30.89 PERENNIAL ALLERGIC RHINITIS WITH SEASONAL VARIATION: ICD-10-CM

## 2022-09-14 DIAGNOSIS — Z01.89 ENCOUNTER FOR LABORATORY TEST: ICD-10-CM

## 2022-09-14 DIAGNOSIS — E66.9 CLASS 1 OBESITY WITH SERIOUS COMORBIDITY AND BODY MASS INDEX (BMI) OF 32.0 TO 32.9 IN ADULT, UNSPECIFIED OBESITY TYPE: ICD-10-CM

## 2022-09-14 PROCEDURE — 3078F DIAST BP <80 MM HG: CPT | Mod: CPTII,S$GLB,, | Performed by: INTERNAL MEDICINE

## 2022-09-14 PROCEDURE — 3074F SYST BP LT 130 MM HG: CPT | Mod: CPTII,S$GLB,, | Performed by: INTERNAL MEDICINE

## 2022-09-14 PROCEDURE — 3061F NEG MICROALBUMINURIA REV: CPT | Mod: CPTII,S$GLB,, | Performed by: INTERNAL MEDICINE

## 2022-09-14 PROCEDURE — 3078F PR MOST RECENT DIASTOLIC BLOOD PRESSURE < 80 MM HG: ICD-10-PCS | Mod: CPTII,S$GLB,, | Performed by: INTERNAL MEDICINE

## 2022-09-14 PROCEDURE — 99214 OFFICE O/P EST MOD 30 MIN: CPT | Mod: S$GLB,,, | Performed by: INTERNAL MEDICINE

## 2022-09-14 PROCEDURE — 3061F PR NEG MICROALBUMINURIA RESULT DOCUMENTED/REVIEW: ICD-10-PCS | Mod: CPTII,S$GLB,, | Performed by: INTERNAL MEDICINE

## 2022-09-14 PROCEDURE — 3008F BODY MASS INDEX DOCD: CPT | Mod: CPTII,S$GLB,, | Performed by: INTERNAL MEDICINE

## 2022-09-14 PROCEDURE — 3066F NEPHROPATHY DOC TX: CPT | Mod: CPTII,S$GLB,, | Performed by: INTERNAL MEDICINE

## 2022-09-14 PROCEDURE — 3066F PR DOCUMENTATION OF TREATMENT FOR NEPHROPATHY: ICD-10-PCS | Mod: CPTII,S$GLB,, | Performed by: INTERNAL MEDICINE

## 2022-09-14 PROCEDURE — 99999 PR PBB SHADOW E&M-EST. PATIENT-LVL III: CPT | Mod: PBBFAC,,, | Performed by: INTERNAL MEDICINE

## 2022-09-14 PROCEDURE — 1125F PR PAIN SEVERITY QUANTIFIED, PAIN PRESENT: ICD-10-PCS | Mod: CPTII,S$GLB,, | Performed by: INTERNAL MEDICINE

## 2022-09-14 PROCEDURE — 1101F PR PT FALLS ASSESS DOC 0-1 FALLS W/OUT INJ PAST YR: ICD-10-PCS | Mod: CPTII,S$GLB,, | Performed by: INTERNAL MEDICINE

## 2022-09-14 PROCEDURE — 99214 PR OFFICE/OUTPT VISIT, EST, LEVL IV, 30-39 MIN: ICD-10-PCS | Mod: S$GLB,,, | Performed by: INTERNAL MEDICINE

## 2022-09-14 PROCEDURE — 3008F PR BODY MASS INDEX (BMI) DOCUMENTED: ICD-10-PCS | Mod: CPTII,S$GLB,, | Performed by: INTERNAL MEDICINE

## 2022-09-14 PROCEDURE — 99999 PR PBB SHADOW E&M-EST. PATIENT-LVL III: ICD-10-PCS | Mod: PBBFAC,,, | Performed by: INTERNAL MEDICINE

## 2022-09-14 PROCEDURE — 1125F AMNT PAIN NOTED PAIN PRSNT: CPT | Mod: CPTII,S$GLB,, | Performed by: INTERNAL MEDICINE

## 2022-09-14 PROCEDURE — 1159F MED LIST DOCD IN RCRD: CPT | Mod: CPTII,S$GLB,, | Performed by: INTERNAL MEDICINE

## 2022-09-14 PROCEDURE — 3044F PR MOST RECENT HEMOGLOBIN A1C LEVEL <7.0%: ICD-10-PCS | Mod: CPTII,S$GLB,, | Performed by: INTERNAL MEDICINE

## 2022-09-14 PROCEDURE — 3074F PR MOST RECENT SYSTOLIC BLOOD PRESSURE < 130 MM HG: ICD-10-PCS | Mod: CPTII,S$GLB,, | Performed by: INTERNAL MEDICINE

## 2022-09-14 PROCEDURE — 3288F PR FALLS RISK ASSESSMENT DOCUMENTED: ICD-10-PCS | Mod: CPTII,S$GLB,, | Performed by: INTERNAL MEDICINE

## 2022-09-14 PROCEDURE — 1159F PR MEDICATION LIST DOCUMENTED IN MEDICAL RECORD: ICD-10-PCS | Mod: CPTII,S$GLB,, | Performed by: INTERNAL MEDICINE

## 2022-09-14 PROCEDURE — 3288F FALL RISK ASSESSMENT DOCD: CPT | Mod: CPTII,S$GLB,, | Performed by: INTERNAL MEDICINE

## 2022-09-14 PROCEDURE — 3044F HG A1C LEVEL LT 7.0%: CPT | Mod: CPTII,S$GLB,, | Performed by: INTERNAL MEDICINE

## 2022-09-14 PROCEDURE — 1101F PT FALLS ASSESS-DOCD LE1/YR: CPT | Mod: CPTII,S$GLB,, | Performed by: INTERNAL MEDICINE

## 2022-09-14 NOTE — PATIENT INSTRUCTIONS
Mild intermittent asthma without complication: use albuterol rescue as needed for wheezing. Can resume advair as needed.     Perennial allergic rhinitis with seasonal variation: claritin and flonase as needed for congestion    Other hyperlipidemia; Maintain low fat high fiber diet, exercise regularly. Weight reduction where indicated.  Cont rosuvastatin and metamucil; try 2x a day at times for metamucil; gummies.   -     Lipid Panel; Future; Expected date: 09/14/2022  -     Comprehensive Metabolic Panel; Future; Expected date: 09/14/2022    Type 2 diabetes mellitus without complication, without long-term current use of insulin: Maintain a low carb diet; monitor CBG's at home; keep a log for review. Cont metformin.   -     Lipid Panel; Future; Expected date: 09/14/2022  -     Hemoglobin A1C; Future; Expected date: 09/14/2022    Hypothyroidism, unspecified type; wants to remain off levothyroxine for now. TSH 4.7; free T4 0.95; thyroid ab's neg.   -     T4, Free; Future; Expected date: 09/14/2022  -     TSH; Future; Expected date: 09/14/2022    Multiple thyroid nodules; no significant change on 9/7/22 US; repeat in 1 yr.     Encounter for laboratory test    Class 1 obesity with serious comorbidity and body mass index (BMI) of 32.0 to 32.9 in adult, unspecified obesity type; Caloric restriction w regular exercise and weight reduction.

## 2022-09-14 NOTE — PROGRESS NOTES
Subjective:       Patient ID: Breanna Silva is a 72 y.o. female.    Chief Complaint: No chief complaint on file.    HPI: Here for reassessment and go over lab work.  Mild intermittent asthma without complication: use albuterol rescue as needed for wheezing. Can resume advair as needed.  Recent flare up 2 weeks ago for couple of days then fine for rescue has been using Advair inhaler.  Advised to go to albuterol 1st as her rescue.  Perennial allergic rhinitis with seasonal variation: claritin and flonase as needed for congestion  Other hyperlipidemia; Maintain low fat high fiber diet, exercise regularly. Weight reduction where indicated.  Cont rosuvastatin and metamucil; try 2x a day at times for metamucil; gummies.  Cholesterol profile:  Cholesterol 167/triglyceride 57/HDL 72/LDL 83.6.  -     Lipid Panel; Future; Expected date: 09/14/2022  -     Comprehensive Metabolic Panel; Future; Expected date: 09/14/2022  Type 2 diabetes mellitus without complication, without long-term current use of insulin: Maintain a low carb diet; monitor CBG's at home; keep a log for review. Cont metformin.  Hemoglobin A1c 6.6 previously 6.5.  Blood sugars have been running in the 130s in the morning and 104-116 around dinner time.  Fasting blood sugar 141.  -     Lipid Panel; Future; Expected date: 09/14/2022  -     Hemoglobin A1C; Future; Expected date: 09/14/2022  Hypothyroidism, unspecified type; wants to remain off levothyroxine for now. TSH 4.7; free T4 0.95; thyroid ab's neg.  Not needing levothyroxine medication.  Has been off for few years.  Has lost some weight.  -     T4, Free; Future; Expected date: 09/14/2022  -     TSH; Future; Expected date: 09/14/2022  Multiple thyroid nodules; no significant change on 9/7/22 US with comparison to 08/03/2021 study.; repeat in 1 yr.  Thyroid nodules x2 no significant change.  Encounter for laboratory test:  Labs reviewed and discussed with patient and ordered for follow-up  Class 1  obesity with serious comorbidity and body mass index (BMI) of 32.0 to 32.9 in adult, unspecified obesity type; Caloric restriction w regular exercise and weight reduction.   Total time 9:08 a.m. through 9:42 a.m..  Greater than 50% of time spent in discussion, counseling, and review.  Medications reviewed and addressed.  Labs reviewed and discussed with patient and ordered for follow-up.  Various different topics were addressed including plan of care.  Thyroid ultrasound reviewed and discussed recommended repeat in 12 months    Review of Systems   Constitutional:  Negative for appetite change and fever.   HENT:  Negative for congestion, postnasal drip, rhinorrhea and sinus pressure.    Eyes:  Negative for discharge and itching.   Respiratory:  Negative for cough, chest tightness, shortness of breath and wheezing.    Cardiovascular:  Negative for chest pain, palpitations and leg swelling.   Gastrointestinal:  Negative for abdominal distention, abdominal pain, blood in stool, constipation, diarrhea, nausea and vomiting.   Endocrine: Negative for polydipsia, polyphagia and polyuria.   Genitourinary:  Negative for dysuria and hematuria.   Musculoskeletal:  Negative for arthralgias and myalgias.   Skin:  Negative for rash.   Allergic/Immunologic: Negative for environmental allergies and food allergies.   Neurological:  Negative for tremors, seizures and syncope.   Hematological:  Negative for adenopathy. Does not bruise/bleed easily.   Psychiatric/Behavioral:  Negative for dysphoric mood. The patient is not nervous/anxious.     Objective:        Vitals:    09/14/22 0841   BP: 120/72   BP Location: Left arm   Pulse: 64   Weight: 75.8 kg (167 lb)   Height: 5' (1.524 m)       BMI Readings from Last 3 Encounters:   09/14/22 32.61 kg/m²   08/08/22 32.59 kg/m²   06/14/22 32.59 kg/m²        Wt Readings from Last 3 Encounters:   09/19/22 1108 75.8 kg (167 lb)   09/14/22 0841 75.8 kg (167 lb)   08/08/22 0914 75.7 kg (166 lb 14.2  oz)        BP Readings from Last 3 Encounters:   22 120/72   22 132/68   22 138/70        There are no preventive care reminders to display for this patient.     Health Maintenance Due   Topic Date Due    COVID-19 Vaccine (1) Never done    Shingles Vaccine (1 of 2) Never done    Colorectal Cancer Screening  2022    Foot Exam  2022         Past Medical History:   Diagnosis Date    Age-related osteoporosis without current pathological fracture     Asthma     Diabetes mellitus, type 2     w wt loss off metformin now; diet controlled.    Hx of colonic polyp 2017    Colonoscopy Giovanni Butler MD    Hypothyroidism     Mild intermittent asthma without complication     on Xolair inj monthly; allergist Dr Nelson.    Osteopenia     Osteopenia     Pre-hypertension     suspected white coat syndrome; also elevated w seeing ortho x2 for pain.    Proteinuria     Pure hypercholesterolemia     Thyroid nodule     Vitamin D insufficiency        Past Surgical History:   Procedure Laterality Date     SECTION      x3    COLONOSCOPY      Dr Butler: 3-4 polyps removed; five-year follow-up recommended    NECK SURGERY  1989    fusion C5-6; 2 discs removed as well.    TOTAL HIP ARTHROPLASTY Right     ; avascular necrosis.    TUBAL LIGATION         Social History     Tobacco Use    Smoking status: Former     Packs/day: 1.00     Years: 30.00     Pack years: 30.00     Types: Cigarettes     Quit date:      Years since quittin.7    Smokeless tobacco: Never   Substance Use Topics    Alcohol use: No     Alcohol/week: 0.0 standard drinks    Drug use: No       Family History   Problem Relation Age of Onset    Arthritis Mother     Cancer Mother         lung ca; smoking.    COPD Mother     Diabetes Maternal Grandmother     Heart disease Maternal Grandfather        Review of patient's allergies indicates:   Allergen Reactions    Flu vaccine -(3 yr+)(pf) Anaphylaxis      Flu  vaccine 1999 pneumonitis & in hospital for 1 month/ ICU for 1 week    Byetta [exenatide]     Pravastatin sodium      elev LFT    Prinivil [lisinopril]      Cough      Avelox [moxifloxacin] Rash       Current Outpatient Medications on File Prior to Visit   Medication Sig Dispense Refill    alendronate (FOSAMAX) 35 MG tablet Take 1 tablet by mouth once a week 15 tablet 3    metFORMIN (GLUCOPHAGE-XR) 500 MG ER 24hr tablet TAKE 1 TABLET BY MOUTH TWICE DAILY WITH MEALS 180 tablet 1    multivitamin (THERAGRAN) per tablet Take 1 tablet by mouth once daily.      rosuvastatin (CRESTOR) 10 MG tablet TAKE 1 TABLET BY MOUTH ONCE DAILY IN THE EVENING 90 tablet 3    acetylcarnitine 500 mg Cap 250 mg by mouth twice a day      albuterol (VENTOLIN HFA) 90 mcg/actuation inhaler Inhale 2 puffs into the lungs every 6 (six) hours as needed for Wheezing or Shortness of Breath. Rescue (Patient not taking: No sig reported) 18 g 3    alpha lipoic acid 200 mg Cap Take 1 capsule by mouth 2 (two) times daily.      azelastine (ASTELIN) 137 mcg (0.1 %) nasal spray 1 spray 2x a day as needed for nasal congestion; generic please 30 mL 2    biotin 1 mg tablet Take 1,000 mcg by mouth once daily.      blood sugar diagnostic (TRUE METRIX GLUCOSE TEST STRIP) Strp USE 1 STRIP TO CHECK GLUCOSE ONCE TO TWICE DAILY 200 each 3    CALCIUM CARBONATE/VITAMIN D3 (CALCIUM 600 + D,3, ORAL) Take 600 mg by mouth 2 (two) times daily.      coenzyme Q10 200 mg capsule Take 200 mg by mouth once daily.      desonide (DESOWEN) 0.05 % cream APPLY TO AFFECTED AREA ON FACE TWICE DAILY AS NEEDED FOR 2 WEEKS ONLY      fluticasone-salmeterol 230-21 mcg/dose (ADVAIR HFA) 230-21 mcg/actuation HFAA inhaler Inhale 2 puffs into the lungs 2 (two) times daily. Controller (Patient not taking: Reported on 9/14/2022) 12 g 2    loratadine (CLARITIN) 10 mg tablet Take 1 tablet (10 mg total) by mouth daily as needed for Allergies (congestion or post nasal drip). Generics please 90 tablet 1     LUTEIN ORAL Take by mouth once daily at 6am.      magnesium oxide (MAG-OX) 400 mg (241.3 mg magnesium) tablet Take 1 tablet (400 mg total) by mouth once daily. 90 tablet 1    meloxicam (MOBIC) 15 MG tablet Take 1 tablet (15 mg total) by mouth once daily. 30 tablet 4    TRUE METRIX AIR GLUCOSE METER [facility] USE AS DIRECTED 1 each 0    UNABLE TO FIND AG Pro - 2 pills twice a day      VITAMIN C 1000 MG tablet Take 1,000 mg by mouth once daily.       No current facility-administered medications on file prior to visit.       Physical Exam  Vitals reviewed.   Constitutional:       Appearance: She is well-developed.   HENT:      Head: Normocephalic and atraumatic.   Neck:      Thyroid: No thyromegaly.      Comments: Thyroid nonpalp.   Cardiovascular:      Rate and Rhythm: Normal rate and regular rhythm.      Heart sounds: Normal heart sounds. No murmur heard.    No gallop.   Pulmonary:      Effort: Pulmonary effort is normal. No respiratory distress.      Breath sounds: Normal breath sounds. No wheezing or rales.   Abdominal:      General: Bowel sounds are normal. There is no distension.      Palpations: Abdomen is soft.      Tenderness: There is no abdominal tenderness. There is no guarding or rebound.   Musculoskeletal:         General: Normal range of motion.      Cervical back: Normal range of motion and neck supple.      Right lower leg: No edema.      Left lower leg: No edema.   Lymphadenopathy:      Cervical: No cervical adenopathy.   Skin:     Findings: No rash.   Neurological:      Mental Status: She is alert and oriented to person, place, and time.      Comments: Moves all 4 extremities fine.   Psychiatric:         Behavior: Behavior normal.         Thought Content: Thought content normal.       Assessment:       1. Mild intermittent asthma without complication    2. Perennial allergic rhinitis with seasonal variation    3. Other hyperlipidemia    4. Type 2 diabetes mellitus without complication, without  long-term current use of insulin    5. Hypothyroidism, unspecified type    6. Multiple thyroid nodules    7. Encounter for laboratory test    8. Class 1 obesity with serious comorbidity and body mass index (BMI) of 32.0 to 32.9 in adult, unspecified obesity type        Plan:       Mild intermittent asthma without complication: use albuterol rescue as needed for wheezing. Can resume advair as needed.  Recent flare up 2 weeks ago for couple of days then fine for rescue has been using Advair inhaler.  Advised to go to albuterol 1st as her rescue.    Perennial allergic rhinitis with seasonal variation: claritin and flonase as needed for congestion    Other hyperlipidemia; Maintain low fat high fiber diet, exercise regularly. Weight reduction where indicated.  Cont rosuvastatin and metamucil; try 2x a day at times for metamucil; gummies.  Cholesterol profile:  Cholesterol 167/triglyceride 57/HDL 72/LDL 83.6.  -     Lipid Panel; Future; Expected date: 09/14/2022  -     Comprehensive Metabolic Panel; Future; Expected date: 09/14/2022    Type 2 diabetes mellitus without complication, without long-term current use of insulin: Maintain a low carb diet; monitor CBG's at home; keep a log for review. Cont metformin.  Hemoglobin A1c 6.6 previously 6.5.  Blood sugars have been running in the 130s in the morning and 104-116 around dinner time.  Fasting blood sugar 141.  -     Lipid Panel; Future; Expected date: 09/14/2022  -     Hemoglobin A1C; Future; Expected date: 09/14/2022    Hypothyroidism, unspecified type; wants to remain off levothyroxine for now. TSH 4.7; free T4 0.95; thyroid ab's neg.  Not needing levothyroxine medication.  Has been off for few years.  Has lost some weight.  -     T4, Free; Future; Expected date: 09/14/2022  -     TSH; Future; Expected date: 09/14/2022    Multiple thyroid nodules; no significant change on 9/7/22 US with comparison to 08/03/2021 study.; repeat in 1 yr.  Thyroid nodules x2 no significant  change.    Encounter for laboratory test:  Labs reviewed and discussed with patient and ordered for follow-up    Class 1 obesity with serious comorbidity and body mass index (BMI) of 32.0 to 32.9 in adult, unspecified obesity type; Caloric restriction w regular exercise and weight reduction.

## 2022-11-07 LAB — CRC RECOMMENDATION EXT: NORMAL

## 2022-11-08 ENCOUNTER — PATIENT OUTREACH (OUTPATIENT)
Dept: ADMINISTRATIVE | Facility: HOSPITAL | Age: 72
End: 2022-11-08
Payer: MEDICARE

## 2022-11-08 NOTE — LETTER
AUTHORIZATION FOR RELEASE OF   CONFIDENTIAL INFORMATION    Dear Tracy Rai OD,    We are seeing Breanna Silva, date of birth 1950, in the clinic at [facility]. Nguyễn Nicholas MD is the patient's PCP. Breanna Silva has an outstanding lab/procedure at the time we reviewed her chart. In order to help keep her health information updated, she has authorized us to request the following medical record(s):        (  )  MAMMOGRAM                                      (  )  COLONOSCOPY      (  )  PAP SMEAR                                          (  )  OUTSIDE LAB RESULTS     (  )  DEXA SCAN                                          (X) DIABETIC EYE EXAM            (  )  FOOT EXAM                                          (  )  ENTIRE RECORD     (  )  OUTSIDE IMMUNIZATIONS                 (  )  _______________         Please fax records to [facility], Ronald Kluchin, MD, [phone]    If you have any questions, please contact Russell Yuen LPN Care Coordinator  at [phone].            Patient Name: Breanna Silva  : 1950  Patient Phone #: [phone]

## 2022-11-09 ENCOUNTER — PATIENT OUTREACH (OUTPATIENT)
Dept: ADMINISTRATIVE | Facility: HOSPITAL | Age: 72
End: 2022-11-09
Payer: MEDICARE

## 2022-12-07 ENCOUNTER — PATIENT OUTREACH (OUTPATIENT)
Dept: ADMINISTRATIVE | Facility: HOSPITAL | Age: 72
End: 2022-12-07
Payer: MEDICARE

## 2022-12-07 NOTE — LETTER
AUTHORIZATION FOR RELEASE OF   CONFIDENTIAL INFORMATION    Dear Giovanni Butler MD,    We are seeing Breanna Silva, date of birth 1950, in the clinic at [facility]. Nguyễn Nicholas MD is the patient's PCP. Breanna Silva has an outstanding lab/procedure at the time we reviewed her chart. In order to help keep her health information updated, she has authorized us to request the following medical record(s):        (  )  MAMMOGRAM                                      (X)  COLONOSCOPY PATH/RECALL      (  )  PAP SMEAR                                          (  )  OUTSIDE LAB RESULTS     (  )  DEXA SCAN                                          (  )  EYE EXAM            (  )  FOOT EXAM                                          (  )  ENTIRE RECORD     (  )  OUTSIDE IMMUNIZATIONS                 (  )  _______________         Please fax records to [facility], Ronald Kluchin, MD, [phone]    If you have any questions, please contact Russell Yuen LPN Care Coordinator  at [phone].            Patient Name: Breanna Silva  : 1950  Patient Phone #: [phone]

## 2022-12-14 ENCOUNTER — OFFICE VISIT (OUTPATIENT)
Dept: FAMILY MEDICINE | Facility: CLINIC | Age: 72
End: 2022-12-14
Payer: MEDICARE

## 2022-12-14 VITALS
HEIGHT: 60 IN | HEART RATE: 81 BPM | OXYGEN SATURATION: 95 % | BODY MASS INDEX: 32.11 KG/M2 | SYSTOLIC BLOOD PRESSURE: 116 MMHG | DIASTOLIC BLOOD PRESSURE: 52 MMHG | WEIGHT: 163.56 LBS

## 2022-12-14 DIAGNOSIS — M54.9 MUSCULOSKELETAL BACK PAIN: ICD-10-CM

## 2022-12-14 DIAGNOSIS — R10.9 RIGHT FLANK PAIN: Primary | ICD-10-CM

## 2022-12-14 DIAGNOSIS — R30.9 PAINFUL URINATION: ICD-10-CM

## 2022-12-14 DIAGNOSIS — R39.15 URINARY URGENCY: ICD-10-CM

## 2022-12-14 PROCEDURE — 3044F PR MOST RECENT HEMOGLOBIN A1C LEVEL <7.0%: ICD-10-PCS | Mod: HCNC,CPTII,S$GLB, | Performed by: NURSE PRACTITIONER

## 2022-12-14 PROCEDURE — 99999 PR PBB SHADOW E&M-EST. PATIENT-LVL IV: ICD-10-PCS | Mod: PBBFAC,HCNC,, | Performed by: NURSE PRACTITIONER

## 2022-12-14 PROCEDURE — 1160F RVW MEDS BY RX/DR IN RCRD: CPT | Mod: HCNC,CPTII,S$GLB, | Performed by: NURSE PRACTITIONER

## 2022-12-14 PROCEDURE — 1159F MED LIST DOCD IN RCRD: CPT | Mod: HCNC,CPTII,S$GLB, | Performed by: NURSE PRACTITIONER

## 2022-12-14 PROCEDURE — 3288F FALL RISK ASSESSMENT DOCD: CPT | Mod: HCNC,CPTII,S$GLB, | Performed by: NURSE PRACTITIONER

## 2022-12-14 PROCEDURE — 1101F PR PT FALLS ASSESS DOC 0-1 FALLS W/OUT INJ PAST YR: ICD-10-PCS | Mod: HCNC,CPTII,S$GLB, | Performed by: NURSE PRACTITIONER

## 2022-12-14 PROCEDURE — 3044F HG A1C LEVEL LT 7.0%: CPT | Mod: HCNC,CPTII,S$GLB, | Performed by: NURSE PRACTITIONER

## 2022-12-14 PROCEDURE — 99213 PR OFFICE/OUTPT VISIT, EST, LEVL III, 20-29 MIN: ICD-10-PCS | Mod: HCNC,S$GLB,, | Performed by: NURSE PRACTITIONER

## 2022-12-14 PROCEDURE — 99999 PR PBB SHADOW E&M-EST. PATIENT-LVL IV: CPT | Mod: PBBFAC,HCNC,, | Performed by: NURSE PRACTITIONER

## 2022-12-14 PROCEDURE — 1125F PR PAIN SEVERITY QUANTIFIED, PAIN PRESENT: ICD-10-PCS | Mod: HCNC,CPTII,S$GLB, | Performed by: NURSE PRACTITIONER

## 2022-12-14 PROCEDURE — 3008F BODY MASS INDEX DOCD: CPT | Mod: HCNC,CPTII,S$GLB, | Performed by: NURSE PRACTITIONER

## 2022-12-14 PROCEDURE — 3061F NEG MICROALBUMINURIA REV: CPT | Mod: HCNC,CPTII,S$GLB, | Performed by: NURSE PRACTITIONER

## 2022-12-14 PROCEDURE — 3074F SYST BP LT 130 MM HG: CPT | Mod: HCNC,CPTII,S$GLB, | Performed by: NURSE PRACTITIONER

## 2022-12-14 PROCEDURE — 99213 OFFICE O/P EST LOW 20 MIN: CPT | Mod: HCNC,S$GLB,, | Performed by: NURSE PRACTITIONER

## 2022-12-14 PROCEDURE — 3074F PR MOST RECENT SYSTOLIC BLOOD PRESSURE < 130 MM HG: ICD-10-PCS | Mod: HCNC,CPTII,S$GLB, | Performed by: NURSE PRACTITIONER

## 2022-12-14 PROCEDURE — 3066F NEPHROPATHY DOC TX: CPT | Mod: HCNC,CPTII,S$GLB, | Performed by: NURSE PRACTITIONER

## 2022-12-14 PROCEDURE — 3008F PR BODY MASS INDEX (BMI) DOCUMENTED: ICD-10-PCS | Mod: HCNC,CPTII,S$GLB, | Performed by: NURSE PRACTITIONER

## 2022-12-14 PROCEDURE — 1101F PT FALLS ASSESS-DOCD LE1/YR: CPT | Mod: HCNC,CPTII,S$GLB, | Performed by: NURSE PRACTITIONER

## 2022-12-14 PROCEDURE — 3061F PR NEG MICROALBUMINURIA RESULT DOCUMENTED/REVIEW: ICD-10-PCS | Mod: HCNC,CPTII,S$GLB, | Performed by: NURSE PRACTITIONER

## 2022-12-14 PROCEDURE — 1159F PR MEDICATION LIST DOCUMENTED IN MEDICAL RECORD: ICD-10-PCS | Mod: HCNC,CPTII,S$GLB, | Performed by: NURSE PRACTITIONER

## 2022-12-14 PROCEDURE — 1160F PR REVIEW ALL MEDS BY PRESCRIBER/CLIN PHARMACIST DOCUMENTED: ICD-10-PCS | Mod: HCNC,CPTII,S$GLB, | Performed by: NURSE PRACTITIONER

## 2022-12-14 PROCEDURE — 3078F PR MOST RECENT DIASTOLIC BLOOD PRESSURE < 80 MM HG: ICD-10-PCS | Mod: HCNC,CPTII,S$GLB, | Performed by: NURSE PRACTITIONER

## 2022-12-14 PROCEDURE — 1125F AMNT PAIN NOTED PAIN PRSNT: CPT | Mod: HCNC,CPTII,S$GLB, | Performed by: NURSE PRACTITIONER

## 2022-12-14 PROCEDURE — 3288F PR FALLS RISK ASSESSMENT DOCUMENTED: ICD-10-PCS | Mod: HCNC,CPTII,S$GLB, | Performed by: NURSE PRACTITIONER

## 2022-12-14 PROCEDURE — 3078F DIAST BP <80 MM HG: CPT | Mod: HCNC,CPTII,S$GLB, | Performed by: NURSE PRACTITIONER

## 2022-12-14 PROCEDURE — 3066F PR DOCUMENTATION OF TREATMENT FOR NEPHROPATHY: ICD-10-PCS | Mod: HCNC,CPTII,S$GLB, | Performed by: NURSE PRACTITIONER

## 2022-12-14 RX ORDER — KETOCONAZOLE 20 MG/G
CREAM TOPICAL
COMMUNITY
Start: 2022-11-01

## 2022-12-14 RX ORDER — NITROFURANTOIN 25; 75 MG/1; MG/1
100 CAPSULE ORAL
COMMUNITY
Start: 2022-12-11 | End: 2022-12-18

## 2022-12-14 RX ORDER — HYDROCORTISONE 25 MG/G
CREAM TOPICAL
COMMUNITY
Start: 2022-11-30

## 2022-12-14 RX ORDER — TIZANIDINE 4 MG/1
4 TABLET ORAL 2 TIMES DAILY PRN
Qty: 30 TABLET | Refills: 0 | Status: SHIPPED | OUTPATIENT
Start: 2022-12-14 | End: 2022-12-29

## 2022-12-14 NOTE — PROGRESS NOTES
Subjective:       Patient ID: Breanna Silva is a 72 y.o. female.    Chief Complaint: Back Pain (Mid right back pain )  First time seeing me in the primary care clinic.  Last seen in primary care by DEENA Nicholas MD on 09/14/2022.    Back Pain  This is a new problem. The current episode started more than 1 month ago. The problem occurs constantly. The problem is unchanged. The quality of the pain is described as aching. The pain does not radiate. The pain is at a severity of 4/10. The pain is Worse during the night. The symptoms are aggravated by lying down and sitting. Stiffness is present In the morning. Risk factors include menopause. She has tried NSAIDs for the symptoms. The treatment provided mild relief.   States mid right back pain for a couple of months. States it awakens at night when sitting and pressing on it or when rolling over in bed and stiff when stands.   Denies fall or injury. Left leg swells when stand for a long time and get really big.   States on Sunday she had a UTI and had nitrofurantoin prescribed and she has been taking it. States symptoms are only slightly better using nitrofurantoin.    Vitals:    12/14/22 1046   BP: (!) 116/52   Pulse: 81     Review of Systems   Musculoskeletal:  Positive for back pain.     Objective:      Physical Exam  Vitals and nursing note reviewed.   Constitutional:       General: She is awake.      Appearance: Normal appearance. She is well-developed and well-groomed.   HENT:      Head: Normocephalic and atraumatic.      Right Ear: Hearing and external ear normal.      Left Ear: Hearing and external ear normal.   Eyes:      General: Lids are normal.         Right eye: No foreign body or discharge.         Left eye: No foreign body or discharge.   Neck:      Trachea: Trachea and phonation normal.   Cardiovascular:      Rate and Rhythm: Normal rate and regular rhythm.   Pulmonary:      Effort: Pulmonary effort is normal.      Breath sounds: Normal breath sounds and  air entry.   Abdominal:      General: Bowel sounds are normal.      Palpations: Abdomen is soft.      Tenderness: There is no abdominal tenderness. There is no right CVA tenderness or left CVA tenderness.   Musculoskeletal:         General: Normal range of motion.      Cervical back: Full passive range of motion without pain, normal range of motion and neck supple.      Thoracic back: Tenderness present.        Back:       Right lower leg: No edema.      Left lower leg: No edema.      Comments: Pain to this area with movement   Skin:     General: Skin is warm.   Neurological:      General: No focal deficit present.      Mental Status: She is alert and oriented to person, place, and time.   Psychiatric:         Attention and Perception: Attention and perception normal.         Mood and Affect: Mood and affect normal.         Speech: Speech normal.         Behavior: Behavior normal. Behavior is cooperative.         Thought Content: Thought content normal.         Cognition and Memory: Cognition and memory normal.         Judgment: Judgment normal.       Assessment & Plan:       Right flank pain  -     Urinalysis, Reflex to Urine Culture Urine, Clean Catch; Future; Expected date: 12/14/2022    Urinary urgency  -     Urinalysis, Reflex to Urine Culture Urine, Clean Catch; Future; Expected date: 12/14/2022    Painful urination  -     Urinalysis, Reflex to Urine Culture Urine, Clean Catch; Future; Expected date: 12/14/2022    Musculoskeletal back pain  -     tiZANidine (ZANAFLEX) 4 MG tablet; Take 1 tablet (4 mg total) by mouth 2 (two) times daily as needed (back musculoskeletal discomfort).  Dispense: 30 tablet; Refill: 0    Instructed to complete antibiotic and if symptoms persists for 24 hours post completion to come in for urinalysis that has been ordered.        Medication List with Changes/Refills   New Medications    TIZANIDINE (ZANAFLEX) 4 MG TABLET    Take 1 tablet (4 mg total) by mouth 2 (two) times daily as  needed (back musculoskeletal discomfort).   Current Medications    ACETYLCARNITINE 500 MG CAP    250 mg by mouth twice a day    ALBUTEROL (VENTOLIN HFA) 90 MCG/ACTUATION INHALER    Inhale 2 puffs into the lungs every 6 (six) hours as needed for Wheezing or Shortness of Breath. Rescue    ALENDRONATE (FOSAMAX) 35 MG TABLET    Take 1 tablet by mouth once a week    ALPHA LIPOIC ACID 200 MG CAP    Take 1 capsule by mouth 2 (two) times daily.    AZELASTINE (ASTELIN) 137 MCG (0.1 %) NASAL SPRAY    1 spray 2x a day as needed for nasal congestion; generic please    BIOTIN 1 MG TABLET    Take 1,000 mcg by mouth once daily.    BLOOD SUGAR DIAGNOSTIC (TRUE METRIX GLUCOSE TEST STRIP) STRP    USE 1 STRIP TO CHECK GLUCOSE ONCE TO TWICE DAILY    CALCIUM CARBONATE/VITAMIN D3 (CALCIUM 600 + D,3, ORAL)    Take 600 mg by mouth 2 (two) times daily.    COENZYME Q10 200 MG CAPSULE    Take 200 mg by mouth once daily.    DESONIDE (DESOWEN) 0.05 % CREAM    APPLY TO AFFECTED AREA ON FACE TWICE DAILY AS NEEDED FOR 2 WEEKS ONLY    FLUTICASONE-SALMETEROL 230-21 MCG/DOSE (ADVAIR HFA) 230-21 MCG/ACTUATION HFAA INHALER    Inhale 2 puffs into the lungs 2 (two) times daily. Controller    HYDROCORTISONE 2.5 % CREAM    APPLY TO AFFECTED AREA 2 TIMES A DAY AS NEEDED FOR UP TO 2 WEEKS    KETOCONAZOLE (NIZORAL) 2 % CREAM    APPLY TO AFFECTED AREA TWICE DAILY AS NEEDED    LORATADINE (CLARITIN) 10 MG TABLET    Take 1 tablet (10 mg total) by mouth daily as needed for Allergies (congestion or post nasal drip). Generics please    LUTEIN ORAL    Take by mouth once daily at 6am.    MAGNESIUM OXIDE (MAG-OX) 400 MG (241.3 MG MAGNESIUM) TABLET    Take 1 tablet (400 mg total) by mouth once daily.    MELOXICAM (MOBIC) 15 MG TABLET    Take 1 tablet (15 mg total) by mouth once daily.    METFORMIN (GLUCOPHAGE-XR) 500 MG ER 24HR TABLET    TAKE 1 TABLET BY MOUTH TWICE DAILY WITH MEALS    MULTIVITAMIN (THERAGRAN) PER TABLET    Take 1 tablet by mouth once daily.     NITROFURANTOIN, MACROCRYSTAL-MONOHYDRATE, (MACROBID) 100 MG CAPSULE    Take 100 mg by mouth.    ROSUVASTATIN (CRESTOR) 10 MG TABLET    TAKE 1 TABLET BY MOUTH ONCE DAILY IN THE EVENING    TRUE METRIX AIR GLUCOSE METER MISC    USE AS DIRECTED    UNABLE TO FIND    AG Pro - 2 pills twice a day    VITAMIN C 1000 MG TABLET    Take 1,000 mg by mouth once daily.      No follow-ups on file.

## 2023-02-07 DIAGNOSIS — Z00.00 ENCOUNTER FOR MEDICARE ANNUAL WELLNESS EXAM: ICD-10-CM

## 2023-02-09 DIAGNOSIS — Z00.00 ENCOUNTER FOR MEDICARE ANNUAL WELLNESS EXAM: ICD-10-CM

## 2023-02-15 DIAGNOSIS — E11.9 TYPE 2 DIABETES MELLITUS WITHOUT COMPLICATION, UNSPECIFIED WHETHER LONG TERM INSULIN USE: ICD-10-CM

## 2023-02-20 ENCOUNTER — PATIENT MESSAGE (OUTPATIENT)
Dept: ADMINISTRATIVE | Facility: HOSPITAL | Age: 73
End: 2023-02-20
Payer: MEDICARE

## 2023-03-01 ENCOUNTER — PES CALL (OUTPATIENT)
Dept: ADMINISTRATIVE | Facility: CLINIC | Age: 73
End: 2023-03-01
Payer: MEDICARE

## 2023-03-06 ENCOUNTER — LAB VISIT (OUTPATIENT)
Dept: LAB | Facility: HOSPITAL | Age: 73
End: 2023-03-06
Attending: INTERNAL MEDICINE
Payer: MEDICARE

## 2023-03-06 DIAGNOSIS — E11.9 TYPE 2 DIABETES MELLITUS WITHOUT COMPLICATION, WITHOUT LONG-TERM CURRENT USE OF INSULIN: ICD-10-CM

## 2023-03-06 DIAGNOSIS — E03.9 HYPOTHYROIDISM, UNSPECIFIED TYPE: ICD-10-CM

## 2023-03-06 DIAGNOSIS — E78.49 OTHER HYPERLIPIDEMIA: ICD-10-CM

## 2023-03-06 LAB
ALBUMIN SERPL BCP-MCNC: 3.8 G/DL (ref 3.5–5.2)
ALP SERPL-CCNC: 55 U/L (ref 55–135)
ALT SERPL W/O P-5'-P-CCNC: 32 U/L (ref 10–44)
ANION GAP SERPL CALC-SCNC: 12 MMOL/L (ref 8–16)
AST SERPL-CCNC: 28 U/L (ref 10–40)
BILIRUB SERPL-MCNC: 0.4 MG/DL (ref 0.1–1)
BUN SERPL-MCNC: 12 MG/DL (ref 8–23)
CALCIUM SERPL-MCNC: 9.5 MG/DL (ref 8.7–10.5)
CHLORIDE SERPL-SCNC: 104 MMOL/L (ref 95–110)
CHOLEST SERPL-MCNC: 154 MG/DL (ref 120–199)
CHOLEST/HDLC SERPL: 2.1 {RATIO} (ref 2–5)
CO2 SERPL-SCNC: 26 MMOL/L (ref 23–29)
CREAT SERPL-MCNC: 0.8 MG/DL (ref 0.5–1.4)
EST. GFR  (NO RACE VARIABLE): >60 ML/MIN/1.73 M^2
ESTIMATED AVG GLUCOSE: 140 MG/DL (ref 68–131)
GLUCOSE SERPL-MCNC: 118 MG/DL (ref 70–110)
HBA1C MFR BLD: 6.5 % (ref 4–5.6)
HDLC SERPL-MCNC: 73 MG/DL (ref 40–75)
HDLC SERPL: 47.4 % (ref 20–50)
LDLC SERPL CALC-MCNC: 60.4 MG/DL (ref 63–159)
NONHDLC SERPL-MCNC: 81 MG/DL
POTASSIUM SERPL-SCNC: 4.7 MMOL/L (ref 3.5–5.1)
PROT SERPL-MCNC: 7.2 G/DL (ref 6–8.4)
SODIUM SERPL-SCNC: 142 MMOL/L (ref 136–145)
TRIGL SERPL-MCNC: 103 MG/DL (ref 30–150)

## 2023-03-06 PROCEDURE — 80061 LIPID PANEL: CPT | Mod: HCNC | Performed by: INTERNAL MEDICINE

## 2023-03-06 PROCEDURE — 80053 COMPREHEN METABOLIC PANEL: CPT | Mod: HCNC | Performed by: INTERNAL MEDICINE

## 2023-03-06 PROCEDURE — 83036 HEMOGLOBIN GLYCOSYLATED A1C: CPT | Mod: HCNC | Performed by: INTERNAL MEDICINE

## 2023-03-06 PROCEDURE — 84439 ASSAY OF FREE THYROXINE: CPT | Mod: HCNC | Performed by: INTERNAL MEDICINE

## 2023-03-06 PROCEDURE — 84443 ASSAY THYROID STIM HORMONE: CPT | Mod: HCNC | Performed by: INTERNAL MEDICINE

## 2023-03-06 PROCEDURE — 36415 COLL VENOUS BLD VENIPUNCTURE: CPT | Mod: HCNC,PO | Performed by: INTERNAL MEDICINE

## 2023-03-07 LAB
T4 FREE SERPL-MCNC: 1.01 NG/DL (ref 0.71–1.51)
TSH SERPL DL<=0.005 MIU/L-ACNC: 3.84 UIU/ML (ref 0.4–4)

## 2023-03-13 ENCOUNTER — OFFICE VISIT (OUTPATIENT)
Dept: FAMILY MEDICINE | Facility: CLINIC | Age: 73
End: 2023-03-13
Payer: MEDICARE

## 2023-03-13 VITALS
HEIGHT: 60 IN | SYSTOLIC BLOOD PRESSURE: 126 MMHG | WEIGHT: 160.81 LBS | DIASTOLIC BLOOD PRESSURE: 70 MMHG | HEART RATE: 84 BPM | BODY MASS INDEX: 31.57 KG/M2 | OXYGEN SATURATION: 96 %

## 2023-03-13 DIAGNOSIS — E78.49 OTHER HYPERLIPIDEMIA: ICD-10-CM

## 2023-03-13 DIAGNOSIS — E83.42 HYPOMAGNESEMIA: ICD-10-CM

## 2023-03-13 DIAGNOSIS — E55.9 VITAMIN D INSUFFICIENCY: ICD-10-CM

## 2023-03-13 DIAGNOSIS — J30.2 PERENNIAL ALLERGIC RHINITIS WITH SEASONAL VARIATION: ICD-10-CM

## 2023-03-13 DIAGNOSIS — R03.0 PRE-HYPERTENSION: ICD-10-CM

## 2023-03-13 DIAGNOSIS — E11.9 TYPE 2 DIABETES MELLITUS WITHOUT COMPLICATION, WITHOUT LONG-TERM CURRENT USE OF INSULIN: ICD-10-CM

## 2023-03-13 DIAGNOSIS — E66.9 CLASS 1 OBESITY WITH SERIOUS COMORBIDITY AND BODY MASS INDEX (BMI) OF 31.0 TO 31.9 IN ADULT, UNSPECIFIED OBESITY TYPE: ICD-10-CM

## 2023-03-13 DIAGNOSIS — J45.20 MILD INTERMITTENT ASTHMA WITHOUT COMPLICATION: Primary | ICD-10-CM

## 2023-03-13 DIAGNOSIS — Z01.89 ENCOUNTER FOR LABORATORY TEST: ICD-10-CM

## 2023-03-13 DIAGNOSIS — E03.9 HYPOTHYROIDISM, UNSPECIFIED TYPE: ICD-10-CM

## 2023-03-13 DIAGNOSIS — M85.89 OSTEOPENIA OF MULTIPLE SITES: ICD-10-CM

## 2023-03-13 DIAGNOSIS — J30.89 PERENNIAL ALLERGIC RHINITIS WITH SEASONAL VARIATION: ICD-10-CM

## 2023-03-13 PROCEDURE — 1160F PR REVIEW ALL MEDS BY PRESCRIBER/CLIN PHARMACIST DOCUMENTED: ICD-10-PCS | Mod: HCNC,CPTII,S$GLB, | Performed by: INTERNAL MEDICINE

## 2023-03-13 PROCEDURE — 99214 OFFICE O/P EST MOD 30 MIN: CPT | Mod: HCNC,S$GLB,, | Performed by: INTERNAL MEDICINE

## 2023-03-13 PROCEDURE — 3044F PR MOST RECENT HEMOGLOBIN A1C LEVEL <7.0%: ICD-10-PCS | Mod: HCNC,CPTII,S$GLB, | Performed by: INTERNAL MEDICINE

## 2023-03-13 PROCEDURE — 1160F RVW MEDS BY RX/DR IN RCRD: CPT | Mod: HCNC,CPTII,S$GLB, | Performed by: INTERNAL MEDICINE

## 2023-03-13 PROCEDURE — 3044F HG A1C LEVEL LT 7.0%: CPT | Mod: HCNC,CPTII,S$GLB, | Performed by: INTERNAL MEDICINE

## 2023-03-13 PROCEDURE — 1159F MED LIST DOCD IN RCRD: CPT | Mod: HCNC,CPTII,S$GLB, | Performed by: INTERNAL MEDICINE

## 2023-03-13 PROCEDURE — 1101F PT FALLS ASSESS-DOCD LE1/YR: CPT | Mod: HCNC,CPTII,S$GLB, | Performed by: INTERNAL MEDICINE

## 2023-03-13 PROCEDURE — 3078F PR MOST RECENT DIASTOLIC BLOOD PRESSURE < 80 MM HG: ICD-10-PCS | Mod: HCNC,CPTII,S$GLB, | Performed by: INTERNAL MEDICINE

## 2023-03-13 PROCEDURE — 1159F PR MEDICATION LIST DOCUMENTED IN MEDICAL RECORD: ICD-10-PCS | Mod: HCNC,CPTII,S$GLB, | Performed by: INTERNAL MEDICINE

## 2023-03-13 PROCEDURE — 3008F BODY MASS INDEX DOCD: CPT | Mod: HCNC,CPTII,S$GLB, | Performed by: INTERNAL MEDICINE

## 2023-03-13 PROCEDURE — 99999 PR PBB SHADOW E&M-EST. PATIENT-LVL V: ICD-10-PCS | Mod: PBBFAC,HCNC,, | Performed by: INTERNAL MEDICINE

## 2023-03-13 PROCEDURE — 1101F PR PT FALLS ASSESS DOC 0-1 FALLS W/OUT INJ PAST YR: ICD-10-PCS | Mod: HCNC,CPTII,S$GLB, | Performed by: INTERNAL MEDICINE

## 2023-03-13 PROCEDURE — 99999 PR PBB SHADOW E&M-EST. PATIENT-LVL V: CPT | Mod: PBBFAC,HCNC,, | Performed by: INTERNAL MEDICINE

## 2023-03-13 PROCEDURE — 3008F PR BODY MASS INDEX (BMI) DOCUMENTED: ICD-10-PCS | Mod: HCNC,CPTII,S$GLB, | Performed by: INTERNAL MEDICINE

## 2023-03-13 PROCEDURE — 3074F PR MOST RECENT SYSTOLIC BLOOD PRESSURE < 130 MM HG: ICD-10-PCS | Mod: HCNC,CPTII,S$GLB, | Performed by: INTERNAL MEDICINE

## 2023-03-13 PROCEDURE — 1126F PR PAIN SEVERITY QUANTIFIED, NO PAIN PRESENT: ICD-10-PCS | Mod: HCNC,CPTII,S$GLB, | Performed by: INTERNAL MEDICINE

## 2023-03-13 PROCEDURE — 3078F DIAST BP <80 MM HG: CPT | Mod: HCNC,CPTII,S$GLB, | Performed by: INTERNAL MEDICINE

## 2023-03-13 PROCEDURE — 1126F AMNT PAIN NOTED NONE PRSNT: CPT | Mod: HCNC,CPTII,S$GLB, | Performed by: INTERNAL MEDICINE

## 2023-03-13 PROCEDURE — 99214 PR OFFICE/OUTPT VISIT, EST, LEVL IV, 30-39 MIN: ICD-10-PCS | Mod: HCNC,S$GLB,, | Performed by: INTERNAL MEDICINE

## 2023-03-13 PROCEDURE — 3074F SYST BP LT 130 MM HG: CPT | Mod: HCNC,CPTII,S$GLB, | Performed by: INTERNAL MEDICINE

## 2023-03-13 PROCEDURE — 3288F PR FALLS RISK ASSESSMENT DOCUMENTED: ICD-10-PCS | Mod: HCNC,CPTII,S$GLB, | Performed by: INTERNAL MEDICINE

## 2023-03-13 PROCEDURE — 3288F FALL RISK ASSESSMENT DOCD: CPT | Mod: HCNC,CPTII,S$GLB, | Performed by: INTERNAL MEDICINE

## 2023-03-13 NOTE — PROGRESS NOTES
Subjective:       Patient ID: Breanna Silva is a 72 y.o. female.    Chief Complaint: Follow-up (Blood work)  HPI:  Patient here today for reassessment as well as review of her lab work  Follow-up  Pertinent negatives include no abdominal pain, arthralgias, chest pain, congestion, coughing, fever, myalgias, nausea, rash or vomiting.   Mild intermittent asthma without complication: uses albuterol rescue from time to time w allergy flareups; Advair inhaler 230/21 at 2 puffs 2x a day helps.  Continued attempts at weight reduction  -     CBC Auto Differential; Future; Expected date: 03/13/2023    Perennial allergic rhinitis with seasonal variation; astelin nasal helps;  can also take Claritin generic 10 mg daily as needed for allergies or congestion.         -     CBC Auto Differential; Future; Expected date: 03/13/2023    Pre-hypertension; Maintain < 2 Gm Na a day diet, and monitor BP at home; keep a log.  Blood pressure here manually 126/70.  127/78 at home.    Other hyperlipidemia; .Maintain low fat high fiber diet, exercise regularly. Weight reduction indicated. Cont rosuvastatin 10 mg daily.  Lipid profile:  Cholesterol 154/triglyceride 103/HDL 73/LDL 60.4  -     Comprehensive Metabolic Panel; Future; Expected date: 03/13/2023  -     Lipid Panel; Future; Expected date: 03/13/2023    Type 2 diabetes mellitus without complication, without long-term current use of insulin; A1c 6.5 improved from 6.6.  Fasting blood sugar 118.  Maintain a low carb diet; monitor CBG's at home; keep a log for review.  Was prescribed fungal cream with steroids and apparently raised her blood sugars to 158-160; once off the steroid cream she was down to 125-130; remain off the steroid anti- fungal cream.  -     Comprehensive Metabolic Panel; Future; Expected date: 03/13/2023  -     Hemoglobin A1C; Future; Expected date: 03/13/2023    Hypothyroidism, unspecified type; TSH better at 3.84 from 4.70; free T4 normal at 1.01; doesn't want to be  on thyroid meds as suspect hair loss from it.  Continue to monitor periodically    Osteopenia multiple sites:  Last DEXA scan 10/26/2020 with left femoral neck T-score -2.3; total hip T-score -1.8; lumbar spine T-score -1.0.  Weight-bearing exercise and continue with alendronate 35 mg weekly.  Calcium with vitamin-D supplementation.  Can update DEXA scan    Vitamin D insufficiency; on vit D 5000 iu every other day.   -     Comprehensive Metabolic Panel; Future; Expected date: 03/13/2023  -     Vitamin D; Future; Expected date: 03/13/2023    Hypomagnesemia; On calcium 600-D on days in between. Has Mg in it at 400 mg.     Encounter for laboratory test: Labs reviewed and discussed with patient and ordered for follow-up.    Obesity:  BMI 31.41; low-fat low carb low-salt diet along with regular exercise and smaller portions should help her weight come down    Total time: 3:55 p.m. through 4:28 p.m..  Greater than 50% of time spent in discussion counseling and review.  Labs reviewed and discussed in ordered for follow-up.  Medications reviewed and addressed.  Various different topics discussed including plan of care.      Review of Systems   Constitutional:  Negative for appetite change and fever.   HENT:  Negative for congestion, postnasal drip, rhinorrhea and sinus pressure.    Eyes:  Negative for discharge and itching.   Respiratory:  Negative for cough, chest tightness, shortness of breath and wheezing.    Cardiovascular:  Negative for chest pain, palpitations and leg swelling.   Gastrointestinal:  Negative for abdominal distention, abdominal pain, blood in stool, constipation, diarrhea, nausea and vomiting.   Endocrine: Negative for polydipsia, polyphagia and polyuria.   Genitourinary:  Negative for dysuria and hematuria.   Musculoskeletal:  Negative for arthralgias and myalgias.   Skin:  Negative for rash.   Allergic/Immunologic: Negative for environmental allergies and food allergies.   Neurological:  Negative for  tremors, seizures and syncope.   Hematological:  Negative for adenopathy. Does not bruise/bleed easily.   Psychiatric/Behavioral:  Negative for dysphoric mood. The patient is not nervous/anxious.     Objective:        Vitals:    23 1449   BP: 126/70   Pulse: 84   SpO2: 96%   Weight: 73 kg (160 lb 13.2 oz)   Height: 5' (1.524 m)       BMI Readings from Last 3 Encounters:   23 31.41 kg/m²   22 31.95 kg/m²   22 32.61 kg/m²        Wt Readings from Last 3 Encounters:   23 1449 73 kg (160 lb 13.2 oz)   22 1046 74.2 kg (163 lb 9.3 oz)   22 1108 75.8 kg (167 lb)        BP Readings from Last 3 Encounters:   23 126/70   22 (!) 116/52   22 120/72        There are no preventive care reminders to display for this patient.     Health Maintenance Due   Topic Date Due    COVID-19 Vaccine (1) Never done    Shingles Vaccine (1 of 2) Never done    Foot Exam  2022    Eye Exam  2023         Past Medical History:   Diagnosis Date    Age-related osteoporosis without current pathological fracture     Asthma     Diabetes mellitus, type 2     w wt loss off metformin now; diet controlled.    Hx of colonic polyp 2017    Colonoscopy Giovanni Butler MD    Hypothyroidism     Mild intermittent asthma without complication     on Xolair inj monthly; allergist Dr Nelson.    Osteopenia     Osteopenia     Pre-hypertension     suspected white coat syndrome; also elevated w seeing ortho x2 for pain.    Proteinuria     Pure hypercholesterolemia     Thyroid nodule     Vitamin D insufficiency        Past Surgical History:   Procedure Laterality Date     SECTION      x3    COLONOSCOPY      Dr Butler: 3-4 polyps removed; five-year follow-up recommended    NECK SURGERY  1989    fusion C5-6; 2 discs removed as well.    TOTAL HIP ARTHROPLASTY Right     ; avascular necrosis.    TUBAL LIGATION         Social History     Tobacco Use    Smoking status: Former      Packs/day: 1.00     Years: 30.00     Pack years: 30.00     Types: Cigarettes     Quit date:      Years since quittin.2    Smokeless tobacco: Never   Substance Use Topics    Alcohol use: No     Alcohol/week: 0.0 standard drinks    Drug use: No       Family History   Problem Relation Age of Onset    Arthritis Mother     Cancer Mother         lung ca; smoking.    COPD Mother     Diabetes Maternal Grandmother     Heart disease Maternal Grandfather        Review of patient's allergies indicates:   Allergen Reactions    Flu vaccine -(3 yr+)(pf) Anaphylaxis      Flu vaccine  pneumonitis & in hospital for 1 month/ ICU for 1 week    Byetta [exenatide]     Pravastatin sodium      elev LFT    Prinivil [lisinopril]      Cough      Avelox [moxifloxacin] Rash       Current Outpatient Medications on File Prior to Visit   Medication Sig Dispense Refill    acetylcarnitine 500 mg Cap 250 mg by mouth twice a day      albuterol (VENTOLIN HFA) 90 mcg/actuation inhaler Inhale 2 puffs into the lungs every 6 (six) hours as needed for Wheezing or Shortness of Breath. Rescue 18 g 3    alendronate (FOSAMAX) 35 MG tablet Take 1 tablet by mouth once a week 15 tablet 3    alpha lipoic acid 200 mg Cap Take 1 capsule by mouth 2 (two) times daily.      azelastine (ASTELIN) 137 mcg (0.1 %) nasal spray 1 spray 2x a day as needed for nasal congestion; generic please 30 mL 2    biotin 1 mg tablet Take 1,000 mcg by mouth once daily.      blood sugar diagnostic (TRUE METRIX GLUCOSE TEST STRIP) Strp USE 1 STRIP TO CHECK GLUCOSE ONCE TO TWICE DAILY 200 each 3    CALCIUM CARBONATE/VITAMIN D3 (CALCIUM 600 + D,3, ORAL) Take 600 mg by mouth 2 (two) times daily.      coenzyme Q10 200 mg capsule Take 200 mg by mouth once daily.      desonide (DESOWEN) 0.05 % cream APPLY TO AFFECTED AREA ON FACE TWICE DAILY AS NEEDED FOR 2 WEEKS ONLY      fluticasone-salmeterol 230-21 mcg/dose (ADVAIR HFA) 230-21 mcg/actuation HFAA inhaler Inhale 2 puffs  into the lungs 2 (two) times daily. Controller 12 g 2    hydrocortisone 2.5 % cream APPLY TO AFFECTED AREA 2 TIMES A DAY AS NEEDED FOR UP TO 2 WEEKS      ketoconazole (NIZORAL) 2 % cream APPLY TO AFFECTED AREA TWICE DAILY AS NEEDED      LUTEIN ORAL Take by mouth once daily at 6am.      magnesium oxide (MAG-OX) 400 mg (241.3 mg magnesium) tablet Take 1 tablet (400 mg total) by mouth once daily. 90 tablet 1    meloxicam (MOBIC) 15 MG tablet Take 1 tablet (15 mg total) by mouth once daily. 30 tablet 4    metFORMIN (GLUCOPHAGE-XR) 500 MG ER 24hr tablet TAKE 1 TABLET BY MOUTH TWICE DAILY WITH MEALS 180 tablet 1    multivitamin (THERAGRAN) per tablet Take 1 tablet by mouth once daily.      rosuvastatin (CRESTOR) 10 MG tablet TAKE 1 TABLET BY MOUTH ONCE DAILY IN THE EVENING 90 tablet 3    TRUE METRIX AIR GLUCOSE METER Misc USE AS DIRECTED 1 each 0    UNABLE TO FIND AG Pro - 2 pills twice a day      VITAMIN C 1000 MG tablet Take 1,000 mg by mouth once daily.      loratadine (CLARITIN) 10 mg tablet Take 1 tablet (10 mg total) by mouth daily as needed for Allergies (congestion or post nasal drip). Generics please (Patient not taking: Reported on 12/14/2022) 90 tablet 1     No current facility-administered medications on file prior to visit.       Physical Exam  Vitals reviewed.   Constitutional:       Appearance: She is well-developed.   HENT:      Head: Normocephalic and atraumatic.   Neck:      Thyroid: No thyromegaly.      Vascular: No carotid bruit.   Cardiovascular:      Rate and Rhythm: Normal rate and regular rhythm.      Heart sounds: Normal heart sounds. No murmur heard.    No gallop.   Pulmonary:      Effort: Pulmonary effort is normal. No respiratory distress.      Breath sounds: Normal breath sounds. No wheezing or rales.   Abdominal:      General: Bowel sounds are normal. There is no distension.      Palpations: Abdomen is soft.      Tenderness: There is no abdominal tenderness. There is no guarding or rebound.    Musculoskeletal:         General: Normal range of motion.      Cervical back: Normal range of motion and neck supple.   Lymphadenopathy:      Cervical: No cervical adenopathy.   Skin:     Findings: No rash.   Neurological:      Mental Status: She is alert and oriented to person, place, and time.      Comments: Moves all 4 extremities fine.   Psychiatric:         Behavior: Behavior normal.         Thought Content: Thought content normal.       Assessment:       1. Mild intermittent asthma without complication    2. Perennial allergic rhinitis with seasonal variation    3. Pre-hypertension    4. Other hyperlipidemia    5. Type 2 diabetes mellitus without complication, without long-term current use of insulin    6. Hypothyroidism, unspecified type    7. Vitamin D insufficiency    8. Hypomagnesemia    9. Encounter for laboratory test    10. Class 1 obesity with serious comorbidity and body mass index (BMI) of 31.0 to 31.9 in adult, unspecified obesity type        Plan:       Mild intermittent asthma without complication: uses albuterol rescue from time to time w allergy flareups; Advair inhaler 230/21 at 2 puffs 2x a day helps.  Continued attempts at weight reduction  -     CBC Auto Differential; Future; Expected date: 03/13/2023    Perennial allergic rhinitis with seasonal variation; astelin nasal helps;  can also take Claritin generic 10 mg daily as needed for allergies or congestion.         -     CBC Auto Differential; Future; Expected date: 03/13/2023    Pre-hypertension; Maintain < 2 Gm Na a day diet, and monitor BP at home; keep a log.  Blood pressure here manually 126/70.  127/78 at home.    Other hyperlipidemia; .Maintain low fat high fiber diet, exercise regularly. Weight reduction indicated. Cont rosuvastatin 10 mg daily.  Lipid profile:  Cholesterol 154/triglyceride 103/HDL 73/LDL 60.4  -     Comprehensive Metabolic Panel; Future; Expected date: 03/13/2023  -     Lipid Panel; Future; Expected date:  03/13/2023    Type 2 diabetes mellitus without complication, without long-term current use of insulin; A1c 6.5 improved from 6.6.  Fasting blood sugar 118.  Maintain a low carb diet; monitor CBG's at home; keep a log for review.  Was prescribed fungal cream with steroids and apparently raised her blood sugars to 158-160; once off the steroid cream she was down to 125-130; remain off the steroid anti- fungal cream.  -     Comprehensive Metabolic Panel; Future; Expected date: 03/13/2023  -     Hemoglobin A1C; Future; Expected date: 03/13/2023    Hypothyroidism, unspecified type; TSH better at 3.84 from 4.70; free T4 normal at 1.01; doesn't want to be on thyroid meds as suspect hair loss from it.  Continue to monitor periodically    Osteopenia multiple sites:  Last DEXA scan 10/26/2020 with left femoral neck T-score -2.3; total hip T-score -1.8; lumbar spine T-score -1.0.  Weight-bearing exercise and continue with alendronate 35 mg weekly.  Calcium with vitamin-D supplementation.  Can update DEXA scan    Vitamin D insufficiency; on vit D 5000 iu every other day.   -     Comprehensive Metabolic Panel; Future; Expected date: 03/13/2023  -     Vitamin D; Future; Expected date: 03/13/2023    Hypomagnesemia; On calcium 600-D on days in between. Has Mg in it at 400 mg.     Encounter for laboratory test: Labs reviewed and discussed with patient and ordered for follow-up.    Obesity:  BMI 31.41; low-fat low carb low-salt diet along with regular exercise and smaller portions should help her weight come down    Total time: 3:55 p.m. through 4:28 p.m..  Greater than 50% of time spent in discussion counseling and review.  Labs reviewed and discussed in ordered for follow-up.  Medications reviewed and addressed.  Various different topics discussed including plan of care.

## 2023-03-13 NOTE — Clinical Note
Please call patient and have her schedule a DEXA scan for update 1 week before her follow-up visit with me.  Previous DEXA scan was 10/26/2020 with severe osteopenia with left femoral neck -2.3.  An updated scan as warranted.  Remind her to continue weight-bearing exercises as well as her alendronate calcium and vitamin-D supplementation.

## 2023-03-13 NOTE — PATIENT INSTRUCTIONS
Mild intermittent asthma without complication: uses rescue from time to time w allergy flareups; Advair inhaler 230/21 at 2 puffs 2x a day helps.   -     CBC Auto Differential; Future; Expected date: 03/13/2023    Perennial allergic rhinitis with seasonal variation; astelin nasal helps; steroid inhaler raised her CBG's recently to 158-160; after stopping 125-130. HgA1C 6.5 down from 6.6. .   -     CBC Auto Differential; Future; Expected date: 03/13/2023    Pre-hypertension; Maintain < 2 Gm Na a day diet, and monitor BP at home; keep a log.     Other hyperlipidemia; .Maintain low fat high fiber diet, exercise regularly. Weight reduction where indicated. Cont rosuvastatin   -     Comprehensive Metabolic Panel; Future; Expected date: 03/13/2023  -     Lipid Panel; Future; Expected date: 03/13/2023    Type 2 diabetes mellitus without complication, without long-term current use of insulin; Maintain a low carb diet; monitor CBG's at home; keep a log for review.   -     Comprehensive Metabolic Panel; Future; Expected date: 03/13/2023  -     Hemoglobin A1C; Future; Expected date: 03/13/2023    Hypothyroidism, unspecified type; TSH better at 3.84; doesn't want to be on thyroid meds as suspect hair loss from it.     Vitamin D insufficiency; on vit D 5000 iu every other day.   -     Comprehensive Metabolic Panel; Future; Expected date: 03/13/2023  -     Vitamin D; Future; Expected date: 03/13/2023    Hypomagnesemia; On calcium 600-D on days in between. Has Mg in it at 400 mg.     Encounter for laboratory test

## 2023-03-24 ENCOUNTER — TELEPHONE (OUTPATIENT)
Dept: FAMILY MEDICINE | Facility: CLINIC | Age: 73
End: 2023-03-24
Payer: MEDICARE

## 2023-03-24 NOTE — TELEPHONE ENCOUNTER
----- Message from Herrera Moody sent at 3/23/2023  3:05 PM CDT -----      Name of Who is Calling:PT          What is the request in detail:PT is requesting the labs she had done on 03/06 be mailed to her as talked about with your office she stated. Please be Advised!          Can the clinic reply by [facility]:no          What Number to Call Back if not in [phone]

## 2023-04-11 ENCOUNTER — PATIENT MESSAGE (OUTPATIENT)
Dept: ADMINISTRATIVE | Facility: HOSPITAL | Age: 73
End: 2023-04-11
Payer: MEDICARE

## 2023-06-27 LAB
LEFT EYE DM RETINOPATHY: NEGATIVE
RIGHT EYE DM RETINOPATHY: NEGATIVE

## 2023-07-06 ENCOUNTER — PATIENT OUTREACH (OUTPATIENT)
Dept: ADMINISTRATIVE | Facility: HOSPITAL | Age: 73
End: 2023-07-06
Payer: MEDICARE

## 2023-07-10 ENCOUNTER — TELEPHONE (OUTPATIENT)
Dept: FAMILY MEDICINE | Facility: CLINIC | Age: 73
End: 2023-07-10
Payer: MEDICARE

## 2023-07-27 ENCOUNTER — HOSPITAL ENCOUNTER (OUTPATIENT)
Dept: RADIOLOGY | Facility: HOSPITAL | Age: 73
Discharge: HOME | End: 2023-07-27
Attending: INTERNAL MEDICINE
Payer: MEDICARE

## 2023-07-27 DIAGNOSIS — M85.89 OSTEOPENIA OF MULTIPLE SITES: ICD-10-CM

## 2023-07-27 DIAGNOSIS — E55.9 VITAMIN D INSUFFICIENCY: ICD-10-CM

## 2023-07-27 PROCEDURE — 77080 DXA BONE DENSITY AXIAL: CPT | Mod: TC,HCNC,PO

## 2023-07-27 PROCEDURE — 77080 DXA BONE DENSITY AXIAL SKELETON 1 OR MORE SITES: ICD-10-PCS | Mod: 26,HCNC,, | Performed by: RADIOLOGY

## 2023-07-27 PROCEDURE — 77080 DXA BONE DENSITY AXIAL: CPT | Mod: 26,HCNC,, | Performed by: RADIOLOGY

## 2023-08-21 DIAGNOSIS — E11.9 TYPE 2 DIABETES MELLITUS WITHOUT COMPLICATION, WITHOUT LONG-TERM CURRENT USE OF INSULIN: ICD-10-CM

## 2023-08-21 NOTE — TELEPHONE ENCOUNTER
Care Due:                  Date            Visit Type   Department     Provider  --------------------------------------------------------------------------------                                EP -                              [facility] FAM[facility]  Last Visit: 03-      CARE ([facility])   MEDICINE       Nguyễn Nciholas                              EP -                              [facility]  Next Visit: 09-      CARE ([facility])   MEDICINE       Angie Robles                                                            Last  Test          Frequency    Reason                     Performed    Due Date  --------------------------------------------------------------------------------    HBA1C.......  6 months...  metFORMIN................  03- 09-    [facility] Embedded Care Due Messages. Reference number: [id number].   8/21/2023 5:38:43 PM CDT

## 2023-08-22 RX ORDER — CALCIUM CITRATE/VITAMIN D3 200MG-6.25
TABLET ORAL
Qty: 200 EACH | Refills: 1 | Status: SHIPPED | OUTPATIENT
Start: 2023-08-22 | End: 2024-02-16 | Stop reason: SDUPTHER

## 2023-08-22 NOTE — TELEPHONE ENCOUNTER
Refill Routing Note   Medication(s) are not appropriate for processing by [facility] for the following reason(s):      No active prescription written by provider    [facility] action(s):  Defer Care Due:  None identified     Medication Therapy Plan: FLOS      Appointments  past 12m or future 3m with PCP    Date Provider   Last Visit   3/13/2023 Nguyễn Nicholas MD   Next Visit   Visit date not found Nguyễn Nicholas MD   ED visits in past 90 days: 0        Note composed:7:44 AM 08/22/2023

## 2023-08-31 ENCOUNTER — PATIENT MESSAGE (OUTPATIENT)
Dept: FAMILY MEDICINE | Facility: CLINIC | Age: 73
End: 2023-08-31
Payer: MEDICARE

## 2023-08-31 DIAGNOSIS — M81.0 OSTEOPOROSIS, UNSPECIFIED OSTEOPOROSIS TYPE, UNSPECIFIED PATHOLOGICAL FRACTURE PRESENCE: Primary | ICD-10-CM

## 2023-09-12 ENCOUNTER — LAB VISIT (OUTPATIENT)
Dept: LAB | Facility: HOSPITAL | Age: 73
End: 2023-09-12
Attending: INTERNAL MEDICINE
Payer: MEDICARE

## 2023-09-12 DIAGNOSIS — E11.9 TYPE 2 DIABETES MELLITUS WITHOUT COMPLICATION, WITHOUT LONG-TERM CURRENT USE OF INSULIN: ICD-10-CM

## 2023-09-12 DIAGNOSIS — E55.9 VITAMIN D INSUFFICIENCY: ICD-10-CM

## 2023-09-12 DIAGNOSIS — E03.9 HYPOTHYROIDISM, UNSPECIFIED TYPE: ICD-10-CM

## 2023-09-12 DIAGNOSIS — J45.20 MILD INTERMITTENT ASTHMA WITHOUT COMPLICATION: ICD-10-CM

## 2023-09-12 DIAGNOSIS — E78.49 OTHER HYPERLIPIDEMIA: ICD-10-CM

## 2023-09-12 DIAGNOSIS — J30.89 PERENNIAL ALLERGIC RHINITIS WITH SEASONAL VARIATION: ICD-10-CM

## 2023-09-12 DIAGNOSIS — J30.2 PERENNIAL ALLERGIC RHINITIS WITH SEASONAL VARIATION: ICD-10-CM

## 2023-09-12 LAB
25(OH)D3+25(OH)D2 SERPL-MCNC: 43 NG/ML (ref 30–96)
ALBUMIN SERPL BCP-MCNC: 3.6 G/DL (ref 3.5–5.2)
ALP SERPL-CCNC: 60 U/L (ref 55–135)
ALT SERPL W/O P-5'-P-CCNC: 24 U/L (ref 10–44)
ANION GAP SERPL CALC-SCNC: 15 MMOL/L (ref 8–16)
AST SERPL-CCNC: 27 U/L (ref 10–40)
BASOPHILS # BLD AUTO: 0.06 K/UL (ref 0–0.2)
BASOPHILS NFR BLD: 0.8 % (ref 0–1.9)
BILIRUB SERPL-MCNC: 0.4 MG/DL (ref 0.1–1)
BUN SERPL-MCNC: 14 MG/DL (ref 8–23)
CALCIUM SERPL-MCNC: 8.9 MG/DL (ref 8.7–10.5)
CHLORIDE SERPL-SCNC: 106 MMOL/L (ref 95–110)
CHOLEST SERPL-MCNC: 232 MG/DL (ref 120–199)
CHOLEST/HDLC SERPL: 3.4 {RATIO} (ref 2–5)
CO2 SERPL-SCNC: 22 MMOL/L (ref 23–29)
CREAT SERPL-MCNC: 0.8 MG/DL (ref 0.5–1.4)
DIFFERENTIAL METHOD: ABNORMAL
EOSINOPHIL # BLD AUTO: 0.2 K/UL (ref 0–0.5)
EOSINOPHIL NFR BLD: 2.5 % (ref 0–8)
ERYTHROCYTE [DISTWIDTH] IN BLOOD BY AUTOMATED COUNT: 13.6 % (ref 11.5–14.5)
EST. GFR  (NO RACE VARIABLE): >60 ML/MIN/1.73 M^2
ESTIMATED AVG GLUCOSE: 137 MG/DL (ref 68–131)
GLUCOSE SERPL-MCNC: 135 MG/DL (ref 70–110)
HBA1C MFR BLD: 6.4 % (ref 4–5.6)
HCT VFR BLD AUTO: 42.4 % (ref 37–48.5)
HDLC SERPL-MCNC: 69 MG/DL (ref 40–75)
HDLC SERPL: 29.7 % (ref 20–50)
HGB BLD-MCNC: 13.6 G/DL (ref 12–16)
IMM GRANULOCYTES # BLD AUTO: 0.01 K/UL (ref 0–0.04)
IMM GRANULOCYTES NFR BLD AUTO: 0.1 % (ref 0–0.5)
LDLC SERPL CALC-MCNC: 150.6 MG/DL (ref 63–159)
LYMPHOCYTES # BLD AUTO: 4 K/UL (ref 1–4.8)
LYMPHOCYTES NFR BLD: 56.1 % (ref 18–48)
MCH RBC QN AUTO: 29.1 PG (ref 27–31)
MCHC RBC AUTO-ENTMCNC: 32.1 G/DL (ref 32–36)
MCV RBC AUTO: 91 FL (ref 82–98)
MONOCYTES # BLD AUTO: 0.5 K/UL (ref 0.3–1)
MONOCYTES NFR BLD: 6.4 % (ref 4–15)
NEUTROPHILS # BLD AUTO: 2.4 K/UL (ref 1.8–7.7)
NEUTROPHILS NFR BLD: 34.1 % (ref 38–73)
NONHDLC SERPL-MCNC: 163 MG/DL
NRBC BLD-RTO: 0 /100 WBC
PLATELET # BLD AUTO: 210 K/UL (ref 150–450)
PMV BLD AUTO: 10.9 FL (ref 9.2–12.9)
POTASSIUM SERPL-SCNC: 4 MMOL/L (ref 3.5–5.1)
PROT SERPL-MCNC: 7.1 G/DL (ref 6–8.4)
RBC # BLD AUTO: 4.68 M/UL (ref 4–5.4)
SODIUM SERPL-SCNC: 143 MMOL/L (ref 136–145)
T4 FREE SERPL-MCNC: 0.91 NG/DL (ref 0.71–1.51)
TRIGL SERPL-MCNC: 62 MG/DL (ref 30–150)
TSH SERPL DL<=0.005 MIU/L-ACNC: 4.39 UIU/ML (ref 0.4–4)
WBC # BLD AUTO: 7.15 K/UL (ref 3.9–12.7)

## 2023-09-12 PROCEDURE — 83036 HEMOGLOBIN GLYCOSYLATED A1C: CPT | Mod: HCNC | Performed by: INTERNAL MEDICINE

## 2023-09-12 PROCEDURE — 85025 COMPLETE CBC W/AUTO DIFF WBC: CPT | Mod: HCNC | Performed by: INTERNAL MEDICINE

## 2023-09-12 PROCEDURE — 80053 COMPREHEN METABOLIC PANEL: CPT | Mod: HCNC | Performed by: INTERNAL MEDICINE

## 2023-09-12 PROCEDURE — 80061 LIPID PANEL: CPT | Mod: HCNC | Performed by: INTERNAL MEDICINE

## 2023-09-12 PROCEDURE — 36415 COLL VENOUS BLD VENIPUNCTURE: CPT | Mod: HCNC,PN | Performed by: INTERNAL MEDICINE

## 2023-09-12 PROCEDURE — 84439 ASSAY OF FREE THYROXINE: CPT | Mod: HCNC | Performed by: INTERNAL MEDICINE

## 2023-09-12 PROCEDURE — 82306 VITAMIN D 25 HYDROXY: CPT | Mod: HCNC | Performed by: INTERNAL MEDICINE

## 2023-09-12 PROCEDURE — 84443 ASSAY THYROID STIM HORMONE: CPT | Mod: HCNC | Performed by: INTERNAL MEDICINE

## 2023-09-19 ENCOUNTER — OFFICE VISIT (OUTPATIENT)
Dept: FAMILY MEDICINE | Facility: CLINIC | Age: 73
End: 2023-09-19
Payer: MEDICARE

## 2023-09-19 VITALS
HEART RATE: 81 BPM | BODY MASS INDEX: 31.64 KG/M2 | OXYGEN SATURATION: 98 % | SYSTOLIC BLOOD PRESSURE: 132 MMHG | WEIGHT: 161.19 LBS | DIASTOLIC BLOOD PRESSURE: 72 MMHG | HEIGHT: 60 IN

## 2023-09-19 DIAGNOSIS — E03.9 HYPOTHYROIDISM, UNSPECIFIED TYPE: ICD-10-CM

## 2023-09-19 DIAGNOSIS — R60.9 FLUID RETENTION: Primary | ICD-10-CM

## 2023-09-19 DIAGNOSIS — Z12.31 SCREENING MAMMOGRAM FOR HIGH-RISK PATIENT: ICD-10-CM

## 2023-09-19 DIAGNOSIS — R21 RASH: ICD-10-CM

## 2023-09-19 DIAGNOSIS — E11.9 TYPE 2 DIABETES MELLITUS WITHOUT COMPLICATION, WITHOUT LONG-TERM CURRENT USE OF INSULIN: ICD-10-CM

## 2023-09-19 PROCEDURE — 3288F FALL RISK ASSESSMENT DOCD: CPT | Mod: HCNC,CPTII,S$GLB, | Performed by: FAMILY MEDICINE

## 2023-09-19 PROCEDURE — 2023F PR DILATED RETINAL EXAM W/O EVID OF RETINOPATHY: ICD-10-PCS | Mod: HCNC,CPTII,S$GLB, | Performed by: FAMILY MEDICINE

## 2023-09-19 PROCEDURE — 99499 UNLISTED E&M SERVICE: CPT | Mod: HCNC,S$GLB,, | Performed by: FAMILY MEDICINE

## 2023-09-19 PROCEDURE — 3044F PR MOST RECENT HEMOGLOBIN A1C LEVEL <7.0%: ICD-10-PCS | Mod: HCNC,CPTII,S$GLB, | Performed by: FAMILY MEDICINE

## 2023-09-19 PROCEDURE — 99214 PR OFFICE/OUTPT VISIT, EST, LEVL IV, 30-39 MIN: ICD-10-PCS | Mod: HCNC,S$GLB,, | Performed by: FAMILY MEDICINE

## 2023-09-19 PROCEDURE — 99999 PR PBB SHADOW E&M-EST. PATIENT-LVL V: ICD-10-PCS | Mod: PBBFAC,HCNC,, | Performed by: FAMILY MEDICINE

## 2023-09-19 PROCEDURE — 1101F PR PT FALLS ASSESS DOC 0-1 FALLS W/OUT INJ PAST YR: ICD-10-PCS | Mod: HCNC,CPTII,S$GLB, | Performed by: FAMILY MEDICINE

## 2023-09-19 PROCEDURE — 3288F PR FALLS RISK ASSESSMENT DOCUMENTED: ICD-10-PCS | Mod: HCNC,CPTII,S$GLB, | Performed by: FAMILY MEDICINE

## 2023-09-19 PROCEDURE — 1101F PT FALLS ASSESS-DOCD LE1/YR: CPT | Mod: HCNC,CPTII,S$GLB, | Performed by: FAMILY MEDICINE

## 2023-09-19 PROCEDURE — 3075F SYST BP GE 130 - 139MM HG: CPT | Mod: HCNC,CPTII,S$GLB, | Performed by: FAMILY MEDICINE

## 2023-09-19 PROCEDURE — 99999 PR PBB SHADOW E&M-EST. PATIENT-LVL V: CPT | Mod: PBBFAC,HCNC,, | Performed by: FAMILY MEDICINE

## 2023-09-19 PROCEDURE — 3075F PR MOST RECENT SYSTOLIC BLOOD PRESS GE 130-139MM HG: ICD-10-PCS | Mod: HCNC,CPTII,S$GLB, | Performed by: FAMILY MEDICINE

## 2023-09-19 PROCEDURE — 1160F RVW MEDS BY RX/DR IN RCRD: CPT | Mod: HCNC,CPTII,S$GLB, | Performed by: FAMILY MEDICINE

## 2023-09-19 PROCEDURE — 1159F MED LIST DOCD IN RCRD: CPT | Mod: HCNC,CPTII,S$GLB, | Performed by: FAMILY MEDICINE

## 2023-09-19 PROCEDURE — 1160F PR REVIEW ALL MEDS BY PRESCRIBER/CLIN PHARMACIST DOCUMENTED: ICD-10-PCS | Mod: HCNC,CPTII,S$GLB, | Performed by: FAMILY MEDICINE

## 2023-09-19 PROCEDURE — 99214 OFFICE O/P EST MOD 30 MIN: CPT | Mod: HCNC,S$GLB,, | Performed by: FAMILY MEDICINE

## 2023-09-19 PROCEDURE — 3044F HG A1C LEVEL LT 7.0%: CPT | Mod: HCNC,CPTII,S$GLB, | Performed by: FAMILY MEDICINE

## 2023-09-19 PROCEDURE — 3078F DIAST BP <80 MM HG: CPT | Mod: HCNC,CPTII,S$GLB, | Performed by: FAMILY MEDICINE

## 2023-09-19 PROCEDURE — 1126F AMNT PAIN NOTED NONE PRSNT: CPT | Mod: HCNC,CPTII,S$GLB, | Performed by: FAMILY MEDICINE

## 2023-09-19 PROCEDURE — 1159F PR MEDICATION LIST DOCUMENTED IN MEDICAL RECORD: ICD-10-PCS | Mod: HCNC,CPTII,S$GLB, | Performed by: FAMILY MEDICINE

## 2023-09-19 PROCEDURE — 3078F PR MOST RECENT DIASTOLIC BLOOD PRESSURE < 80 MM HG: ICD-10-PCS | Mod: HCNC,CPTII,S$GLB, | Performed by: FAMILY MEDICINE

## 2023-09-19 PROCEDURE — 1126F PR PAIN SEVERITY QUANTIFIED, NO PAIN PRESENT: ICD-10-PCS | Mod: HCNC,CPTII,S$GLB, | Performed by: FAMILY MEDICINE

## 2023-09-19 PROCEDURE — 2023F DILAT RTA XM W/O RTNOPTHY: CPT | Mod: HCNC,CPTII,S$GLB, | Performed by: FAMILY MEDICINE

## 2023-09-19 RX ORDER — BETAMETHASONE VALERATE 1.2 MG/G
CREAM TOPICAL 2 TIMES DAILY
Qty: 30 G | Refills: 1 | Status: SHIPPED | OUTPATIENT
Start: 2023-09-19

## 2023-09-19 RX ORDER — ALPRAZOLAM 0.5 MG/1
0.5 TABLET ORAL
COMMUNITY
Start: 2023-08-11

## 2023-09-19 RX ORDER — TORSEMIDE 20 MG/1
20 TABLET ORAL DAILY
Qty: 20 TABLET | Refills: 0 | Status: SHIPPED | OUTPATIENT
Start: 2023-09-19 | End: 2024-01-11 | Stop reason: SDUPTHER

## 2023-09-19 RX ORDER — PRENATAL VIT 91/IRON/FOLIC/DHA 28-975-200
COMBINATION PACKAGE (EA) ORAL 2 TIMES DAILY
Qty: 30 G | Refills: 1 | Status: SHIPPED | OUTPATIENT
Start: 2023-09-19

## 2023-09-19 NOTE — PROGRESS NOTES
Subjective:       Patient ID: Breanna Silva is a 73 y.o. female.    Chief Complaint: Follow-up (Lab results)    Follow-up  Pertinent negatives include no abdominal pain, chest pain, fatigue, fever, headaches, nausea or rash.     Patient in clinic for f/u and review.   Arms: itching spots/rash on her hands/arms, spot on her abdomen and one on the leg x 2.5 weeks. She's tried steroid creams without much relief. No pets. Castor oil seemed to help. Feels like it's marginally better. She was in her garden prior to onset.   Has diuretic to take as needed. Previously on torsemide, estimates once every few months. Last rx several years old.   Labs 2023 rev.     Review of Systems:  Review of Systems   Constitutional:  Negative for activity change, appetite change, fatigue and fever.   Cardiovascular:  Negative for chest pain and palpitations.   Gastrointestinal:  Negative for abdominal pain, constipation, diarrhea and nausea.   Skin:  Negative for pallor and rash.   Neurological:  Negative for dizziness, syncope, light-headedness and headaches.       Objective:     Vitals:    09/19/23 0946   BP: 132/72   Pulse: 81   SpO2: 98%   Weight: 73.1 kg (161 lb 2.5 oz)   Height: 5' (1.524 m)          Physical Exam  Vitals and nursing note reviewed.   Constitutional:       General: She is not in acute distress.     Appearance: Normal appearance. She is well-developed.   HENT:      Head: Normocephalic and atraumatic.   Eyes:      General: No scleral icterus.        Right eye: No discharge.         Left eye: No discharge.      Conjunctiva/sclera: Conjunctivae normal.   Cardiovascular:      Rate and Rhythm: Normal rate.   Pulmonary:      Effort: Pulmonary effort is normal. No respiratory distress.   Abdominal:      Palpations: Abdomen is soft.      Tenderness: There is no guarding.   Musculoskeletal:         General: No deformity or signs of injury.      Cervical back: Neck supple.   Lymphadenopathy:      Cervical: No cervical  adenopathy.   Skin:     General: Skin is warm and dry.      Findings: Rash (patchy, pruritic rash alegria, forearm, stomach) present.   Neurological:      General: No focal deficit present.      Mental Status: She is alert and oriented to person, place, and time.   Psychiatric:         Mood and Affect: Mood normal.         Behavior: Behavior normal.           Assessment & Plan:  Fluid retention  -     torsemide (DEMADEX) 20 MG Tab; Take 1 tablet (20 mg total) by mouth once daily.  Dispense: 20 tablet; Refill: 0    Rash  -     betamethasone valerate 0.1% (VALISONE) 0.1 % Crea; Apply topically 2 (two) times daily.  Dispense: 30 g; Refill: 1  -     terbinafine HCL (LAMISIL) 1 % cream; Apply topically 2 (two) times daily.  Dispense: 30 g; Refill: 1    Screening mammogram for high-risk patient  -     Mammo Digital Screening Bilat; Future; Expected date: 09/19/2023    Hypothyroidism, unspecified type  -     Comprehensive Metabolic Panel; Future; Expected date: 09/19/2023  -     Lipid Panel; Future; Expected date: 09/19/2023  -     TSH; Future; Expected date: 09/19/2023  -     Urinalysis, Reflex to Urine Culture Urine, Clean Catch; Future    Type 2 diabetes mellitus without complication, without long-term current use of insulin  -     Hemoglobin A1C; Future; Expected date: 09/19/2023  -     Microalbumin/Creatinine Ratio, Urine; Future

## 2023-10-04 ENCOUNTER — PATIENT MESSAGE (OUTPATIENT)
Dept: ADMINISTRATIVE | Facility: HOSPITAL | Age: 73
End: 2023-10-04
Payer: MEDICARE

## 2023-11-21 ENCOUNTER — TELEPHONE (OUTPATIENT)
Dept: FAMILY MEDICINE | Facility: CLINIC | Age: 73
End: 2023-11-21

## 2023-11-21 DIAGNOSIS — R92.8 ABNORMAL MAMMOGRAM: Primary | ICD-10-CM

## 2023-11-21 NOTE — TELEPHONE ENCOUNTER
----- Message from Bren Hunter RN sent at 11/21/2023  8:52 AM CST -----  Regarding: orders needed  Hello,    This patient is in need of additional imaging. She will need a left diagnostic mammogram and left breast ultrasound. Could you let Dr. Robles know to place orders in at her convenience? Thank you!    Bren Hunter RN  [facility]  [phone]

## 2023-11-21 NOTE — TELEPHONE ENCOUNTER
I have signed the orders. Once this is booked, please send a message back to Dr. Robles. Dr. Nicholas is still listed as PCP, I would like Dr. Robles to make a decision if she would like to have the patient on her panel.

## 2023-11-22 DIAGNOSIS — E11.9 TYPE 2 DIABETES MELLITUS WITHOUT COMPLICATION, WITHOUT LONG-TERM CURRENT USE OF INSULIN: ICD-10-CM

## 2023-11-24 DIAGNOSIS — R92.8 ABNORMALITY OF LEFT BREAST ON SCREENING MAMMOGRAM: Primary | ICD-10-CM

## 2023-11-24 RX ORDER — METFORMIN HYDROCHLORIDE 500 MG/1
TABLET, EXTENDED RELEASE ORAL
Qty: 180 TABLET | Refills: 0 | Status: SHIPPED | OUTPATIENT
Start: 2023-11-24 | End: 2024-01-11 | Stop reason: SDUPTHER

## 2024-01-04 ENCOUNTER — LAB VISIT (OUTPATIENT)
Dept: LAB | Facility: HOSPITAL | Age: 74
End: 2024-01-04
Attending: FAMILY MEDICINE
Payer: MEDICARE

## 2024-01-04 DIAGNOSIS — E03.9 HYPOTHYROIDISM, UNSPECIFIED TYPE: ICD-10-CM

## 2024-01-04 LAB
CHOLEST SERPL-MCNC: 219 MG/DL (ref 120–199)
CHOLEST/HDLC SERPL: 3 {RATIO} (ref 2–5)
HDLC SERPL-MCNC: 72 MG/DL (ref 40–75)
HDLC SERPL: 32.9 % (ref 20–50)
LDLC SERPL CALC-MCNC: 128 MG/DL (ref 63–159)
NONHDLC SERPL-MCNC: 147 MG/DL
TRIGL SERPL-MCNC: 95 MG/DL (ref 30–150)

## 2024-01-04 PROCEDURE — 36415 COLL VENOUS BLD VENIPUNCTURE: CPT | Mod: HCNC,PO | Performed by: FAMILY MEDICINE

## 2024-01-04 PROCEDURE — 80061 LIPID PANEL: CPT | Mod: HCNC | Performed by: FAMILY MEDICINE

## 2024-01-11 ENCOUNTER — OFFICE VISIT (OUTPATIENT)
Dept: FAMILY MEDICINE | Facility: CLINIC | Age: 74
End: 2024-01-11
Payer: MEDICARE

## 2024-01-11 ENCOUNTER — LAB VISIT (OUTPATIENT)
Dept: LAB | Facility: HOSPITAL | Age: 74
End: 2024-01-11
Attending: FAMILY MEDICINE
Payer: MEDICARE

## 2024-01-11 VITALS
WEIGHT: 158.06 LBS | OXYGEN SATURATION: 96 % | HEART RATE: 79 BPM | DIASTOLIC BLOOD PRESSURE: 66 MMHG | BODY MASS INDEX: 31.03 KG/M2 | SYSTOLIC BLOOD PRESSURE: 130 MMHG | HEIGHT: 60 IN

## 2024-01-11 DIAGNOSIS — J30.2 PERENNIAL ALLERGIC RHINITIS WITH SEASONAL VARIATION: ICD-10-CM

## 2024-01-11 DIAGNOSIS — J30.89 PERENNIAL ALLERGIC RHINITIS WITH SEASONAL VARIATION: ICD-10-CM

## 2024-01-11 DIAGNOSIS — R82.90 BAD ODOR OF URINE: ICD-10-CM

## 2024-01-11 DIAGNOSIS — J45.20 MILD INTERMITTENT ASTHMA WITHOUT COMPLICATION: ICD-10-CM

## 2024-01-11 DIAGNOSIS — E78.49 OTHER HYPERLIPIDEMIA: ICD-10-CM

## 2024-01-11 DIAGNOSIS — E11.9 TYPE 2 DIABETES MELLITUS WITHOUT COMPLICATION, WITHOUT LONG-TERM CURRENT USE OF INSULIN: ICD-10-CM

## 2024-01-11 DIAGNOSIS — R60.9 FLUID RETENTION: ICD-10-CM

## 2024-01-11 DIAGNOSIS — E03.9 HYPOTHYROIDISM, UNSPECIFIED TYPE: ICD-10-CM

## 2024-01-11 DIAGNOSIS — R82.90 BAD ODOR OF URINE: Primary | ICD-10-CM

## 2024-01-11 LAB
BILIRUB UR QL STRIP: NEGATIVE
CLARITY UR REFRACT.AUTO: CLEAR
COLOR UR AUTO: YELLOW
GLUCOSE UR QL STRIP: NEGATIVE
HGB UR QL STRIP: NEGATIVE
KETONES UR QL STRIP: NEGATIVE
LEUKOCYTE ESTERASE UR QL STRIP: NEGATIVE
NITRITE UR QL STRIP: NEGATIVE
PH UR STRIP: 7 [PH] (ref 5–8)
PROT UR QL STRIP: NEGATIVE
SP GR UR STRIP: 1.01 (ref 1–1.03)
URN SPEC COLLECT METH UR: NORMAL

## 2024-01-11 PROCEDURE — 81003 URINALYSIS AUTO W/O SCOPE: CPT | Mod: HCNC | Performed by: FAMILY MEDICINE

## 2024-01-11 PROCEDURE — 3288F FALL RISK ASSESSMENT DOCD: CPT | Mod: HCNC,CPTII,S$GLB, | Performed by: FAMILY MEDICINE

## 2024-01-11 PROCEDURE — 1101F PT FALLS ASSESS-DOCD LE1/YR: CPT | Mod: HCNC,CPTII,S$GLB, | Performed by: FAMILY MEDICINE

## 2024-01-11 PROCEDURE — 3078F DIAST BP <80 MM HG: CPT | Mod: HCNC,CPTII,S$GLB, | Performed by: FAMILY MEDICINE

## 2024-01-11 PROCEDURE — 99214 OFFICE O/P EST MOD 30 MIN: CPT | Mod: HCNC,S$GLB,, | Performed by: FAMILY MEDICINE

## 2024-01-11 PROCEDURE — 99999 PR PBB SHADOW E&M-EST. PATIENT-LVL V: CPT | Mod: PBBFAC,HCNC,, | Performed by: FAMILY MEDICINE

## 2024-01-11 PROCEDURE — 3075F SYST BP GE 130 - 139MM HG: CPT | Mod: HCNC,CPTII,S$GLB, | Performed by: FAMILY MEDICINE

## 2024-01-11 PROCEDURE — 1126F AMNT PAIN NOTED NONE PRSNT: CPT | Mod: HCNC,CPTII,S$GLB, | Performed by: FAMILY MEDICINE

## 2024-01-11 RX ORDER — FLUTICASONE PROPIONATE AND SALMETEROL XINAFOATE 230; 21 UG/1; UG/1
2 AEROSOL, METERED RESPIRATORY (INHALATION) 2 TIMES DAILY
Qty: 12 G | Refills: 2 | Status: SHIPPED | OUTPATIENT
Start: 2024-01-11

## 2024-01-11 RX ORDER — TORSEMIDE 20 MG/1
20 TABLET ORAL DAILY
Qty: 20 TABLET | Refills: 0 | Status: SHIPPED | OUTPATIENT
Start: 2024-01-11 | End: 2025-01-10

## 2024-01-11 RX ORDER — ROSUVASTATIN CALCIUM 10 MG/1
10 TABLET, COATED ORAL NIGHTLY
Qty: 90 TABLET | Refills: 3 | Status: SHIPPED | OUTPATIENT
Start: 2024-01-11

## 2024-01-11 RX ORDER — METFORMIN HYDROCHLORIDE 500 MG/1
500 TABLET, EXTENDED RELEASE ORAL 2 TIMES DAILY WITH MEALS
Qty: 180 TABLET | Refills: 1 | Status: SHIPPED | OUTPATIENT
Start: 2024-01-11

## 2024-01-11 RX ORDER — AZELASTINE 1 MG/ML
SPRAY, METERED NASAL
Qty: 30 ML | Refills: 2 | Status: SHIPPED | OUTPATIENT
Start: 2024-01-11

## 2024-01-11 NOTE — PROGRESS NOTES
Subjective:       Patient ID: Breanna Silva is a 73 y.o. female.    Chief Complaint: Follow-up (Blood work results)    Follow-up  Associated symptoms include congestion. Pertinent negatives include no abdominal pain, chest pain, fatigue, fever, headaches, nausea or rash.     Patient in clinic for f/u and review.   Seeing ortho this morning re knee pain that increased during holidays (cooking).  Trying IF 16:8 which helps a little with weight.   Still having issues with swelling after standing for periods.   Off crestor the last 3-4mos. She stopped it bc she read an article that lowering her cholesterol around the brain can increase risk for dementia.   Sees cards/arena every 6 mos, stress test is up to date.   Labs 2023/24.     The 10-year ASCVD risk score (Gurvinder ANDREWS, et al., 2019) is: 29.8%    Values used to calculate the score:      Age: 73 years      Sex: Female      Is Non- : No      Diabetic: Yes      Tobacco smoker: No      Systolic Blood Pressure: 130 mmHg      Is BP treated: No      HDL Cholesterol: 72 mg/dL      Total Cholesterol: 219 mg/dL     Review of Systems:  Review of Systems   Constitutional:  Negative for fatigue and fever.   HENT:  Positive for congestion. Negative for postnasal drip.    Cardiovascular:  Negative for chest pain and palpitations.   Gastrointestinal:  Negative for abdominal pain, constipation, diarrhea and nausea.   Genitourinary:  Positive for dysuria. Negative for difficulty urinating and frequency (some).   Musculoskeletal:  Positive for back pain (mid-R back).   Skin:  Negative for pallor and rash.   Neurological:  Negative for dizziness, syncope, light-headedness and headaches.       Objective:     Vitals:    01/11/24 0958   BP: 130/66   Pulse: 79   SpO2: 96%   Weight: 71.7 kg (158 lb 1.1 oz)   Height: 5' (1.524 m)          Physical Exam  Vitals and nursing note reviewed.   Constitutional:       General: She is not in acute distress.     Appearance:  Normal appearance. She is well-developed.   HENT:      Head: Normocephalic and atraumatic.   Eyes:      General: No scleral icterus.        Right eye: No discharge.         Left eye: No discharge.      Conjunctiva/sclera: Conjunctivae normal.   Cardiovascular:      Rate and Rhythm: Normal rate.   Pulmonary:      Effort: Pulmonary effort is normal. No respiratory distress.      Breath sounds: Wheezing (light wheeze) present.   Abdominal:      Palpations: Abdomen is soft.      Tenderness: There is no guarding.   Musculoskeletal:         General: No deformity or signs of injury.      Cervical back: Neck supple.   Lymphadenopathy:      Cervical: No cervical adenopathy.   Skin:     General: Skin is warm and dry.      Findings: No rash.   Neurological:      General: No focal deficit present.      Mental Status: She is alert and oriented to person, place, and time.   Psychiatric:         Mood and Affect: Mood normal.         Behavior: Behavior normal.           Assessment & Plan:  Bad odor of urine  -     Urinalysis, Reflex to Urine Culture Urine, Clean Catch; Future    Hypothyroidism, unspecified type  Comments:  subclinical, not currently on supplementation  u/s up to date until fall 2024    Perennial allergic rhinitis with seasonal variation  -     azelastine (ASTELIN) 137 mcg (0.1 %) nasal spray; 1 spray 2x a day as needed for nasal congestion; generic please  Dispense: 30 mL; Refill: 2    Mild intermittent asthma without complication  -     fluticasone-salmeterol 230-21 mcg/dose (ADVAIR HFA) 230-21 mcg/actuation HFAA inhaler; Inhale 2 puffs into the lungs 2 (two) times daily. Controller  Dispense: 12 g; Refill: 2    Type 2 diabetes mellitus without complication, without long-term current use of insulin  -     metFORMIN (GLUCOPHAGE-XR) 500 MG ER 24hr tablet; Take 1 tablet (500 mg total) by mouth 2 (two) times daily with meals.  Dispense: 180 tablet; Refill: 1  -     rosuvastatin (CRESTOR) 10 MG tablet; Take 1 tablet  (10 mg total) by mouth every evening.  Dispense: 90 tablet; Refill: 3    Other hyperlipidemia  -     rosuvastatin (CRESTOR) 10 MG tablet; Take 1 tablet (10 mg total) by mouth every evening.  Dispense: 90 tablet; Refill: 3    Fluid retention  -     torsemide (DEMADEX) 20 MG Tab; Take 1 tablet (20 mg total) by mouth once daily.  Dispense: 20 tablet; Refill: 0

## 2024-01-15 ENCOUNTER — NURSE TRIAGE (OUTPATIENT)
Dept: ADMINISTRATIVE | Facility: CLINIC | Age: 74
End: 2024-01-15
Payer: MEDICARE

## 2024-01-15 DIAGNOSIS — B96.89 ACUTE BACTERIAL SIMPLE CYSTITIS: Primary | ICD-10-CM

## 2024-01-15 DIAGNOSIS — N30.80 ACUTE BACTERIAL SIMPLE CYSTITIS: Primary | ICD-10-CM

## 2024-01-15 NOTE — TELEPHONE ENCOUNTER
Seen in clinic Thurs, discussed UA for urine odor & urinary symptoms. Pt states UA done, has not heard back from clinic. Communication noted on lab results from provider, but pt states no communication.     Still having throbbing at the end of urination & still has strong odor. +R side & R lower back pain.     Dispo-be seen within 24 hrs. Discussed home care, callback symptoms & options to be seen. Declines apt as she states already seen, requesting callback from clinic.     Reason for Disposition   Side (flank) or lower back pain present    Additional Information   Negative: Shock suspected (e.g., cold/pale/clammy skin, too weak to stand, low BP, rapid pulse)   Negative: Sounds like a life-threatening emergency to the triager   Negative: [1] Unable to urinate (or only a few drops) > 4 hours AND [2] bladder feels very full (e.g., palpable bladder or strong urge to urinate)   Negative: [1] Decreased urination and [2] drinking very little AND [2] dehydration suspected (e.g., dark urine, no urine > 12 hours, very dry mouth, very lightheaded)   Negative: Patient sounds very sick or weak to the triager   Negative: Fever > 100.4 F (38.0 C)    Protocols used: Urinary Symptoms-A-AH

## 2024-01-16 RX ORDER — AMOXICILLIN AND CLAVULANATE POTASSIUM 875; 125 MG/1; MG/1
1 TABLET, FILM COATED ORAL 2 TIMES DAILY
Qty: 14 TABLET | Refills: 0 | Status: SHIPPED | OUTPATIENT
Start: 2024-01-16

## 2024-02-15 DIAGNOSIS — E11.9 TYPE 2 DIABETES MELLITUS WITHOUT COMPLICATION, WITHOUT LONG-TERM CURRENT USE OF INSULIN: Primary | ICD-10-CM

## 2024-02-15 NOTE — TELEPHONE ENCOUNTER
----- Message from Kristin Lore sent at 2/15/2024  3:13 PM CST -----  Regarding: Refills  RX Refill Request      Who Called: Pt     Refill or New Rx: Refill     RX Name and Strength: TRUE METRIX GLUCOSE TEST STRIP Strp    How is the patient currently taking it? (ex. 1XDay): 2 x day     Is this a 30 day or 90 day RX: 90    Preferred Pharmacy with phone number:   [id number] - [place], [place] - [address]  [address]  [facility] [address]  Phone: [phone] Fax: [phone]        Local or Mail Order: Local    Ordering Provider: Nguyễn Nicholas MD    Would the patient rather a call back or a response via [facility]? Call back    Best Call Back Number: [phone]      Additional Information:  Please Advise - Thank you

## 2024-02-15 NOTE — TELEPHONE ENCOUNTER
Care Due:                  Date            Visit Type   Department     Provider  --------------------------------------------------------------------------------                                EP -                              PRIMARY      [facility]  Last Visit: 01-      CARE ([facility])   GAYLA Robles                              EP -                              [facility]  Next Visit: 06-      CARE ([facility])   AGYLA Robles                                                            Last  Test          Frequency    Reason                     Performed    Due Date  --------------------------------------------------------------------------------    HBA1C.......  6 months...  metFORMIN................  09- 03-    Health [facility] Embedded Care Due Messages. Reference number: [id number].   2/15/2024 3:31:45 PM CST

## 2024-02-16 NOTE — TELEPHONE ENCOUNTER
Refill Routing Note   Medication(s) are not appropriate for processing by [facility] for the following reason(s):        No active prescription written by provider    [facility] action(s):  Defer   Requires labs : Yes             Appointments  past 12m or future 3m with PCP    Date Provider   Last Visit   1/11/2024 Angie Robles MD   Next Visit   6/25/2024 Angie Robles MD   ED visits in past 90 days: 0        Note composed:10:48 AM 02/16/2024

## 2024-05-31 ENCOUNTER — TELEPHONE (OUTPATIENT)
Dept: FAMILY MEDICINE | Facility: CLINIC | Age: 74
End: 2024-05-31
Payer: MEDICARE

## 2024-05-31 DIAGNOSIS — Z79.899 DRUG THERAPY: Primary | ICD-10-CM

## 2024-05-31 NOTE — TELEPHONE ENCOUNTER
----- Message from Fabi Wetzel sent at 5/31/2024 12:27 PM CDT -----  Type: Needs Medical Advice  Who Called:  pt called   Pharmacy name and phone #:    Best Call Back Number: [phone]  Additional Information: pt is calling the office because she goes to see Dr. Robles every 3 to 6 months. She has an apt on 7/11/2024 but says she haven't been notified about blood work orders. I also don't see any orders in her chart either. Wants to inform doctor she needs orders put in before the appt. Please call back to advise. Thanks

## 2024-06-03 DIAGNOSIS — R21 RASH: ICD-10-CM

## 2024-06-03 RX ORDER — BETAMETHASONE VALERATE 1 MG/G
CREAM TOPICAL 2 TIMES DAILY
Qty: 45 G | Refills: 0 | Status: SHIPPED | OUTPATIENT
Start: 2024-06-03

## 2024-06-03 NOTE — TELEPHONE ENCOUNTER
Care Due:                  Date            Visit Type   Department     Provider  --------------------------------------------------------------------------------                                EP -                              PRIMARY      [facility]  Last Visit: 01-      CARE ([facility])   GAYLA Robles                               -                              [facility]  Next Visit: 07-      CARE ([facility])   GAYLA Robles                                                            Last  Test          Frequency    Reason                     Performed    Due Date  --------------------------------------------------------------------------------    HBA1C.......  6 months...  metFORMIN................  09- 03-    [facility] Embedded Care Due Messages. Reference number: [id number].   6/03/2024 7:10:26 AM CDT

## 2024-06-03 NOTE — TELEPHONE ENCOUNTER
Refill Decision Note   Breanna Silva  is requesting a refill authorization.  Brief Assessment and Rationale for Refill:  Approve     Medication Therapy Plan:  FLOS      Comments:     Note composed:1:09 PM 06/03/2024

## 2024-06-03 NOTE — PATIENT INSTRUCTIONS
- We discussed additional testing to look for evidence of Parkinson's Disease. You can let me know if you want to do the DaTscan or skin biopsy test  - Let me know if you want a referral to physical therapy  - We also discussed the possibility of starting a medication that might help with rest tremor and slowness - carbidopa/levodopa (Sinemet)    Constipation  You should try to defecate daily.  Treatment for constipation includes the following to try and achieve regular bowel movements daily with a soft consistency.   Drink 64 oz of fluids per day.   Eat plenty of high-fiber foods such as fruits, vegetables, and whole grains.  Avoid simple carbohydrates.  Exercise regularly.  Yogurt or probiotics can be helpful.    If this doesn't seem to get you regular enough, you can add any of the following medications on a daily basis:  -Docusate (Colace) 100 mg twice daily.  -Polyethylene glycol (Miralax) 17 g daily  -Magnesium oxide 250 mg once or twice daily    If no defecation after 3 days: Take senna 8.6 mg twice daily  If no defecation after 4 days: Continue the above regimen but add dulcolax suppository  If no defecation after 5 days: you may use an enema.    Once you have a bowel movement you may go back to your typical bowel regimen.       Mild intermittent asthma without complication; not needing her rescue; uses advair BID    Other hyperlipidemia.Maintain low fat high fiber diet, exercise regularly. Weight reduction where indicated. Cont rosuvastatin  -     Lipid panel; Future; Expected date: 01/21/2020  -     Comprehensive metabolic panel; Future; Expected date: 01/21/2020    Type 2 diabetes mellitus without complication, without long-term current use of insulin  -     Hemoglobin A1c; Future; Expected date: 01/21/2020  -     Urinalysis; Future; Expected date: 01/21/2020  -     Comprehensive metabolic panel; Future; Expected date: 01/21/2020    Multiple thyroid nodules; TSH needed as well w labs; US thyroid w f/u.   -     US Soft Tissue Head Neck Thyroid; Future; Expected date: 01/21/2020  -     T4, free; Future; Expected date: 01/21/2020  -     T3, free; Future; Expected date: 01/21/2020    Hypothyroidism, unspecified type; wants to stay off levothyroxine as suspect led to hair loss. TSH cont to improve  -     Lipid panel; Future; Expected date: 01/21/2020  -     TSH; Future; Expected date: 01/21/2020  -     T4, free; Future; Expected date: 01/21/2020  -     T3, free; Future; Expected date: 01/21/2020    Osteopenia, unspecified location.Weight bearing exercises, continue calcium and vit D supplements. DEXA scabn at 2 yr intervals as needed.  -     Vitamin D; Future; Expected date: 01/21/2020    Vitamin D insufficiency  -     Vitamin D; Future; Expected date: 01/21/2020

## 2024-06-11 LAB
LEFT EYE DM RETINOPATHY: NEGATIVE
RIGHT EYE DM RETINOPATHY: NEGATIVE

## 2024-07-02 ENCOUNTER — LAB VISIT (OUTPATIENT)
Dept: LAB | Facility: HOSPITAL | Age: 74
End: 2024-07-02
Attending: FAMILY MEDICINE
Payer: MEDICARE

## 2024-07-02 DIAGNOSIS — Z79.899 DRUG THERAPY: ICD-10-CM

## 2024-07-02 LAB
ALBUMIN SERPL BCP-MCNC: 3.6 G/DL (ref 3.5–5.2)
ALP SERPL-CCNC: 62 U/L (ref 55–135)
ALT SERPL W/O P-5'-P-CCNC: 17 U/L (ref 10–44)
ANION GAP SERPL CALC-SCNC: 10 MMOL/L (ref 8–16)
AST SERPL-CCNC: 22 U/L (ref 10–40)
BASOPHILS # BLD AUTO: 0.06 K/UL (ref 0–0.2)
BASOPHILS NFR BLD: 1.1 % (ref 0–1.9)
BILIRUB SERPL-MCNC: 0.3 MG/DL (ref 0.1–1)
BUN SERPL-MCNC: 14 MG/DL (ref 8–23)
CALCIUM SERPL-MCNC: 9.2 MG/DL (ref 8.7–10.5)
CHLORIDE SERPL-SCNC: 107 MMOL/L (ref 95–110)
CHOLEST SERPL-MCNC: 177 MG/DL (ref 120–199)
CHOLEST/HDLC SERPL: 2.5 {RATIO} (ref 2–5)
CO2 SERPL-SCNC: 25 MMOL/L (ref 23–29)
CREAT SERPL-MCNC: 0.8 MG/DL (ref 0.5–1.4)
DIFFERENTIAL METHOD BLD: ABNORMAL
EOSINOPHIL # BLD AUTO: 0.1 K/UL (ref 0–0.5)
EOSINOPHIL NFR BLD: 2.2 % (ref 0–8)
ERYTHROCYTE [DISTWIDTH] IN BLOOD BY AUTOMATED COUNT: 12.8 % (ref 11.5–14.5)
EST. GFR  (NO RACE VARIABLE): >60 ML/MIN/1.73 M^2
ESTIMATED AVG GLUCOSE: 131 MG/DL (ref 68–131)
GLUCOSE SERPL-MCNC: 135 MG/DL (ref 70–110)
HBA1C MFR BLD: 6.2 % (ref 4–5.6)
HCT VFR BLD AUTO: 42.3 % (ref 37–48.5)
HDLC SERPL-MCNC: 72 MG/DL (ref 40–75)
HDLC SERPL: 40.7 % (ref 20–50)
HGB BLD-MCNC: 13.6 G/DL (ref 12–16)
IMM GRANULOCYTES # BLD AUTO: 0.01 K/UL (ref 0–0.04)
IMM GRANULOCYTES NFR BLD AUTO: 0.2 % (ref 0–0.5)
LDLC SERPL CALC-MCNC: 94 MG/DL (ref 63–159)
LYMPHOCYTES # BLD AUTO: 2.7 K/UL (ref 1–4.8)
LYMPHOCYTES NFR BLD: 50.7 % (ref 18–48)
MCH RBC QN AUTO: 28.6 PG (ref 27–31)
MCHC RBC AUTO-ENTMCNC: 32.2 G/DL (ref 32–36)
MCV RBC AUTO: 89 FL (ref 82–98)
MONOCYTES # BLD AUTO: 0.4 K/UL (ref 0.3–1)
MONOCYTES NFR BLD: 7.3 % (ref 4–15)
NEUTROPHILS # BLD AUTO: 2.1 K/UL (ref 1.8–7.7)
NEUTROPHILS NFR BLD: 38.5 % (ref 38–73)
NONHDLC SERPL-MCNC: 105 MG/DL
NRBC BLD-RTO: 0 /100 WBC
PLATELET # BLD AUTO: 219 K/UL (ref 150–450)
PMV BLD AUTO: 11 FL (ref 9.2–12.9)
POTASSIUM SERPL-SCNC: 4.2 MMOL/L (ref 3.5–5.1)
PROT SERPL-MCNC: 6.8 G/DL (ref 6–8.4)
RBC # BLD AUTO: 4.76 M/UL (ref 4–5.4)
SODIUM SERPL-SCNC: 142 MMOL/L (ref 136–145)
T4 FREE SERPL-MCNC: 0.89 NG/DL (ref 0.71–1.51)
TRIGL SERPL-MCNC: 55 MG/DL (ref 30–150)
TSH SERPL DL<=0.005 MIU/L-ACNC: 4.43 UIU/ML (ref 0.4–4)
WBC # BLD AUTO: 5.37 K/UL (ref 3.9–12.7)

## 2024-07-02 PROCEDURE — 84439 ASSAY OF FREE THYROXINE: CPT | Mod: HCNC | Performed by: FAMILY MEDICINE

## 2024-07-02 PROCEDURE — 80053 COMPREHEN METABOLIC PANEL: CPT | Mod: HCNC | Performed by: FAMILY MEDICINE

## 2024-07-02 PROCEDURE — 36415 COLL VENOUS BLD VENIPUNCTURE: CPT | Mod: HCNC,PO | Performed by: FAMILY MEDICINE

## 2024-07-02 PROCEDURE — 85025 COMPLETE CBC W/AUTO DIFF WBC: CPT | Mod: HCNC | Performed by: FAMILY MEDICINE

## 2024-07-02 PROCEDURE — 80061 LIPID PANEL: CPT | Mod: HCNC | Performed by: FAMILY MEDICINE

## 2024-07-02 PROCEDURE — 83036 HEMOGLOBIN GLYCOSYLATED A1C: CPT | Mod: HCNC | Performed by: FAMILY MEDICINE

## 2024-07-02 PROCEDURE — 84443 ASSAY THYROID STIM HORMONE: CPT | Mod: HCNC | Performed by: FAMILY MEDICINE

## 2024-07-11 ENCOUNTER — OFFICE VISIT (OUTPATIENT)
Dept: FAMILY MEDICINE | Facility: CLINIC | Age: 74
End: 2024-07-11
Payer: MEDICARE

## 2024-07-11 VITALS
SYSTOLIC BLOOD PRESSURE: 118 MMHG | HEART RATE: 80 BPM | BODY MASS INDEX: 30.55 KG/M2 | DIASTOLIC BLOOD PRESSURE: 74 MMHG | OXYGEN SATURATION: 97 % | HEIGHT: 60 IN | WEIGHT: 155.63 LBS

## 2024-07-11 DIAGNOSIS — R30.0 DYSURIA: Primary | ICD-10-CM

## 2024-07-11 DIAGNOSIS — B96.89 ACUTE BACTERIAL SIMPLE CYSTITIS: ICD-10-CM

## 2024-07-11 DIAGNOSIS — G89.29 CHRONIC RIGHT-SIDED LOW BACK PAIN WITHOUT SCIATICA: ICD-10-CM

## 2024-07-11 DIAGNOSIS — M54.50 CHRONIC RIGHT-SIDED LOW BACK PAIN WITHOUT SCIATICA: ICD-10-CM

## 2024-07-11 DIAGNOSIS — E03.9 HYPOTHYROIDISM, UNSPECIFIED TYPE: ICD-10-CM

## 2024-07-11 DIAGNOSIS — E11.9 TYPE 2 DIABETES MELLITUS WITHOUT COMPLICATION, WITHOUT LONG-TERM CURRENT USE OF INSULIN: ICD-10-CM

## 2024-07-11 DIAGNOSIS — N30.80 ACUTE BACTERIAL SIMPLE CYSTITIS: ICD-10-CM

## 2024-07-11 PROCEDURE — 99214 OFFICE O/P EST MOD 30 MIN: CPT | Mod: HCNC,S$GLB,, | Performed by: FAMILY MEDICINE

## 2024-07-11 PROCEDURE — 3074F SYST BP LT 130 MM HG: CPT | Mod: HCNC,CPTII,S$GLB, | Performed by: FAMILY MEDICINE

## 2024-07-11 PROCEDURE — 99999 PR PBB SHADOW E&M-EST. PATIENT-LVL III: CPT | Mod: PBBFAC,HCNC,, | Performed by: FAMILY MEDICINE

## 2024-07-11 PROCEDURE — 3078F DIAST BP <80 MM HG: CPT | Mod: HCNC,CPTII,S$GLB, | Performed by: FAMILY MEDICINE

## 2024-07-11 PROCEDURE — 1159F MED LIST DOCD IN RCRD: CPT | Mod: HCNC,CPTII,S$GLB, | Performed by: FAMILY MEDICINE

## 2024-07-11 PROCEDURE — 3044F HG A1C LEVEL LT 7.0%: CPT | Mod: HCNC,CPTII,S$GLB, | Performed by: FAMILY MEDICINE

## 2024-07-11 PROCEDURE — 1160F RVW MEDS BY RX/DR IN RCRD: CPT | Mod: HCNC,CPTII,S$GLB, | Performed by: FAMILY MEDICINE

## 2024-07-11 PROCEDURE — 1101F PT FALLS ASSESS-DOCD LE1/YR: CPT | Mod: HCNC,CPTII,S$GLB, | Performed by: FAMILY MEDICINE

## 2024-07-11 PROCEDURE — 1126F AMNT PAIN NOTED NONE PRSNT: CPT | Mod: HCNC,CPTII,S$GLB, | Performed by: FAMILY MEDICINE

## 2024-07-11 PROCEDURE — 3288F FALL RISK ASSESSMENT DOCD: CPT | Mod: HCNC,CPTII,S$GLB, | Performed by: FAMILY MEDICINE

## 2024-07-11 RX ORDER — NITROFURANTOIN 25; 75 MG/1; MG/1
100 CAPSULE ORAL 2 TIMES DAILY
Qty: 10 CAPSULE | Refills: 0 | Status: SHIPPED | OUTPATIENT
Start: 2024-07-11

## 2024-07-11 NOTE — PROGRESS NOTES
Subjective:       Patient ID: Breanna Silva is a 73 y.o. female.    Chief Complaint: Follow-up (Blood work results)    Follow-up  Associated symptoms include congestion. Pertinent negatives include no abdominal pain, chest pain, fatigue, fever, headaches, nausea or rash.     Patient seen for f/u and lab review.   She stopped crestor after the last visit 6mos ago as she started getting cramps in her legs. The sx did resolve once she stopped the statin.   Recalls that she was on levothyroxine, but did not like taking it as she felt it changed her hair texture and causing thinning. Thus discontinued. Some constipation that resolves with consistent use of metamucil.   Labs 2024 rev.   Discussed PT for her lower back - she declined for now, but will consider in the future.     Review of Systems:  Review of Systems   Constitutional:  Negative for fatigue and fever.   HENT:  Positive for congestion. Negative for postnasal drip.    Cardiovascular:  Negative for chest pain, palpitations and leg swelling (some swelling to LLE after prolonged standing - improves overnight).   Gastrointestinal:  Negative for abdominal pain, constipation, diarrhea and nausea.   Genitourinary:  Positive for dysuria (had 1 episode of burning). Negative for difficulty urinating and frequency (some).   Musculoskeletal:  Positive for back pain (mid-R back).   Skin:  Negative for pallor and rash.   Neurological:  Negative for dizziness, syncope, light-headedness and headaches.       Objective:     Vitals:    07/11/24 1042   BP: 118/74   Pulse: 80   SpO2: 97%   Weight: 70.6 kg (155 lb 10.3 oz)   Height: 5' (1.524 m)          Physical Exam  Vitals and nursing note reviewed.   Constitutional:       General: She is not in acute distress.     Appearance: Normal appearance. She is well-developed.   HENT:      Head: Normocephalic and atraumatic.   Eyes:      General: No scleral icterus.        Right eye: No discharge.         Left eye: No discharge.       Conjunctiva/sclera: Conjunctivae normal.   Cardiovascular:      Rate and Rhythm: Normal rate.   Pulmonary:      Effort: Pulmonary effort is normal. No respiratory distress.   Skin:     General: Skin is warm and dry.   Neurological:      General: No focal deficit present.      Mental Status: She is alert and oriented to person, place, and time.   Psychiatric:         Mood and Affect: Mood normal.         Behavior: Behavior normal.           Assessment & Plan:  Dysuria  -     Urinalysis, Reflex to Urine Culture Urine, Clean Catch    Type 2 diabetes mellitus without complication, without long-term current use of insulin  Comments:  controlled, cont regimen  Orders:  -     Comprehensive Metabolic Panel; Future; Expected date: 07/11/2024  -     Hemoglobin A1C; Future; Expected date: 07/11/2024  -     Lipid Panel; Future; Expected date: 07/11/2024    Hypothyroidism, unspecified type  -     TSH; Future; Expected date: 07/11/2024    Acute bacterial simple cystitis  -     nitrofurantoin, macrocrystal-monohydrate, (MACROBID) 100 MG capsule; Take 1 capsule (100 mg total) by mouth 2 (two) times daily.  Dispense: 10 capsule; Refill: 0    Chronic right-sided low back pain without sciatica  Comments:  prev eval with back/spine in 2020, no recent PT - discussed prn  reviewed otcs/nsaids  recommend home stretches, nighttime advil prn

## 2024-07-12 ENCOUNTER — PATIENT OUTREACH (OUTPATIENT)
Dept: ADMINISTRATIVE | Facility: HOSPITAL | Age: 74
End: 2024-07-12
Payer: MEDICARE

## 2024-07-12 NOTE — LETTER
AUTHORIZATION FOR RELEASE OF   CONFIDENTIAL INFORMATION    Dear [facility],    We are seeing Breanna Silva, date of birth 1950, in the clinic at [facility]. Angie Robles MD is the patient's PCP. Breanna Silva has an outstanding lab/procedure at the time we reviewed her chart. In order to help keep her health information updated, she has authorized us to request the following medical record(s):        (  )  MAMMOGRAM                                      (  )  COLONOSCOPY      (  )  PAP SMEAR                                          (  )  OUTSIDE LAB RESULTS     (  )  DEXA SCAN                                          (X)  EYE EXAM            (  )  FOOT EXAM                                          (  )  ENTIRE RECORD     (  )  OUTSIDE IMMUNIZATIONS                 (  )  _______________         Please fax records to [facility], Elder, Lauren S., MD, [phone].    If you have any questions, please contact Russell Yuen LPN Care Coordinator  at [phone].            Patient Name: Breanna Silva  : 1950  Patient Phone #: [phone]                           Breanna Silva  MRN: [id number]  : 1950  Age: 73 y.o.  Sex: female         Patient/Legal Guardian Signature  This signature was collected at 2024    Breanna Silva     Self  _______________________________   Printed Name/Relationship to Patient      Consent for Examination and Treatment: I hereby authorize the providers and employees of [facility] ([facility]) to provide medical treatment/services which includes, but is not limited to, performing and administering tests and diagnostic procedures that are deemed necessary, including, but not limited to, imaging examinations, blood tests and other laboratory procedures as may be required by the hospital, clinic, or may be ordered by my physician(s) or persons working under the general and/or special instructions of my  physician(s).      I understand and agree that this consent covers all authorized persons, including but not limited to physicians, residents, nurse practitioners, physicians' assistants, specialists, consultants, student nurses, and independently contracted physicians, who are called upon by the physician in charge, to carry out the diagnostic procedures and medical or surgical treatment.     I hereby authorize [facility] to retain or dispose of any specimens or tissue, should there be such remaining from any test or procedure.     I hereby authorize and give consent for [facility] providers and employees to take photographs, images or videotapes of such diagnostic, surgical or treatment procedures of Patient as may be required by [facility] or as may be ordered by a physician. I further acknowledge and agree that [facility] may use cameras or other devices for patient monitoring.     I am aware that the practice of medicine is not an exact science, and I acknowledge that no guarantees have been made to me as to the outcome of any tests, procedures or treatment.     Authorization for Release of Information: I understand that my insurance company and/or their agents may need information necessary to make determinations about payment/reimbursement. I hereby provide authorization to release to all insurance companies, their successors, assignees, other parties with whom they may have contracted, or others acting on their behalf, that are involved with payment for any hospital and/or clinic charges incurred by the patient, any information that they request and deem necessary for payment/reimbursement, and/or quality review.  I further authorize the release of my health information to physicians or other health care practitioners on staff who are involved in my health care now and in the future, and to other health care providers, entities, or institutions for the purpose of my continued care and treatment, including referrals.      REGISTRATION AUTHORIZATION  Form No. 68681 (Rev. 3/25/2024)    Page 1 of 3                       Medicare Patient's Certification and Authorization to Release Information and Payment Request:  I certify that the information given by me in applying for payment under Title XVIII of the Social Security Act is correct. I authorize any fatima of medical or other information about me to release to the Social SecurityAdministration, or its intermediaries or carriers, any information needed for this or a related Medicare claim. I request that payment of authorized benefits be made on my behalf.     Assignment of Insurance Benefits:   I hereby authorize any and all insurance companies, health plans, defined   benefit plans, health insurers or any entity that is or may be responsible for payment of my medical expenses to pay all hospital and medical benefits now due, and to become due and payable to me under any hospital benefits, sick benefits, injury benefits or any other benefit for services rendered to me, including Major Medical Benefits, direct to [facility] and all independently contracted physicians. I assign any and all rights that I may have against any and all insurance companies, health plans, defined benefit plans, health insurers or any entity that is or may be responsible for payment of my medical expenses, including, but not limited to any right to appeal a denial of a claim, any right to bring any action, lawsuit, administrative proceeding, or other cause of action on my behalf. I specifically assign my right to pursue litigation against any and all insurance companies, health plans, defined benefit plans, health insurers or any entity that is or may be responsible for payment of my medical expenses based upon a refusal to pay charges.            E. Valuables: It is understood and agreed that [facility] is not liable for the damage to or loss of any money, jewelry,   documents, dentures, eye glasses, hearing  aids, prosthetics, or other property of value.     F. Computer Equipment: I understand and agree that should I choose to use computer equipment owned by [facility] or if I choose to access the Internet via [facility]s network, I do so at my own risk. [facility] is not responsible for any damage to my computer equipment or to any damages of any type that might arise from my loss of equipment or data.     G. Acceptance of Financial Responsibility:  I agree that in consideration of the services and   supplies that have been   or will be furnished to the patient, I am hereby obligated to pay all charges made for or on the account of the patient according to the standard rates (in effect at the time the services and supplies are delivered) established by [facility], including its Patient Financial Assistance Policy to the extent it is applicable. I understand that I am responsible for all charges, or portions thereof, not covered by insurance or other sources. Patient refunds will be distributed only after balances at all [facility] facilities are paid.     H. Communication Authorization:  I hereby authorize [facility] and its representatives, along with any billing service   or  who may work on their behalf, to contact me on   my cell phone and/or home phone using pre- recorded messages, artificial voice messages, automatic telephone dialing devices or other computer assisted technology, or by electronic      mail, text messaging, or by any other form of electronic communication. This includes, but is not limited to, appointment reminders, yearly physical exam reminders, preventive care reminders, patient campaigns, welcome calls, and calls about account balances on my account or any account on which I am listed as a guarantor. I understand I have the right to opt out of these communications at any time.      Relationship  Between  Facility and  Provider:      I understand that some, but not all, providers furnishing  services to the patient are not employees or agents of [facility]. The patient is under the care and supervision of his/her attending physician, and it is the responsibility of the facility and its nursing staff to carry out the instructions of such physicians. It is the responsibility of the patient's physician/designee to obtain the patient's informed consent, when required, for medical or surgical treatment, special diagnostic or therapeutic procedures, or hospital services rendered for the patient under the special instructions of the physician/designee.           REGISTRATION AUTHORIZATION  Form No. 37276 (Rev. 3/25/2024)    Page 2 of 3                       Immunizations: [facility] shares immunization information with state sponsored health departments to help you and your doctor keep track of your immunization records. By signing, you consent to have this information shared with the health department in your state:                                [place] - LINKS ([place] Immunization Network for Kids Statewide)                                [place] - MIIX ([place] Immunization Information eXchange)                                [place] - ImmPRINT (Immunization Patient Registry with Integrated Technology)     TERM: This authorization is valid for this and subsequent care/treatment I receive at [facility] and will remain valid unless/until revoked in writing by me.     [facility]: As used in this document, [facility] means all [facility] owned and managed facilities, including, but not limited to, all health centers, surgery centers, clinics, urgent care centers, and hospitals.         [facility] complies with applicable Federal civil rights laws and does not discriminate on the basis of race, color, national origin, age, disability, or sex.  ATENCIÓN: si mora luque, tiene a hernandez disposición servicios gratuitos de asistencia lingüística. Gladys berry [phone].  [facility] Ý: N?u b?n nói  Ti?ng Vi?t, có các d?ch v? h? tr? ngôn ng? mi?n phí dành cho b?n. G?i s? [phone].        REGISTRATION AUTHORIZATION  Form No. 89272 (Rev. 3/25/2024)   Page 3 of 3

## 2024-07-12 NOTE — PROGRESS NOTES
Population Health Chart Review & Patient Outreach Details      Additional [facility] Notes:               Updates Requested / Reviewed:      Updated Care Coordination Note, , Care Team Updated, and Immunizations Reconciliation Completed or Queried: [facility] Topics Overdue:      [facility] Score: 2     Eye Exam  Foot Exam    Shingles/Zoster Vaccine  RSV Vaccine                  Health Maintenance Topic(s) Outreach Outcomes & Actions Taken:    Eye Exam - Outreach Outcomes & Actions Taken  : External Records Requested & Care Team Updated if Applicable

## 2024-07-21 ENCOUNTER — PATIENT OUTREACH (OUTPATIENT)
Dept: ADMINISTRATIVE | Facility: HOSPITAL | Age: 74
End: 2024-07-21
Payer: MEDICARE

## 2024-07-22 NOTE — PROGRESS NOTES
Population Health Chart Review & Patient Outreach Details      Additional [facility] Notes:               Updates Requested / Reviewed:      Updated Care Coordination Note         Health Maintenance Topics Overdue:      [facility] Score: 1     Foot Exam    Shingles/Zoster Vaccine  RSV Vaccine                  Health Maintenance Topic(s) Outreach Outcomes & Actions Taken:    Eye Exam - Outreach Outcomes & Actions Taken  : Diabetic Eye External Records Uploaded, Care Team & History Updated if Applicable

## 2024-08-29 DIAGNOSIS — E11.9 TYPE 2 DIABETES MELLITUS WITHOUT COMPLICATION, WITHOUT LONG-TERM CURRENT USE OF INSULIN: ICD-10-CM

## 2024-08-29 NOTE — TELEPHONE ENCOUNTER
Refill Decision Note   Breanna Silva  is requesting a refill authorization.  Brief Assessment and Rationale for Refill:  Approve     Medication Therapy Plan:         Comments:     Note composed:3:05 PM 08/29/2024

## 2024-08-29 NOTE — TELEPHONE ENCOUNTER
No care due was identified.  Health [facility] Embedded Care Due Messages. Reference number: [id number].   8/29/2024 6:42:22 AM CDT

## 2024-09-04 DIAGNOSIS — E11.9 TYPE 2 DIABETES MELLITUS WITHOUT COMPLICATION, WITHOUT LONG-TERM CURRENT USE OF INSULIN: ICD-10-CM

## 2024-09-04 NOTE — TELEPHONE ENCOUNTER
----- Message from Monserrat Paz sent at 9/4/2024  4:12 PM CDT -----  Regarding: Refill  Type:  RX Refill Request    Who Called: Pt    Refill or New Rx:refill    RX Name and Strength:TRUE METRIX AIR GLUCOSE METER [facility]     Preferred Pharmacy with phone number:  [facility] [address]  [address]  [facility] [address]  Phone: [phone] Fax: [phone]    Local or Mail Order:Local    Would the patient rather a call back or a response via [facility]? Call back    Best Call Back Number:[phone]    Additional Information:Thank you

## 2024-09-04 NOTE — TELEPHONE ENCOUNTER
No care due was identified.  Health [facility] Embedded Care Due Messages. Reference number: [id number].   9/04/2024 4:33:52 PM CDT

## 2024-09-05 NOTE — TELEPHONE ENCOUNTER
Quick DC. Inappropriate Request    Refill Authorization Note   Breanna Silva  is requesting a refill authorization.  Brief Assessment and Rationale for Refill:  Quick Discontinue  Medication Therapy Plan:  E-Prescribing Status: Receipt confirmed by pharmacy (8/29/2024  3:05 PM CDT)    Medication Reconciliation Completed:  No      Comments:     Note composed:11:26 PM 09/04/2024

## 2024-09-09 RX ORDER — INSULIN PUMP SYRINGE, 3 ML
EACH MISCELLANEOUS
Qty: 1 EACH | Refills: 0 | Status: SHIPPED | OUTPATIENT
Start: 2024-09-09 | End: 2025-09-09

## 2024-09-09 NOTE — TELEPHONE ENCOUNTER
Juan David Almonte 
 
 
 [address] [place] [address] [address] 
[phone] Patient: Gretchen Pisano MRN: [id number] QVS:[birth year] Visit Information Date & Time Provider Department Dept. Phone Encounter #  
 2/6/2018 10:40 AM Rosio Recinos MD [facility] [id number] Follow-up Instructions Return in about 2 weeks (around 2/20/2018), or if symptoms worsen or fail to improve. Your Appointments 2/9/2018  8:40 AM  
ROUTINE CARE with Rosio Recinos MD  
[address] [facility] Appt Note: 4 mnth fu  HTN/Diabetes [address] [address]  
[place] [address] [address] Upcoming Health Maintenance Date Due  
 EYE EXAM RETINAL OR DILATED Q1 6/4/2016 FOOT EXAM Q1 3/9/2017 GLAUCOMA SCREENING Q2Y 6/4/2017 Influenza Age 5 to Adult 8/1/2017 MICROALBUMIN Q1 10/25/2017 HEMOGLOBIN A1C Q6M 12/7/2017 LIPID PANEL Q1 6/7/2018 MEDICARE YEARLY EXAM 6/8/2018 DTaP/Tdap/Td series (2 - Td) 7/6/2026 Allergies as of 2/6/2018  Review Complete On: 2/6/2018 By: Teddy Elkins No Known Allergies Current Immunizations  Reviewed on 10/1/2010 Name Date Influenza Vaccine (Quad) PF 10/25/2016 Pneumococcal Polysaccharide (PPSV-23) 3/9/2016 TD Vaccine 8/17/1998 Tdap 7/6/2016 ZZZ-RETIRED (DO NOT USE) Pneumococcal Vaccine (Unspecified Type) 8/17/1998 Not reviewed this visit You Were Diagnosed With   
  
 Codes Comments Pneumonia due to infectious organism, unspecified laterality, unspecified part of lung    -  Primary ICD-10-CM: J18.9 ICD-9-CM: 136.9, 484.8 Type 2 diabetes mellitus with complication, with long-term current use of insulin (HCC)     ICD-10-CM: E11.8, Z79.4 ICD-9-CM: 250.90, V58.67 Essential hypertension     ICD-10-CM: I10 
ICD-9-CM: 401.9 Please pend the meter and supplies needed - lancets, strips, etc - along with how often she tests.    Pure hypercholesterolemia     ICD-10-CM: E78.00 ICD-9-CM: 272.0 Vitals BP Pulse Temp Resp Height(growth percentile) Weight(growth percentile) 138/55 (BP 1 Location: Left arm, BP Patient Position: Sitting) 65 97.2 °F (36.2 °C) (Oral) 20 5' 9\" (1.753 m) 197 lb 3.2 oz (89.4 kg) SpO2 BMI Smoking Status 99% 29.12 kg/m2 Never Smoker Vitals History BMI and BSA Data Body Mass Index Body Surface Area  
 29.12 kg/m 2 2.09 m 2 Preferred Pharmacy Pharmacy Name Phone [address], [facility] [phone] Your Updated Medication List  
  
   
This list is accurate as of: 2/6/18 11:42 AM.  Always use your most recent med list.  
  
  
  
  
 aspirin 325 mg tablet Commonly known as:  ASPIRIN Take 1 Tab by mouth daily. benzonatate 200 mg capsule Commonly known as:  TESSALON Take 1 Cap by mouth three (3) times daily as needed for Cough for up to 10 days. cefPROZIL 500 mg tablet Commonly known as:  CEFZIL Take 1 Tab by mouth two (2) times a day for 10 days. glipiZIDE SR 10 mg CR tablet Commonly known as:  GLUCOTROL XL  
TAKE 1 TABLET DAILY pravastatin 20 mg tablet Commonly known as:  PRAVACHOL  
TAKE 1 TABLET DAILY SITagliptin-metFORMIN 50-1,000 mg per tablet Commonly known as:  JANUMET  
TAKE 1 TABLET TWICE A DAY WITH MEALS Prescriptions Sent to Pharmacy Refills  
 cefPROZIL (CEFZIL) 500 mg tablet 0 Sig: Take 1 Tab by mouth two (2) times a day for 10 days. Class: Normal  
 Pharmacy: [address], [address] Ph #: [phone] Route: Oral  
 benzonatate (TESSALON) 200 mg capsule 0 Sig: Take 1 Cap by mouth three (3) times daily as needed for Cough for up to 10 days. Class: Normal  
 Pharmacy: [address], [address] Ph #: [phone]  Route: Oral  
 We Performed the Following AMB POC HEMOGLOBIN A1C [01041 CPT(R)] AMB POC URINALYSIS DIP STICK AUTO W/O MICRO [86223 CPT(R)] AMB POC URINE, MICROALBUMIN, SEMIQUANT (3 RESULTS) [13971 CPT(R)] Follow-up Instructions Return in about 2 weeks (around 2/20/2018), or if symptoms worsen or fail to improve. To-Do List   
 02/06/2018 Imaging:  XR CHEST PA LAT Patient Instructions Learning About Diabetes Food Guidelines Your Care Instructions Meal planning is important to manage diabetes. It helps keep your blood sugar at a target level (which you set with your doctor). You don't have to eat special foods. You can eat what your family eats, including sweets once in a while. But you do have to pay attention to how often you eat and how much you eat of certain foods. You may want to work with a dietitian or a certified diabetes educator (CDE) to help you plan meals and snacks. A dietitian or CDE can also help you lose weight if that is one of your goals. What should you know about eating carbs? Managing the amount of carbohydrate (carbs) you eat is an important part of healthy meals when you have diabetes. Carbohydrate is found in many foods. · Learn which foods have carbs. And learn the amounts of carbs in different foods. ¨ Bread, cereal, pasta, and rice have about 15 grams of carbs in a serving. A serving is 1 slice of bread (1 ounce), ½ cup of cooked cereal, or 1/3 cup of cooked pasta or rice. ¨ Fruits have 15 grams of carbs in a serving. A serving is 1 small fresh fruit, such as an apple or orange; ½ of a banana; ½ cup of cooked or canned fruit; ½ cup of fruit juice; 1 cup of melon or raspberries; or 2 tablespoons of dried fruit. ¨ Milk and no-sugar-added yogurt have 15 grams of carbs in a serving. A serving is 1 cup of milk or 2/3 cup of no-sugar-added yogurt. ¨ Starchy vegetables have 15 grams of carbs in a serving.  A serving is ½ cup of mashed potatoes or sweet potato; 1 cup winter squash; ½ of a small baked potato; ½ cup of cooked beans; or ½ cup cooked corn or green peas. · Learn how much carbs to eat each day and at each meal. A dietitian or CDE can teach you how to keep track of the amount of carbs you eat. This is called carbohydrate counting. · If you are not sure how to count carbohydrate grams, use the Plate Method to plan meals. It is a good, quick way to make sure that you have a balanced meal. It also helps you spread carbs throughout the day. ¨ Divide your plate by types of foods. Put non-starchy vegetables on half the plate, meat or other protein food on one-quarter of the plate, and a grain or starchy vegetable in the final quarter of the plate. To this you can add a small piece of fruit and 1 cup of milk or yogurt, depending on how many carbs you are supposed to eat at a meal. 
· Try to eat about the same amount of carbs at each meal. Do not \"save up\" your daily allowance of carbs to eat at one meal. 
· Proteins have very little or no carbs per serving. Examples of proteins are beef, chicken, turkey, fish, eggs, tofu, cheese, cottage cheese, and peanut butter. A serving size of meat is 3 ounces, which is about the size of a deck of cards. Examples of meat substitute serving sizes (equal to 1 ounce of meat) are 1/4 cup of cottage cheese, 1 egg, 1 tablespoon of peanut butter, and ½ cup of tofu. How can you eat out and still eat healthy? · Learn to estimate the serving sizes of foods that have carbohydrate. If you measure food at home, it will be easier to estimate the amount in a serving of restaurant food. · If the meal you order has too much carbohydrate (such as potatoes, corn, or baked beans), ask to have a low-carbohydrate food instead. Ask for a salad or green vegetables. · If you use insulin, check your blood sugar before and after eating out to help you plan how much to eat in the future. · If you eat more carbohydrate at a meal than you had planned, take a walk or do other exercise. This will help lower your blood sugar. What else should you know? · Limit saturated fat, such as the fat from meat and dairy products. This is a healthy choice because people who have diabetes are at higher risk of heart disease. So choose lean cuts of meat and nonfat or low-fat dairy products. Use olive or canola oil instead of butter or shortening when cooking. · Don't skip meals. Your blood sugar may drop too low if you skip meals and take insulin or certain medicines for diabetes. · Check with your doctor before you drink alcohol. Alcohol can cause your blood sugar to drop too low. Alcohol can also cause a bad reaction if you take certain diabetes medicines. Follow-up care is a key part of your treatment and safety. Be sure to make and go to all appointments, and call your doctor if you are having problems. It's also a good idea to know your test results and keep a list of the medicines you take. Where can you learn more? Go to http://sepidhe-sol.info/. Enter D635 in the search box to learn more about \"Learning About Diabetes Food Guidelines. \" Current as of: March 13, 2017 Content Version: 11.4 © 7350-3103 Healthwise, Incorporated. Care instructions adapted under license by [employer] (which disclaims liability or warranty for this information). If you have questions about a medical condition or this instruction, always ask your healthcare professional. [place] [address] any warranty or liability for your use of this information. Introducing [facility]! Eloy Mclean introduces [employer] patient portal. Now you can access parts of your medical record, email your doctor's office, and request medication refills online. 1. In your internet browser, go to [email or web address] 2. Click on the First Time User? Click Here link in the Sign In box. You will see the New Member Sign Up page. 3. Enter your [employer] Access Code exactly as it appears below. You will not need to use this code after youve completed the sign-up process. If you do not sign up before the expiration date, you must request a new code. · [employer] Access Code: [email or web address] Expires: 5/7/2018 10:12 AM 
 
4. Enter the last four digits of your Social Security Number (xxxx) and Date of Birth (mm/dd/yyyy) as indicated and click Submit. You will be taken to the next sign-up page. 5. Create a [employer] ID. This will be your [employer] login ID and cannot be changed, so think of one that is secure and easy to remember. 6. Create a [employer] password. You can change your password at any time. 7. Enter your Password Reset Question and Answer. This can be used at a later time if you forget your password. 8. Enter your e-mail address. You will receive e-mail notification when new information is available in [address] 9. Click Sign Up. You can now view and download portions of your medical record. 10. Click the Download Summary menu link to download a portable copy of your medical information. If you have questions, please visit the Frequently Asked Questions section of the [employer] website. Remember, [employer] is NOT to be used for urgent needs. For medical emergencies, dial 911. Now available from your iPhone and Android! Please provide this summary of care documentation to your next provider. If you have any questions after today's visit, please call [phone].

## 2024-09-09 NOTE — TELEPHONE ENCOUNTER
No care due was identified.  [facility] Embedded Care Due Messages. Reference number: [id number].   9/09/2024 11:11:23 AM CDT

## 2024-10-08 ENCOUNTER — OFFICE VISIT (OUTPATIENT)
Dept: FAMILY MEDICINE | Facility: CLINIC | Age: 74
End: 2024-10-08
Payer: MEDICARE

## 2024-10-08 VITALS
HEIGHT: 60 IN | BODY MASS INDEX: 30.49 KG/M2 | SYSTOLIC BLOOD PRESSURE: 132 MMHG | DIASTOLIC BLOOD PRESSURE: 80 MMHG | OXYGEN SATURATION: 96 % | HEART RATE: 86 BPM | WEIGHT: 155.31 LBS

## 2024-10-08 DIAGNOSIS — E04.2 MULTIPLE THYROID NODULES: ICD-10-CM

## 2024-10-08 DIAGNOSIS — R06.2 WHEEZING: ICD-10-CM

## 2024-10-08 DIAGNOSIS — J45.21 MILD INTERMITTENT ASTHMA WITH (ACUTE) EXACERBATION: ICD-10-CM

## 2024-10-08 DIAGNOSIS — J06.9 UPPER RESPIRATORY INFECTION, ACUTE: Primary | ICD-10-CM

## 2024-10-08 DIAGNOSIS — R09.81 NASAL SINUS CONGESTION: ICD-10-CM

## 2024-10-08 DIAGNOSIS — R05.1 ACUTE COUGH: ICD-10-CM

## 2024-10-08 PROCEDURE — 99999 PR PBB SHADOW E&M-EST. PATIENT-LVL V: CPT | Mod: PBBFAC,HCNC,, | Performed by: NURSE PRACTITIONER

## 2024-10-08 RX ORDER — PREDNISONE 20 MG/1
20 TABLET ORAL 2 TIMES DAILY
Qty: 10 TABLET | Refills: 0 | Status: SHIPPED | OUTPATIENT
Start: 2024-10-08 | End: 2024-10-13

## 2024-10-08 RX ORDER — BENZONATATE 200 MG/1
200 CAPSULE ORAL 3 TIMES DAILY PRN
Qty: 30 CAPSULE | Refills: 0 | Status: SHIPPED | OUTPATIENT
Start: 2024-10-08 | End: 2024-10-18

## 2024-10-08 RX ORDER — DOXYCYCLINE 100 MG/1
100 CAPSULE ORAL 2 TIMES DAILY
Qty: 14 CAPSULE | Refills: 0 | Status: SHIPPED | OUTPATIENT
Start: 2024-10-08 | End: 2024-10-15

## 2024-10-08 NOTE — PROGRESS NOTES
THIS DOCUMENT WAS MADE IN PART WITH VOICE RECOGNITION SOFTWARE.  OCCASIONALLY THIS SOFTWARE WILL MISINTERPRET WORDS OR PHRASES.     Assessment and Plan:    Upper respiratory infection, acute  -     doxycycline (VIBRAMYCIN) 100 MG Cap; Take 1 capsule (100 mg total) by mouth 2 (two) times daily. for 7 days  Dispense: 14 capsule; Refill: 0    Mild intermittent asthma with (acute) exacerbation  -     predniSONE (DELTASONE) 20 MG tablet; Take 1 tablet (20 mg total) by mouth 2 (two) times daily. for 5 days  Dispense: 10 tablet; Refill: 0    Wheezing  -     predniSONE (DELTASONE) 20 MG tablet; Take 1 tablet (20 mg total) by mouth 2 (two) times daily. for 5 days  Dispense: 10 tablet; Refill: 0    Acute cough  -     predniSONE (DELTASONE) 20 MG tablet; Take 1 tablet (20 mg total) by mouth 2 (two) times daily. for 5 days  Dispense: 10 tablet; Refill: 0  -     benzonatate (TESSALON) 200 MG capsule; Take 1 capsule (200 mg total) by mouth 3 (three) times daily as needed for Cough.  Dispense: 30 capsule; Refill: 0    Nasal sinus congestion    Multiple thyroid nodules  -     US Soft Tissue Head Neck; Future; Expected date: 10/08/2024             Visit summary:    Presenting signs and symptoms suggestive of URI further complicated by asthma exacerbation. Covered with doxycycline.  Short course of prednisone prescribed. We discussed symptomatic management with OTC meds and saline rinses. Will add Flonase and Mucinex. Discussed using saline rinse/mist prior to using Flonase to help with effectiveness. I discussed using saltwater gargles and Cepacol throat spray or lozenges to ease sore throat.  Tessalon Perles added for cough.  She will continue with Advair as prescribed and albuterol inhaler as needed.  Will take Tylenol  as needed for any pain and/or fever. Advised on signs/symptoms of emergent conditions. Patient advised to let us know if symptoms persist or if she develops any new or worsening symptoms.           Follow up if  symptoms worsen or fail to improve.   ______________________________________________________________________  Subjective:    Chief Complaint:  Cough and sinus congestion    HPI:  Breanna is a 74 y.o. year old female here concerned regarding cough, scratchy sore throat and  sinus congestion  with some wheezing and mild sob - started last week. She reports taking a couple left over steroids and a single left over amoxicillin.  She reports cough is productive with whitish sputum. She denies fever.  She is also using her Advair and albuterol rescue as needed.  Patient also reports taking azelastine.  She denies fever and chest pain.     Patient was also requesting an order to have an ultrasound of her thyroid- to recheck thyroid nodules.  She reports that she has not had an ultrasound in 2 years.     Medications:  Current Outpatient Medications on File Prior to Visit   Medication Sig Dispense Refill    albuterol (VENTOLIN HFA) 90 mcg/actuation inhaler Inhale 2 puffs into the lungs every 6 (six) hours as needed for Wheezing or Shortness of Breath. Rescue 18 g 3    azelastine (ASTELIN) 137 mcg (0.1 %) nasal spray 1 spray 2x a day as needed for nasal congestion; generic please 30 mL 2    biotin 1 mg tablet Take 1,000 mcg by mouth once daily.      blood sugar diagnostic (TRUE METRIX GLUCOSE TEST STRIP) Strp Use to test blood glucose one (1) to two (2) times daily, to be used with insurance-preferred brand of glucometer/supplies. 200 each 3    blood sugar diagnostic Strp Use to test blood glucose 1-2 times daily 100 strip 1    blood-glucose meter kit Use as instructed 1 each 0    CALCIUM CARBONATE/VITAMIN D3 (CALCIUM 600 + D,3, ORAL) Take 600 mg by mouth 2 (two) times daily.      coenzyme Q10 200 mg capsule Take 200 mg by mouth once daily.      fluticasone-salmeterol 230-21 mcg/dose (ADVAIR HFA) 230-21 mcg/actuation HFAA inhaler Inhale 2 puffs into the lungs 2 (two) times daily. Controller 12 g 2    lancets 32 gauge Misc Use  to test blood glucose 1-2 times daily 100 each 1    LUTEIN ORAL Take by mouth once daily at 6am.      magnesium oxide (MAG-OX) 400 mg (241.3 mg magnesium) tablet Take 1 tablet (400 mg total) by mouth once daily. 90 tablet 1    metFORMIN (GLUCOPHAGE-XR) 500 MG ER 24hr tablet Take 1 tablet (500 mg total) by mouth 2 (two) times daily with meals. 180 tablet 1    multivitamin (THERAGRAN) per tablet Take 1 tablet by mouth once daily.      torsemide (DEMADEX) 20 MG Tab Take 1 tablet (20 mg total) by mouth once daily. 20 tablet 0    TRUE METRIX AIR GLUCOSE METER [facility] USE AS DIRECTED 1 each 0    UNABLE TO FIND AG Pro - 2 pills twice a day      VITAMIN C 1000 MG tablet Take 1,000 mg by mouth once daily.      acetylcarnitine 500 mg Cap 250 mg by mouth twice a day (Patient not taking: Reported on 10/8/2024)      alpha lipoic acid 200 mg Cap Take 1 capsule by mouth 2 (two) times daily. (Patient not taking: Reported on 10/8/2024)      ALPRAZolam (XANAX) 0.5 MG tablet Take 0.5 mg by mouth.      betamethasone valerate 0.1% (VALISONE) 0.1 % Crea APPLY TOPICALLY 2 TIMES DAILY (Patient not taking: Reported on 10/8/2024) 45 g 0    hydrocortisone 2.5 % cream APPLY TO AFFECTED AREA 2 TIMES A DAY AS NEEDED FOR UP TO 2 WEEKS (Patient not taking: Reported on 10/8/2024)      ketoconazole (NIZORAL) 2 % cream APPLY TO AFFECTED AREA TWICE DAILY AS NEEDED (Patient not taking: Reported on 10/8/2024)      nitrofurantoin, macrocrystal-monohydrate, (MACROBID) 100 MG capsule Take 1 capsule (100 mg total) by mouth 2 (two) times daily. (Patient not taking: Reported on 10/8/2024) 10 capsule 0    rosuvastatin (CRESTOR) 10 MG tablet Take 1 tablet (10 mg total) by mouth every evening. 90 tablet 3    [DISCONTINUED] alendronate (FOSAMAX) 35 MG tablet Take 1 tablet by mouth once a week 15 tablet 3    [DISCONTINUED] amoxicillin-clavulanate 875-125mg (AUGMENTIN) 875-125 mg per tablet Take 1 tablet by mouth 2 (two) times daily. 14 tablet 0    [DISCONTINUED]  desonide (DESOWEN) 0.05 % cream       [DISCONTINUED] loratadine (CLARITIN) 10 mg tablet Take 1 tablet (10 mg total) by mouth daily as needed for Allergies (congestion or post nasal drip). Generics please 90 tablet 1    [DISCONTINUED] meloxicam (MOBIC) 15 MG tablet Take 1 tablet (15 mg total) by mouth once daily. 30 tablet 4    [DISCONTINUED] terbinafine HCL (LAMISIL) 1 % cream Apply topically 2 (two) times daily. 30 g 1     No current facility-administered medications on file prior to visit.       Review of Systems:  Review of Systems   Constitutional:  Positive for fatigue. Negative for fever.   HENT:  Positive for congestion, postnasal drip, rhinorrhea and sore throat.    Respiratory:  Positive for cough, chest tightness, shortness of breath and wheezing.    Cardiovascular:  Negative for chest pain, palpitations and leg swelling.       Past Medical History:  Past Medical History:   Diagnosis Date    Age-related osteoporosis without current pathological fracture     Asthma     Diabetes mellitus, type 2     w wt loss off metformin now; diet controlled.    Hx of colonic polyp 08/08/2017    Colonoscopy Giovanni Butler MD    Hypothyroidism     Mild intermittent asthma without complication     on Xolair inj monthly; allergist Dr Nelson.    Osteopenia     Osteopenia     Pre-hypertension     suspected white coat syndrome; also elevated w seeing ortho x2 for pain.    Proteinuria     Pure hypercholesterolemia     Thyroid nodule     Vitamin D insufficiency        Objective:    Vitals:  Vitals:    10/08/24 1332   BP: 132/80   Pulse: 86   SpO2: 96%   Weight: 70.5 kg (155 lb 5 oz)   Height: 5' (1.524 m)   PainSc: 0-No pain       Physical Exam  Vitals reviewed.   HENT:      Head: Normocephalic and atraumatic.      Right Ear: Tympanic membrane, ear canal and external ear normal.      Left Ear: Tympanic membrane, ear canal and external ear normal.      Nose: Mucosal edema, congestion and rhinorrhea present. Rhinorrhea is  purulent.      Right Turbinates: Swollen. Not pale.      Left Turbinates: Swollen. Not pale.      Right Sinus: No maxillary sinus tenderness or frontal sinus tenderness.      Left Sinus: No maxillary sinus tenderness or frontal sinus tenderness.      Mouth/Throat:      Mouth: Mucous membranes are moist.      Pharynx: Uvula midline. Posterior oropharyngeal erythema and postnasal drip present. No pharyngeal swelling.   Eyes:      Conjunctiva/sclera: Conjunctivae normal.   Neck:      Thyroid: No thyromegaly.   Cardiovascular:      Rate and Rhythm: Normal rate and regular rhythm.      Heart sounds: Normal heart sounds.   Pulmonary:      Effort: Pulmonary effort is normal. No respiratory distress.      Breath sounds: Wheezing present. No rhonchi.   Musculoskeletal:         General: Normal range of motion.      Cervical back: Normal range of motion and neck supple.   Lymphadenopathy:      Cervical: No cervical adenopathy.   Skin:     General: Skin is warm and dry.   Neurological:      General: No focal deficit present.      Mental Status: She is alert and oriented to person, place, and time.      Cranial Nerves: No cranial nerve deficit.   Psychiatric:         Mood and Affect: Mood normal.         Behavior: Behavior normal.         Thought Content: Thought content normal.         Data:        Medical history reviewed, Medications reconciled.          SOFYA HowellP-C  Family Medicine

## 2024-10-11 DIAGNOSIS — E11.9 TYPE 2 DIABETES MELLITUS WITHOUT COMPLICATION, WITHOUT LONG-TERM CURRENT USE OF INSULIN: ICD-10-CM

## 2024-10-11 NOTE — TELEPHONE ENCOUNTER
Care Due:                  Date            Visit Type   Department     Provider  --------------------------------------------------------------------------------                                EP -                              PRIMARY      [facility] FAMILY  Last Visit: 07-      CARE ([facility])   MEDICINE       Angie Robles  Next Visit: None Scheduled  None         None Found                                                            Last  Test          Frequency    Reason                     Performed    Due Date  --------------------------------------------------------------------------------    HBA1C.......  6 months...  metFORMIN................  07- 12-    [facility] Embedded Care Due Messages. Reference number: [id number].   10/11/2024 3:46:36 PM CDT

## 2024-10-12 RX ORDER — METFORMIN HYDROCHLORIDE 500 MG/1
500 TABLET, EXTENDED RELEASE ORAL 2 TIMES DAILY WITH MEALS
Qty: 180 TABLET | Refills: 0 | Status: SHIPPED | OUTPATIENT
Start: 2024-10-12

## 2024-10-12 NOTE — TELEPHONE ENCOUNTER
Provider Staff:  Action required for this patient    Requires labs      Please see care gap opportunities below in Care Due Message.    Thanks!  [facility]     Appointments      Date Provider   Last Visit   7/11/2024 Angie Robles MD   Next Visit   Visit date not found Angie Robles MD     Refill Decision Note   Breanna Silva  is requesting a refill authorization.  Brief Assessment and Rationale for Refill:  Approve     Medication Therapy Plan:         Comments:     Note composed:10:43 AM 10/12/2024

## 2024-10-15 ENCOUNTER — HOSPITAL ENCOUNTER (OUTPATIENT)
Dept: RADIOLOGY | Facility: HOSPITAL | Age: 74
Discharge: HOME | End: 2024-10-15
Attending: NURSE PRACTITIONER
Payer: MEDICARE

## 2024-10-15 DIAGNOSIS — E04.2 MULTIPLE THYROID NODULES: ICD-10-CM

## 2024-10-15 PROCEDURE — 76536 US EXAM OF HEAD AND NECK: CPT | Mod: TC,HCNC,PO

## 2024-10-15 PROCEDURE — 76536 US EXAM OF HEAD AND NECK: CPT | Mod: 26,HCNC,, | Performed by: RADIOLOGY

## 2024-10-16 ENCOUNTER — TELEPHONE (OUTPATIENT)
Dept: FAMILY MEDICINE | Facility: CLINIC | Age: 74
End: 2024-10-16
Payer: MEDICARE

## 2024-10-16 NOTE — TELEPHONE ENCOUNTER
----- Message from LIZABETH Howell sent at 10/15/2024  6:31 PM CDT -----  Ultrasound reviewed- thyroid nodules appear stable without significant change in size

## 2025-01-12 DIAGNOSIS — E11.9 TYPE 2 DIABETES MELLITUS WITHOUT COMPLICATION, WITHOUT LONG-TERM CURRENT USE OF INSULIN: ICD-10-CM

## 2025-01-12 NOTE — TELEPHONE ENCOUNTER
Care Due:                  Date            Visit Type   Department     Provider  --------------------------------------------------------------------------------                                EP -                              PRIMARY      [facility] FAMILY  Last Visit: 07-      CARE ([facility])   MEDICINE       Angie Robles  Next Visit: None Scheduled  None         None Found                                                            Last  Test          Frequency    Reason                     Performed    Due Date  --------------------------------------------------------------------------------    HBA1C.......  6 months...  metFORMIN................  07- 12-    [facility] Embedded Care Due Messages. Reference number: [id number].   1/12/2025 12:34:47 PM CST

## 2025-01-13 RX ORDER — METFORMIN HYDROCHLORIDE 500 MG/1
500 TABLET, EXTENDED RELEASE ORAL 2 TIMES DAILY WITH MEALS
Qty: 180 TABLET | Refills: 0 | Status: SHIPPED | OUTPATIENT
Start: 2025-01-13

## 2025-01-13 NOTE — TELEPHONE ENCOUNTER
Refill Routing Note   Medication(s) are not appropriate for processing by [facility] for the following reason(s):        Required labs outdated    [facility] action(s):  Defer     Requires labs : Yes             Appointments  past 12m or future 3m with PCP    Date Provider   Last Visit   7/11/2024 Angie Robles MD   Next Visit   Visit date not found Angie Robles MD   ED visits in past 90 days: 0        Note composed:9:54 PM 01/12/2025

## 2025-02-11 ENCOUNTER — LAB VISIT (OUTPATIENT)
Dept: LAB | Facility: HOSPITAL | Age: 75
End: 2025-02-11
Attending: FAMILY MEDICINE
Payer: MEDICARE

## 2025-02-11 DIAGNOSIS — E11.9 TYPE 2 DIABETES MELLITUS WITHOUT COMPLICATION, WITHOUT LONG-TERM CURRENT USE OF INSULIN: ICD-10-CM

## 2025-02-11 DIAGNOSIS — E03.9 HYPOTHYROIDISM, UNSPECIFIED TYPE: ICD-10-CM

## 2025-02-11 LAB
ALBUMIN SERPL BCP-MCNC: 3.5 G/DL (ref 3.5–5.2)
ALP SERPL-CCNC: 58 U/L (ref 40–150)
ALT SERPL W/O P-5'-P-CCNC: 20 U/L (ref 10–44)
ANION GAP SERPL CALC-SCNC: 9 MMOL/L (ref 8–16)
AST SERPL-CCNC: 26 U/L (ref 10–40)
BILIRUB SERPL-MCNC: 0.4 MG/DL (ref 0.1–1)
BUN SERPL-MCNC: 12 MG/DL (ref 8–23)
CALCIUM SERPL-MCNC: 9.2 MG/DL (ref 8.7–10.5)
CHLORIDE SERPL-SCNC: 108 MMOL/L (ref 95–110)
CHOLEST SERPL-MCNC: 238 MG/DL (ref 120–199)
CHOLEST/HDLC SERPL: 3.3 {RATIO} (ref 2–5)
CO2 SERPL-SCNC: 24 MMOL/L (ref 23–29)
CREAT SERPL-MCNC: 0.7 MG/DL (ref 0.5–1.4)
EST. GFR  (NO RACE VARIABLE): >60 ML/MIN/1.73 M^2
ESTIMATED AVG GLUCOSE: 123 MG/DL (ref 68–131)
GLUCOSE SERPL-MCNC: 117 MG/DL (ref 70–110)
HBA1C MFR BLD: 5.9 % (ref 4–5.6)
HDLC SERPL-MCNC: 72 MG/DL (ref 40–75)
HDLC SERPL: 30.3 % (ref 20–50)
LDLC SERPL CALC-MCNC: 146.2 MG/DL (ref 63–159)
NONHDLC SERPL-MCNC: 166 MG/DL
POTASSIUM SERPL-SCNC: 4.8 MMOL/L (ref 3.5–5.1)
PROT SERPL-MCNC: 6.7 G/DL (ref 6–8.4)
SODIUM SERPL-SCNC: 141 MMOL/L (ref 136–145)
TRIGL SERPL-MCNC: 99 MG/DL (ref 30–150)
TSH SERPL DL<=0.005 MIU/L-ACNC: 3.82 UIU/ML (ref 0.4–4)

## 2025-02-11 PROCEDURE — 84443 ASSAY THYROID STIM HORMONE: CPT | Performed by: FAMILY MEDICINE

## 2025-02-11 PROCEDURE — 80053 COMPREHEN METABOLIC PANEL: CPT | Performed by: FAMILY MEDICINE

## 2025-02-11 PROCEDURE — 83036 HEMOGLOBIN GLYCOSYLATED A1C: CPT | Performed by: FAMILY MEDICINE

## 2025-02-11 PROCEDURE — 80061 LIPID PANEL: CPT | Performed by: FAMILY MEDICINE

## 2025-02-20 ENCOUNTER — OFFICE VISIT (OUTPATIENT)
Dept: FAMILY MEDICINE | Facility: CLINIC | Age: 75
End: 2025-02-20
Payer: MEDICARE

## 2025-02-20 VITALS
BODY MASS INDEX: 30.63 KG/M2 | HEART RATE: 80 BPM | SYSTOLIC BLOOD PRESSURE: 128 MMHG | HEIGHT: 60 IN | DIASTOLIC BLOOD PRESSURE: 62 MMHG | OXYGEN SATURATION: 98 % | WEIGHT: 156 LBS

## 2025-02-20 DIAGNOSIS — F41.9 ANXIETY: ICD-10-CM

## 2025-02-20 DIAGNOSIS — E11.3293 TYPE 2 DIABETES MELLITUS WITH BOTH EYES AFFECTED BY MILD NONPROLIFERATIVE RETINOPATHY WITHOUT MACULAR EDEMA, WITHOUT LONG-TERM CURRENT USE OF INSULIN: Primary | ICD-10-CM

## 2025-02-20 DIAGNOSIS — R10.9 RIGHT FLANK PAIN: ICD-10-CM

## 2025-02-20 DIAGNOSIS — E66.01 MORBID (SEVERE) OBESITY DUE TO EXCESS CALORIES: ICD-10-CM

## 2025-02-20 DIAGNOSIS — E11.9 TYPE 2 DIABETES MELLITUS WITHOUT COMPLICATION, WITHOUT LONG-TERM CURRENT USE OF INSULIN: ICD-10-CM

## 2025-02-20 DIAGNOSIS — E03.9 HYPOTHYROIDISM, UNSPECIFIED TYPE: ICD-10-CM

## 2025-02-20 PROBLEM — E83.42 HYPOMAGNESEMIA: Status: RESOLVED | Noted: 2022-02-13 | Resolved: 2025-02-20

## 2025-02-20 PROCEDURE — 1101F PT FALLS ASSESS-DOCD LE1/YR: CPT | Mod: CPTII,S$GLB,, | Performed by: FAMILY MEDICINE

## 2025-02-20 PROCEDURE — 3074F SYST BP LT 130 MM HG: CPT | Mod: CPTII,S$GLB,, | Performed by: FAMILY MEDICINE

## 2025-02-20 PROCEDURE — 1160F RVW MEDS BY RX/DR IN RCRD: CPT | Mod: CPTII,S$GLB,, | Performed by: FAMILY MEDICINE

## 2025-02-20 PROCEDURE — 1159F MED LIST DOCD IN RCRD: CPT | Mod: CPTII,S$GLB,, | Performed by: FAMILY MEDICINE

## 2025-02-20 PROCEDURE — 99214 OFFICE O/P EST MOD 30 MIN: CPT | Mod: S$GLB,,, | Performed by: FAMILY MEDICINE

## 2025-02-20 PROCEDURE — 3288F FALL RISK ASSESSMENT DOCD: CPT | Mod: CPTII,S$GLB,, | Performed by: FAMILY MEDICINE

## 2025-02-20 PROCEDURE — G2211 COMPLEX E/M VISIT ADD ON: HCPCS | Mod: S$GLB,,, | Performed by: FAMILY MEDICINE

## 2025-02-20 PROCEDURE — 3078F DIAST BP <80 MM HG: CPT | Mod: CPTII,S$GLB,, | Performed by: FAMILY MEDICINE

## 2025-02-20 PROCEDURE — 99999 PR PBB SHADOW E&M-EST. PATIENT-LVL V: CPT | Mod: PBBFAC,,, | Performed by: FAMILY MEDICINE

## 2025-02-20 PROCEDURE — 1125F AMNT PAIN NOTED PAIN PRSNT: CPT | Mod: CPTII,S$GLB,, | Performed by: FAMILY MEDICINE

## 2025-02-20 RX ORDER — ALPRAZOLAM 0.5 MG/1
0.5 TABLET ORAL DAILY PRN
Qty: 20 TABLET | Refills: 0 | Status: SHIPPED | OUTPATIENT
Start: 2025-02-20

## 2025-02-20 NOTE — PROGRESS NOTES
Chief Complaint:  Chief Complaint   Patient presents with    Follow-up     6 mo follow up, neck, back, and right side    Results        HPI:  Breanna is a 74 y.o. year old     History of Present Illness    CHIEF COMPLAINT:  Breanna presents today for follow up of multiple medical concerns.    MUSCULOSKELETAL:  She reports right-sided pain that is exacerbated when lying on her right side and coughing. Pain increases when turning over in bed, requiring slow, careful movements. The pain is not constant but is triggered by certain movements, particularly when leaning on the affected side or coughing while lying on it. She also reports bilateral foot swelling. She was previously recommended compression stockings but experiences discomfort and bruising with knee-high compression stockings due to localized tightness.    RESPIRATORY:  She experiences frequent throat clearing with burning phlegm sensation and intermittent rattly voice changes throughout the day. She reports wheezing and immediate phlegm production when exposed to perfumes, which is a significant allergen trigger.    SURGICAL HISTORY:  History of C5-C6 fusion in 2019.    MEDICATIONS:  She discontinued Crestor due to preference to minimize medication use. She uses Xanax occasionally for stress management, typically every 3-4 weeks, sometimes taking half a dose at night for racing thoughts. She prefers inhaler devices over pill formulations due to throat irritation. Advair cost has increased after switching to [employer] insurance.    SOCIAL HISTORY:  Her  was diagnosed with mesothelioma in September.      ROS:  Respiratory: no cough, +wheezing  Musculoskeletal: +joint pain           Review of Systems   Constitutional:  Negative for fatigue and fever.   HENT:  Positive for postnasal drip. Negative for congestion.    Cardiovascular:  Positive for leg swelling (some swelling to lower extr after prolonged standing). Negative for chest pain and  palpitations.   Gastrointestinal:  Negative for abdominal pain, constipation and diarrhea.   Genitourinary:  Positive for dysuria (had 1 episode of burning). Negative for difficulty urinating and frequency (some).   Musculoskeletal:  Positive for back pain (mid-R back) and myalgias.   Skin:  Negative for pallor and rash.   Neurological:  Negative for dizziness, light-headedness and headaches.         Past Medical History:  Past Medical History:   Diagnosis Date    Age-related osteoporosis without current pathological fracture     Asthma     Diabetes mellitus, type 2     w wt loss off metformin now; diet controlled.    Hx of colonic polyp 08/08/2017    Colonoscopy Giovanni Butler MD    Hypothyroidism     Mild intermittent asthma without complication     on Xolair inj monthly; allergist Dr Nelson.    Osteopenia     Osteopenia     Pre-hypertension     suspected white coat syndrome; also elevated w seeing ortho x2 for pain.    Proteinuria     Pure hypercholesterolemia     Thyroid nodule     Vitamin D insufficiency          Vitals:  Vitals:    02/20/25 0917   BP: 128/62   Pulse: 80   SpO2: 98%   Weight: 70.8 kg (155 lb 15.6 oz)   Height: 5' (1.524 m)   PainSc:   6       BP Readings from Last 5 Encounters:   02/20/25 128/62   11/26/24 128/78   10/08/24 132/80   07/11/24 118/74   01/11/24 130/66       The 10-year ASCVD risk score (Gurvinder ANDREWS, et al., 2019) is: 32.3%    Values used to calculate the score:      Age: 74 years      Sex: Female      Is Non- : No      Diabetic: Yes      Tobacco smoker: No      Systolic Blood Pressure: 128 mmHg      Is BP treated: No      HDL Cholesterol: 72 mg/dL      Total Cholesterol: 238 mg/dL      Physical Exam:  Physical Exam  Vitals and nursing note reviewed.   Constitutional:       General: She is not in acute distress.     Appearance: Normal appearance. She is well-developed.   HENT:      Head: Normocephalic and atraumatic.      Right Ear: Tympanic membrane and  external ear normal.      Left Ear: Tympanic membrane and external ear normal.      Nose: Nose normal.      Mouth/Throat:      Pharynx: No oropharyngeal exudate.   Eyes:      General: No scleral icterus.        Right eye: No discharge.         Left eye: No discharge.      Conjunctiva/sclera: Conjunctivae normal.      Pupils: Pupils are equal, round, and reactive to light.   Neck:      Thyroid: No thyromegaly.   Cardiovascular:      Rate and Rhythm: Normal rate and regular rhythm.   Pulmonary:      Effort: Pulmonary effort is normal. No respiratory distress.      Breath sounds: Wheezing present.   Abdominal:      General: There is no distension.      Palpations: Abdomen is soft.   Musculoskeletal:         General: Tenderness (mild ttp to the R lower rib line with palpation) present. No deformity or signs of injury.      Cervical back: Neck supple.      Right lower leg: No edema.      Left lower leg: No edema.   Lymphadenopathy:      Cervical: No cervical adenopathy.   Skin:     General: Skin is warm and dry.      Findings: No rash.   Neurological:      General: No focal deficit present.      Mental Status: She is alert and oriented to person, place, and time.      Cranial Nerves: No cranial nerve deficit.   Psychiatric:         Mood and Affect: Mood normal.         Behavior: Behavior normal.             Assessment & Plan:  Type 2 diabetes mellitus with both eyes affected by mild nonproliferative retinopathy without macular edema, without long-term current use of insulin  Comments:  A1c well-controlled, cont regimen/monitoring    Morbid (severe) obesity due to excess calories    Hypothyroidism, unspecified type  -     TSH; Future; Expected date: 02/20/2025    Type 2 diabetes mellitus without complication, without long-term current use of insulin  Comments:  controlled, cont regimen  Orders:  -     Lipid Panel; Future; Expected date: 02/20/2025  -     Hemoglobin A1C; Future; Expected date: 02/20/2025  -      Comprehensive Metabolic Panel; Future; Expected date: 02/20/2025    Right flank pain  -     US Abdomen Complete; Future; Expected date: 02/20/2025    Anxiety  -     ALPRAZolam (XANAX) 0.5 MG tablet; Take 1 tablet (0.5 mg total) by mouth daily as needed for Anxiety.  Dispense: 20 tablet; Refill: 0         Assessment & Plan    ORDERS:   Lab work ordered to be completed in 6 months   Ultrasound of abdomen ordered to evaluate kidney, liver, and surrounding area    IMPRESSION:  Patient's blood pressure and lab results generally good, with improved blood sugar and normal kidney function  Cholesterol elevated due to discontinuation of Crestor; discussed risks/benefits of restarting statin therapy given patient's cardiovascular risk profile  Mid-back pain likely musculoskeletal based on physical exam and normal lab values; kidney involvement less likely  Noted wheezing on right side of chest, possibly contributing to back discomfort  Considering acid reflux as potential cause of chronic throat irritation  Awaiting CT results from Dr. Sanchez to further evaluate chest/back symptoms    PLAN SUMMARY:   Schedule CT before May appointment with Dr. Sanchez   Ultrasound of abdomen ordered to evaluate kidney, liver, and surrounding area   Follow Mediterranean diet for cholesterol management   Lab work ordered for 6-month follow-up   Follow up in 5-6 months   Xanax refilled for occasional use (sent to [facility] in [place])   Contact office regarding alternative inhaler options covered by new insurance plan   Start OTC Nexium or Prilosec for potential acid reflux symptoms   Elevate feet to reduce swelling   Try compression stockings for leg swelling   Increase use of albuterol inhaler for current wheezing symptoms   Recommend physical therapy and heat application for back pain management    PATIENT EDUCATION:   Explained vascular dementia risk associated with elevated cholesterol levels   Discussed Mediterranean diet as  recommended approach for managing cholesterol through nutrition   Clarified that kidney pain would typically present with abnormal lab values, which were not seen in recent tests    ACTION ITEMS/LIFESTYLE:   Breanna to try compression stockings for leg swelling, potentially modifying fit if uncomfortable   Breanna to elevate feet when possible to reduce swelling   Recommend physical therapy and heat application for back pain management   Breanna to follow Mediterranean diet for cholesterol management   Breanna to contact office regarding alternative inhaler options covered by new insurance plan ([employer])   Breanna to schedule CT before May appointment with Dr. Sanchez    MEDICATIONS:   Increased use of albuterol inhaler for current wheezing symptoms   Start OTC Nexium or Prilosec for potential acid reflux symptoms   Refilled Xanax for occasional use (sent to [facility] in [place])    FOLLOW UP:   Follow up in 5-6 months         Visit today included increased complexity associated with the care of the episodic problem R flank pain addressed and managing the longitudinal care of the patient due to the serious and/or complex managed problem(s) dm2, obesity, thyroid.    This note was generated with the assistance of ambient listening technology. Verbal consent was obtained by the patient and accompanying visitor(s) for the recording of patient appointment to facilitate this note. I attest to having reviewed and edited the generated note for accuracy, though some syntax or spelling errors may persist. Please contact the author of this note for any clarification.

## 2025-03-18 ENCOUNTER — HOSPITAL ENCOUNTER (OUTPATIENT)
Dept: RADIOLOGY | Facility: HOSPITAL | Age: 75
Discharge: HOME | End: 2025-03-18
Attending: INTERNAL MEDICINE
Payer: MEDICARE

## 2025-03-18 ENCOUNTER — HOSPITAL ENCOUNTER (OUTPATIENT)
Dept: RADIOLOGY | Facility: HOSPITAL | Age: 75
Discharge: HOME | End: 2025-03-18
Attending: FAMILY MEDICINE
Payer: MEDICARE

## 2025-03-18 DIAGNOSIS — J45.30 MILD PERSISTENT ASTHMA WITHOUT COMPLICATION: ICD-10-CM

## 2025-03-18 DIAGNOSIS — R10.9 RIGHT FLANK PAIN: ICD-10-CM

## 2025-03-18 PROCEDURE — 76700 US EXAM ABDOM COMPLETE: CPT | Mod: TC,PO

## 2025-03-18 PROCEDURE — 71250 CT THORAX DX C-: CPT | Mod: 26,,, | Performed by: RADIOLOGY

## 2025-03-18 PROCEDURE — 71250 CT THORAX DX C-: CPT | Mod: TC,PO

## 2025-03-18 PROCEDURE — 76700 US EXAM ABDOM COMPLETE: CPT | Mod: 26,,, | Performed by: RADIOLOGY

## 2025-04-05 ENCOUNTER — RESULTS FOLLOW-UP (OUTPATIENT)
Dept: FAMILY MEDICINE | Facility: CLINIC | Age: 75
End: 2025-04-05

## 2025-04-05 DIAGNOSIS — E03.9 HYPOTHYROIDISM, UNSPECIFIED TYPE: Primary | ICD-10-CM

## 2025-04-08 DIAGNOSIS — E11.9 TYPE 2 DIABETES MELLITUS WITHOUT COMPLICATION, WITHOUT LONG-TERM CURRENT USE OF INSULIN: ICD-10-CM

## 2025-04-08 RX ORDER — METFORMIN HYDROCHLORIDE 500 MG/1
500 TABLET, EXTENDED RELEASE ORAL 2 TIMES DAILY WITH MEALS
Qty: 180 TABLET | Refills: 1 | Status: SHIPPED | OUTPATIENT
Start: 2025-04-08

## 2025-04-08 NOTE — TELEPHONE ENCOUNTER
Refill Decision Note   Breanna Silva  is requesting a refill authorization.  Brief Assessment and Rationale for Refill:  Approve     Medication Therapy Plan:         Comments:     Note composed:12:56 PM 04/08/2025

## 2025-04-08 NOTE — TELEPHONE ENCOUNTER
No care due was identified.  [facility] Embedded Care Due Messages. Reference number: [id number].   4/08/2025 9:44:33 AM CDT

## 2025-04-28 DIAGNOSIS — Z00.00 ENCOUNTER FOR MEDICARE ANNUAL WELLNESS EXAM: ICD-10-CM

## 2025-06-19 ENCOUNTER — RESULTS FOLLOW-UP (OUTPATIENT)
Dept: FAMILY MEDICINE | Facility: CLINIC | Age: 75
End: 2025-06-19

## 2025-06-24 ENCOUNTER — TELEPHONE (OUTPATIENT)
Dept: FAMILY MEDICINE | Facility: CLINIC | Age: 75
End: 2025-06-24
Payer: MEDICARE

## 2025-06-24 NOTE — TELEPHONE ENCOUNTER
"Spoke w/ pharmacist "alycia" at [facility] pharmacy [address] [facility] #[phone], she states she is going to retry a different brand for blood sugar diagnostic test strips that pt's insurance will accept, she took verbal ok to try different brand. Alycia stated she will call us back if any problems.   "

## 2025-06-24 NOTE — TELEPHONE ENCOUNTER
Copied from CRM #[id number]. Topic: General Inquiry - Patient Advice  >> Jun 23, 2025  3:14 PM Brigette wrote:  Type: Needs Medical Advice  Who Called:  pt     Best Call Back Number: [phone]    Additional Information: pt requesting call back in regards to her testing strips please advise

## 2025-06-26 ENCOUNTER — OFFICE VISIT (OUTPATIENT)
Dept: FAMILY MEDICINE | Facility: CLINIC | Age: 75
End: 2025-06-26
Payer: MEDICARE

## 2025-06-26 VITALS
HEART RATE: 71 BPM | WEIGHT: 156.06 LBS | DIASTOLIC BLOOD PRESSURE: 60 MMHG | OXYGEN SATURATION: 97 % | SYSTOLIC BLOOD PRESSURE: 122 MMHG | BODY MASS INDEX: 30.64 KG/M2 | HEIGHT: 60 IN

## 2025-06-26 DIAGNOSIS — E03.9 HYPOTHYROIDISM, UNSPECIFIED TYPE: ICD-10-CM

## 2025-06-26 DIAGNOSIS — B96.89 ACUTE BACTERIAL BRONCHITIS: Primary | ICD-10-CM

## 2025-06-26 DIAGNOSIS — J04.0 LARYNGITIS: ICD-10-CM

## 2025-06-26 DIAGNOSIS — J45.20 MILD INTERMITTENT ASTHMA WITHOUT COMPLICATION: ICD-10-CM

## 2025-06-26 DIAGNOSIS — E11.9 TYPE 2 DIABETES MELLITUS WITHOUT COMPLICATION, WITHOUT LONG-TERM CURRENT USE OF INSULIN: ICD-10-CM

## 2025-06-26 DIAGNOSIS — E78.2 MIXED HYPERLIPIDEMIA: ICD-10-CM

## 2025-06-26 DIAGNOSIS — J20.8 ACUTE BACTERIAL BRONCHITIS: Primary | ICD-10-CM

## 2025-06-26 LAB
LEFT EYE DM RETINOPATHY: POSITIVE
RIGHT EYE DM RETINOPATHY: POSITIVE

## 2025-06-26 PROCEDURE — 3074F SYST BP LT 130 MM HG: CPT | Mod: CPTII,S$GLB,, | Performed by: FAMILY MEDICINE

## 2025-06-26 PROCEDURE — 1159F MED LIST DOCD IN RCRD: CPT | Mod: CPTII,S$GLB,, | Performed by: FAMILY MEDICINE

## 2025-06-26 PROCEDURE — 3078F DIAST BP <80 MM HG: CPT | Mod: CPTII,S$GLB,, | Performed by: FAMILY MEDICINE

## 2025-06-26 PROCEDURE — 1101F PT FALLS ASSESS-DOCD LE1/YR: CPT | Mod: CPTII,S$GLB,, | Performed by: FAMILY MEDICINE

## 2025-06-26 PROCEDURE — G2211 COMPLEX E/M VISIT ADD ON: HCPCS | Mod: S$GLB,,, | Performed by: FAMILY MEDICINE

## 2025-06-26 PROCEDURE — 3288F FALL RISK ASSESSMENT DOCD: CPT | Mod: CPTII,S$GLB,, | Performed by: FAMILY MEDICINE

## 2025-06-26 PROCEDURE — 1160F RVW MEDS BY RX/DR IN RCRD: CPT | Mod: CPTII,S$GLB,, | Performed by: FAMILY MEDICINE

## 2025-06-26 PROCEDURE — 99214 OFFICE O/P EST MOD 30 MIN: CPT | Mod: S$GLB,,, | Performed by: FAMILY MEDICINE

## 2025-06-26 PROCEDURE — 99999 PR PBB SHADOW E&M-EST. PATIENT-LVL IV: CPT | Mod: PBBFAC,,, | Performed by: FAMILY MEDICINE

## 2025-06-26 PROCEDURE — 1126F AMNT PAIN NOTED NONE PRSNT: CPT | Mod: CPTII,S$GLB,, | Performed by: FAMILY MEDICINE

## 2025-06-26 RX ORDER — METHYLPREDNISOLONE 4 MG/1
TABLET ORAL
Qty: 21 EACH | Refills: 0 | Status: SHIPPED | OUTPATIENT
Start: 2025-06-26 | End: 2025-07-17

## 2025-06-26 RX ORDER — BUDESONIDE AND FORMOTEROL FUMARATE DIHYDRATE 160; 4.5 UG/1; UG/1
2 AEROSOL RESPIRATORY (INHALATION) EVERY 12 HOURS
Qty: 10.2 G | Refills: 2 | Status: SHIPPED | OUTPATIENT
Start: 2025-06-26 | End: 2026-06-26

## 2025-06-26 RX ORDER — DOXYCYCLINE HYCLATE 100 MG
100 TABLET ORAL 2 TIMES DAILY
Qty: 14 TABLET | Refills: 0 | Status: SHIPPED | OUTPATIENT
Start: 2025-06-26

## 2025-06-26 NOTE — PROGRESS NOTES
Chief Complaint:  Chief Complaint   Patient presents with    Follow-up     Test results started 1 week ago no voice started antibiotic Monday dox        HPI:  Breanna is a 74 y.o. year old     History of Present Illness    CHIEF COMPLAINT:  Breanna presents today for cough and hoarseness    HISTORY OF PRESENT ILLNESS:  She reports a persistent, rattly, asthma-type cough with dried congestion and phlegm. She experiences upper chest pain when coughing and describes the cough as non-stop, severely impacting her breathing. She has a history of pulling ribs from excessive coughing, which causes significant pain and limits her ability to take deep breaths. She has already initiated antibiotic treatment with doxycycline for these symptoms.    RESPIRATORY:  She has a history of chronic lung problems and asthma, including a month-long hospitalization related to her lung condition. She expresses significant anxiety about her lung health and keeps antibiotics on hand due to fear of severe respiratory complications. Her cautious approach stems from previous extensive hospitalization and underlying lung vulnerability. She has a prescription for Advair but reports difficulty using the diskus due to insurance coverage and personal preference, acknowledging she should use it regularly but does not.    MEDICATIONS:  She started doxycycline, taking three pills total. She currently uses an old prescription liquid cough medicine at nighttime for sleep. She is not currently using Advair despite having a prescription and expresses preference for Advair over other respiratory medications.    LABS / TEST RESULTS:  TSH level is optimal. Cholesterol level is slightly elevated at 139. Vitamin D level is normal. Hemoglobin A1C is 5.8, indicating prediabetic range but blood sugars are trending positively.    IMAGING:  Recent CT showed stable nodules with no concerning findings. Follow-up scheduled for October.    INTEGUMENTARY (SKIN):  She  presents with a new tender lump/cyst in her ear that is persistent and repeatedly forms scabs. She suspects it may be related to her computer earpieces and plans dermatology evaluation. She is currently treating the area with hydrogen peroxide.      ROS:  ENT: +hoarseness  Respiratory: +cough, +chest congestion  Musculoskeletal: +pain with respiration                 Past Medical History:  Past Medical History:   Diagnosis Date    Age-related osteoporosis without current pathological fracture     Asthma     Diabetes mellitus, type 2     w wt loss off metformin now; diet controlled.    Hx of colonic polyp 08/08/2017    Colonoscopy Giovanni Butler MD    Hypothyroidism     Mild intermittent asthma without complication     on Xolair inj monthly; allergist Dr Nelson.    Osteopenia     Osteopenia     Pre-hypertension     suspected white coat syndrome; also elevated w seeing ortho x2 for pain.    Proteinuria     Pure hypercholesterolemia     Thyroid nodule     Vitamin D insufficiency          Vitals:  Vitals:    06/26/25 0902   BP: 122/60   Pulse: 71   SpO2: 97%   Weight: 70.8 kg (156 lb 1.4 oz)   Height: 5' (1.524 m)   PainSc: 0-No pain       BP Readings from Last 5 Encounters:   06/26/25 122/60   02/20/25 128/62   11/26/24 128/78   10/08/24 132/80   07/11/24 118/74       The 10-year ASCVD risk score (Gurvinder DK, et al., 2019) is: 29.9%    Values used to calculate the score:      Age: 74 years      Sex: Female      Is Non- : No      Diabetic: Yes      Tobacco smoker: No      Systolic Blood Pressure: 122 mmHg      Is BP treated: No      HDL Cholesterol: 76 mg/dL      Total Cholesterol: 235 mg/dL      Physical Exam:  Physical Exam  Vitals and nursing note reviewed.   Constitutional:       General: She is not in acute distress.     Appearance: Normal appearance. She is well-developed.   HENT:      Head: Normocephalic and atraumatic.      Right Ear: Tympanic membrane normal.      Left Ear: Tympanic  membrane normal.   Eyes:      General: No scleral icterus.        Right eye: No discharge.         Left eye: No discharge.      Conjunctiva/sclera: Conjunctivae normal.   Cardiovascular:      Rate and Rhythm: Normal rate and regular rhythm.   Pulmonary:      Effort: Pulmonary effort is normal. No respiratory distress.      Breath sounds: Normal breath sounds.   Abdominal:      Palpations: Abdomen is soft.      Tenderness: There is no guarding.   Musculoskeletal:         General: No deformity or signs of injury.      Cervical back: Neck supple.   Lymphadenopathy:      Cervical: No cervical adenopathy.   Skin:     General: Skin is warm and dry.      Findings: No rash.   Neurological:      General: No focal deficit present.      Mental Status: She is alert and oriented to person, place, and time.   Psychiatric:         Mood and Affect: Mood normal.         Behavior: Behavior normal.             Assessment & Plan:  Acute bacterial bronchitis  -     doxycycline (VIBRA-TABS) 100 MG tablet; Take 1 tablet (100 mg total) by mouth 2 (two) times daily.  Dispense: 14 tablet; Refill: 0  -     methylPREDNISolone (MEDROL DOSEPACK) 4 mg tablet; use as directed  Dispense: 21 each; Refill: 0    Laryngitis    Mixed hyperlipidemia  -     CBC Auto Differential; Future; Expected date: 06/26/2025  -     Urinalysis, Reflex to Urine Culture Urine, Clean Catch    Hypothyroidism, unspecified type  -     TSH; Future; Expected date: 06/26/2025    Type 2 diabetes mellitus without complication, without long-term current use of insulin  Comments:  controlled, cont regimen  Orders:  -     Comprehensive Metabolic Panel; Future; Expected date: 06/26/2025  -     Hemoglobin A1C; Future; Expected date: 06/26/2025  -     Lipid Panel; Future; Expected date: 06/26/2025    Mild intermittent asthma without complication  -     budesonide-formoterol 160-4.5 mcg (SYMBICORT) 160-4.5 mcg/actuation HFAA; Inhale 2 puffs into the lungs every 12 (twelve) hours.  Controller  Dispense: 10.2 g; Refill: 2         Assessment & Plan    ORDERS:   Ordered thyroid, blood sugar, CBC, urinalysis for September/October.    IMPRESSION:  Respiratory symptoms include some tightness in upper chest but no significant wheezing.  Noted recent CT Chest results from Dr. Sanchez showing stable nodules.  Explained that hoarseness can be caused by irritation from coughing or viral infection.  Evaluated ear lump, recommending antibiotic ointment instead of hydrogen peroxide and dermatologist follow-up.  Considered reluctance to take cholesterol medication due to previous side effects.  Reviewed recent lab results, including thyroid (TSH) and cholesterol levels.    PLAN SUMMARY:   Continue doxycycline to complete 7-day course   Start Symbicort inhaler as alternative to Advair   Start prednisone (steroid pack)   Continue albuterol inhaler as needed for wheezing or tightness   Ordered thyroid, blood sugar, CBC, urinalysis for September/October   Follow up with CT Chest as scheduled in October   Follow up in September or October for labs    PATIENT EDUCATION:   Explained the difference between systemic steroids (like prednisone) and inhaled steroids (like Advair).   Discussed the importance of inhaled corticosteroids in boosting lung tissue resilience against infections.    ACTION ITEMS/LIFESTYLE:   Recommend increasing physical activity to help manage cholesterol levels.   Breanna should clean and sterilize ear pieces regularly to prevent bacterial buildup.    MEDICATIONS:   Continued doxycycline to complete a 7-day course.   Started prednisone (steroid pack).   Started Symbicort inhaler as an alternative to Advair due to insurance coverage issues and explained it has a new indication for use as needed as well as daily.   Continue albuterol inhaler as needed for wheezing or tightness.    FOLLOW UP:   Follow up with CT Chest as scheduled in October.   Follow up in September or October for labs.          Visit today included increased complexity associated with the care of the episodic problem bronchitis addressed and managing the longitudinal care of the patient due to the serious and/or complex managed problem(s) asthma, dm2, lipid.    This note was generated with the assistance of ambient listening technology. Verbal consent was obtained by the patient and accompanying visitor(s) for the recording of patient appointment to facilitate this note. I attest to having reviewed and edited the generated note for accuracy, though some syntax or spelling errors may persist. Please contact the author of this note for any clarification.

## 2025-07-22 NOTE — TELEPHONE ENCOUNTER
----- Message from Willie Chowdhury sent at 9/6/2024  3:30 PM CDT -----  Type: Needs Medical Advice  Who Called:  pt  Best Call Back Number: [phone]    Additional Information: Pt is calling the office needs a new machine for testing strips.Please call back an advise.   MRN [id number]

## 2025-07-24 ENCOUNTER — PATIENT OUTREACH (OUTPATIENT)
Dept: ADMINISTRATIVE | Facility: HOSPITAL | Age: 75
End: 2025-07-24
Payer: MEDICARE

## 2025-07-24 NOTE — PROGRESS NOTES
External Records received -Uploaded and Hyper Linked Diabetic Eye exam  in [facility]       [employer] for eye exam claim

## 2025-07-24 NOTE — LETTER
AUTHORIZATION FOR RELEASE OF   CONFIDENTIAL INFORMATION      We are seeing Breanna Silva, date of birth 1950, in the clinic at [facility]. Angie Robles MD is the patient's PCP. Breanna Silva has an outstanding lab/procedure at the time we reviewed her chart. In order to help keep her health information updated, she has authorized us to request the following medical record(s):                                              (  )  EYE EXAM       24     Please fax records to [facility], Elder, Lauren S., MD,  at [phone] or email to [email or web address].             Patient Name: Breanna Silva  : 1950  Patient Phone #: [phone]

## 2025-07-28 ENCOUNTER — PATIENT OUTREACH (OUTPATIENT)
Dept: ADMINISTRATIVE | Facility: HOSPITAL | Age: 75
End: 2025-07-28
Payer: MEDICARE

## 2025-07-28 DIAGNOSIS — E11.9 TYPE 2 DIABETES MELLITUS WITHOUT COMPLICATION, WITHOUT LONG-TERM CURRENT USE OF INSULIN: Primary | ICD-10-CM

## 2025-07-28 NOTE — PROGRESS NOTES
Population Health Chart Review & Patient Outreach Details      Additional [facility] Health Notes:               Updates Requested / Reviewed:      Updated Care Coordination Note and Immunizations Reconciliation Completed or Queried: [place]         Health Maintenance Topics Overdue:      [facility] Score: 1     Osteoporosis Screening    Shingles/Zoster Vaccine                  Health Maintenance Topic(s) Outreach Outcomes & Actions Taken:    Lab(s) - Outreach Outcomes & Actions Taken  : Overdue Lab(s) Ordered and Overdue Lab(s) Scheduled

## 2025-08-05 ENCOUNTER — TELEPHONE (OUTPATIENT)
Dept: FAMILY MEDICINE | Facility: CLINIC | Age: 75
End: 2025-08-05
Payer: MEDICARE

## 2025-08-05 DIAGNOSIS — N30.80 ACUTE BACTERIAL SIMPLE CYSTITIS: Primary | ICD-10-CM

## 2025-08-05 DIAGNOSIS — B96.89 ACUTE BACTERIAL SIMPLE CYSTITIS: Primary | ICD-10-CM

## 2025-08-05 NOTE — TELEPHONE ENCOUNTER
Spoke w/ pt she reports she has been having dysuria x 3 days, pain level 6. 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

## 2025-08-05 NOTE — TELEPHONE ENCOUNTER
Return call to patient, stating Dr. Robles will return her call shortly,. She is in the middle of clinic. Patient states that she should not have to wait until a DrTera Contacts her while she is in pain.

## 2025-08-05 NOTE — TELEPHONE ENCOUNTER
Copied from CRM #[id number]. Topic: Medications - Medication Status Check   >> Aug 4, 2025  4:51 PM Kelton wrote:    Who Called: Pt     Pharmacy name and phone #:    [id number] - [place], [place] - [address]  [address]   [place] [address]  Phone: [phone] Fax: [phone]    Would the patient rather a call back or a response via [facility]? Call   Best Call Back Number: [phone]    Additional Information: Pt states she is looking for rx refill. She contacted the pharm and they confirmed the order was not sent in. Pt would like a call back with RX update for bladder infection treatment. (Nitrofurantoin). Pt states she is in pain.

## 2025-08-05 NOTE — TELEPHONE ENCOUNTER
Copied from CRM #[id number]. Topic: General Inquiry - Patient Advice  >> Aug 5, 2025  2:19 PM Louann wrote:  Type: Needs Medical Advice  Who Called:  Pt      Best Call Back Number: [phone]    Additional Information: Pt states she is still in pain and hasn't heard back from anyone she needs to get a return call for a update on if dr leos is calling is calling

## 2025-08-07 RX ORDER — SULFAMETHOXAZOLE AND TRIMETHOPRIM 800; 160 MG/1; MG/1
1 TABLET ORAL 2 TIMES DAILY
Qty: 10 TABLET | Refills: 0 | Status: SHIPPED | OUTPATIENT
Start: 2025-08-07 | End: 2025-08-08

## 2025-08-07 NOTE — TELEPHONE ENCOUNTER
I sent in an antibiotic, however, for future issues we'd need at least an e-visit or a virtual visit to properly document her symptoms and make sure we're treating appropriately.

## 2025-08-08 ENCOUNTER — OFFICE VISIT (OUTPATIENT)
Dept: FAMILY MEDICINE | Facility: CLINIC | Age: 75
End: 2025-08-08
Payer: MEDICARE

## 2025-08-08 VITALS
WEIGHT: 154.56 LBS | HEART RATE: 79 BPM | OXYGEN SATURATION: 98 % | HEIGHT: 60 IN | TEMPERATURE: 99 F | BODY MASS INDEX: 30.35 KG/M2 | SYSTOLIC BLOOD PRESSURE: 130 MMHG | DIASTOLIC BLOOD PRESSURE: 62 MMHG

## 2025-08-08 DIAGNOSIS — R30.0 DYSURIA: ICD-10-CM

## 2025-08-08 DIAGNOSIS — N30.01 ACUTE CYSTITIS WITH HEMATURIA: Primary | ICD-10-CM

## 2025-08-08 LAB
BILIRUBIN, UA POC OHS: NEGATIVE
BLOOD, UA POC OHS: ABNORMAL
CLARITY, UA POC OHS: CLEAR
COLOR, UA POC OHS: YELLOW
GLUCOSE, UA POC OHS: NEGATIVE
KETONES, UA POC OHS: NEGATIVE
LEUKOCYTES, UA POC OHS: NEGATIVE
NITRITE, UA POC OHS: NEGATIVE
PH, UA POC OHS: 6.5
PROTEIN, UA POC OHS: NEGATIVE
SPECIFIC GRAVITY, UA POC OHS: 1.01
UROBILINOGEN, UA POC OHS: 0.2

## 2025-08-08 PROCEDURE — 99999 PR PBB SHADOW E&M-EST. PATIENT-LVL V: CPT | Mod: PBBFAC,,, | Performed by: NURSE PRACTITIONER

## 2025-08-08 RX ORDER — NITROFURANTOIN 25; 75 MG/1; MG/1
100 CAPSULE ORAL 2 TIMES DAILY
Qty: 10 CAPSULE | Refills: 0 | Status: SHIPPED | OUTPATIENT
Start: 2025-08-08 | End: 2025-08-13

## 2025-08-08 NOTE — PROGRESS NOTES
THIS DOCUMENT WAS MADE IN PART WITH VOICE RECOGNITION SOFTWARE.  OCCASIONALLY THIS SOFTWARE WILL MISINTERPRET WORDS OR PHRASES.     Assessment and Plan:    Acute cystitis with hematuria  -     nitrofurantoin, macrocrystal-monohydrate, (MACROBID) 100 MG capsule; Take 1 capsule (100 mg total) by mouth 2 (two) times daily. for 5 days  Dispense: 10 capsule; Refill: 0  -     Urine Culture High Risk ($$)    Dysuria  -     POCT Urinalysis(Instrument)             Visit summary:       Patient was seen today in clinic for UTI     POCT urinalysis  reviewed     Urine specimen sent for culture and sensitivity     Rx:  Macrobid    Advised on diagnosis, medications and symptomatic treatment.     Increase hydration,  may take Tylenol as needed for pain and/or fever.     Emergent conditions/ ER precautions discussed       Follow up if symptoms worsen or fail to improve.   ______________________________________________________________________  Subjective:    History of Present Illness    CHIEF COMPLAINT:  - Patient presents with symptoms of a UTI, including burning and urgency.    HPI:  Patient reports symptoms of a UTI that started approximately 5 days ago. She describes burning, urgency, and a throbbing sensation when urinating, along with a constant need to urinate but often being unable to do so when attempting. She initially could not seek immediate medical attention due to caregiving responsibilities for an elderly lady and her  with cancer who needed chemotherapy.    Unable to come to the clinic, she called for 3 consecutive days seeking help. Eventually, she obtained nitrofurantoin from her sister, of which she took a few pills. She reports some improvement in symptoms after taking this medication, but notes that the relief is not complete.    She was eventually prescribed an antibiotic, Bactrim. She has difficulty swallowing the prescribed pills due to their large size, stemming from a past choking incident with  aspirin. She attempted to take half a pill but expresses concern about whether this is appropriate.    She also mentions that she does not drink enough water, typically consuming only 1 or 2 16-ounce bottles per day.    She denies fever, abdominal pain and flank pain.         Medications:  Medications Ordered Prior to Encounter[1]    Review of Systems:  Review of Systems   All other systems reviewed and are negative.      Past Medical History:  Past Medical History:   Diagnosis Date    Age-related osteoporosis without current pathological fracture     AMD (age-related macular degeneration), bilateral 06/26/2025    Asthma     Diabetes mellitus, type 2     w wt loss off metformin now; diet controlled.    Hx of colonic polyp 08/08/2017    Colonoscopy Giovanni Butler MD    Hypothyroidism     Mild intermittent asthma without complication     on Xolair inj monthly; allergist Dr Nelson.    Nuclear sclerosis of both eyes 06/26/2025    Osteopenia     Osteopenia     Pre-hypertension     suspected white coat syndrome; also elevated w seeing ortho x2 for pain.    Proteinuria     Pure hypercholesterolemia     Thyroid nodule     Vitamin D insufficiency        Objective:    Vitals:  Vitals:    08/08/25 0834   BP: 130/62   Pulse: 79   Temp: 98.5 °F (36.9 °C)   TempSrc: Oral   SpO2: 98%   Weight: 70.1 kg (154 lb 8.7 oz)   Height: 5' (1.524 m)   PainSc:   4       Physical Exam  Vitals and nursing note reviewed.   Constitutional:       General: She is not in acute distress.  HENT:      Head: Normocephalic and atraumatic.      Mouth/Throat:      Mouth: Mucous membranes are moist.      Pharynx: Oropharynx is clear.   Eyes:      General: No scleral icterus.     Conjunctiva/sclera: Conjunctivae normal.   Cardiovascular:      Rate and Rhythm: Normal rate and regular rhythm.   Pulmonary:      Effort: Pulmonary effort is normal. No respiratory distress.      Breath sounds: Normal breath sounds.   Abdominal:      General: Bowel sounds are  normal. There is no distension.      Palpations: Abdomen is soft.      Tenderness: There is no abdominal tenderness. There is no right CVA tenderness or left CVA tenderness.   Musculoskeletal:      Right lower leg: No edema.      Left lower leg: No edema.   Skin:     General: Skin is warm and dry.   Neurological:      Mental Status: She is alert and oriented to person, place, and time.   Psychiatric:         Mood and Affect: Mood normal.         Behavior: Behavior normal.         Thought Content: Thought content normal.         Data:  CMP  Sodium   Date Value Ref Range Status   03/18/2025 143 136 - 145 mmol/L Final     Potassium   Date Value Ref Range Status   03/18/2025 4.6 3.5 - 5.1 mmol/L Final     Chloride   Date Value Ref Range Status   03/18/2025 106 95 - 110 mmol/L Final     CO2   Date Value Ref Range Status   03/18/2025 24 23 - 29 mmol/L Final     Glucose   Date Value Ref Range Status   03/18/2025 103 70 - 110 mg/dL Final     BUN   Date Value Ref Range Status   03/18/2025 12 8 - 23 mg/dL Final     Creatinine   Date Value Ref Range Status   03/18/2025 0.8 0.5 - 1.4 mg/dL Final     Calcium   Date Value Ref Range Status   03/18/2025 9.5 8.7 - 10.5 mg/dL Final     Total Protein   Date Value Ref Range Status   03/18/2025 6.6 6.0 - 8.4 g/dL Final     Albumin   Date Value Ref Range Status   03/18/2025 3.5 3.5 - 5.2 g/dL Final     Total Bilirubin   Date Value Ref Range Status   03/18/2025 0.4 0.1 - 1.0 mg/dL Final     Comment:     For infants and newborns, interpretation of results should be based  on gestational age, weight and in agreement with clinical  observations.    Premature Infant recommended reference ranges:  Up to 24 hours.............<8.0 mg/dL  Up to 48 hours............<12.0 mg/dL  3-5 days..................<15.0 mg/dL  6-29 days.................<15.0 mg/dL       Alkaline Phosphatase   Date Value Ref Range Status   03/18/2025 61 40 - 150 U/L Final     AST   Date Value Ref Range Status   03/18/2025 24  10 - 40 U/L Final     ALT   Date Value Ref Range Status   03/18/2025 15 10 - 44 U/L Final     Anion Gap   Date Value Ref Range Status   03/18/2025 13 8 - 16 mmol/L Final     eGFR   Date Value Ref Range Status   03/18/2025 >60.0 >60 mL/min/1.73 m^2 Final    .      Medical history reviewed, Medications reconciled.          Deena Rivera, SOFYAP-C  Family Medicine           [1]   Current Outpatient Medications on File Prior to Visit   Medication Sig Dispense Refill    albuterol (VENTOLIN HFA) 90 mcg/actuation inhaler Inhale 2 puffs into the lungs every 6 (six) hours as needed for Wheezing or Shortness of Breath. Rescue 18 g 3    alpha lipoic acid 200 mg Cap Take 1 capsule by mouth 2 (two) times daily.      ALPRAZolam (XANAX) 0.5 MG tablet Take 1 tablet (0.5 mg total) by mouth daily as needed for Anxiety. 20 tablet 0    azelastine (ASTELIN) 137 mcg (0.1 %) nasal spray 1 spray 2x a day as needed for nasal congestion; generic please 30 mL 2    betamethasone valerate 0.1% (VALISONE) 0.1 % Crea APPLY TOPICALLY 2 TIMES DAILY 45 g 0    biotin 1 mg tablet Take 1,000 mcg by mouth once daily.      blood sugar diagnostic (TRUE METRIX GLUCOSE TEST STRIP) Strp Use to test blood glucose one (1) to two (2) times daily, to be used with insurance-preferred brand of glucometer/supplies. 200 each 3    blood sugar diagnostic Strp Use to test blood glucose 1-2 times daily 100 strip 1    blood-glucose meter kit Use as instructed 1 each 0    budesonide-formoterol 160-4.5 mcg (SYMBICORT) 160-4.5 mcg/actuation HFAA Inhale 2 puffs into the lungs every 12 (twelve) hours. Controller 10.2 g 2    CALCIUM CARBONATE/VITAMIN D3 (CALCIUM 600 + D,3, ORAL) Take 600 mg by mouth 2 (two) times daily.      coenzyme Q10 200 mg capsule Take 200 mg by mouth once daily.      doxycycline (VIBRA-TABS) 100 MG tablet Take 1 tablet (100 mg total) by mouth 2 (two) times daily. 14 tablet 0    hydrocortisone 2.5 % cream       lancets 32 gauge Misc Use to test blood  glucose 1-2 times daily 100 each 1    LUTEIN ORAL Take by mouth once daily at 6am.      magnesium oxide (MAG-OX) 400 mg (241.3 mg magnesium) tablet Take 1 tablet (400 mg total) by mouth once daily. 90 tablet 1    metFORMIN (GLUCOPHAGE-XR) 500 MG ER 24hr tablet TAKE 1 TABLET BY MOUTH TWICE DAILY WITH MEALS 180 tablet 1    multivitamin (THERAGRAN) per tablet Take 1 tablet by mouth once daily.      TRUE METRIX AIR GLUCOSE METER [facility] USE AS DIRECTED 1 each 0    UNABLE TO FIND AG Pro - 2 pills twice a day      VITAMIN C 1000 MG tablet Take 1,000 mg by mouth once daily.      [DISCONTINUED] sulfamethoxazole-trimethoprim 800-160mg (BACTRIM DS) 800-160 mg Tab Take 1 tablet by mouth 2 (two) times daily. 10 tablet 0    rosuvastatin (CRESTOR) 10 MG tablet Take 1 tablet (10 mg total) by mouth every evening. (Patient not taking: Reported on 2/20/2025) 90 tablet 3    torsemide (DEMADEX) 20 MG Tab Take 1 tablet (20 mg total) by mouth once daily. 20 tablet 0     No current facility-administered medications on file prior to visit.

## 2025-08-08 NOTE — PATIENT INSTRUCTIONS
Please go to ER/urgent care if symptoms persist/worsen, especially if after hours.      Do not hesitate to get in touch with me should you have any further questions.      Thank you for trusting me with your care!  I wish you health and happiness.     KRISTINA Howell

## 2025-08-19 ENCOUNTER — HOSPITAL ENCOUNTER (OUTPATIENT)
Dept: RADIOLOGY | Facility: HOSPITAL | Age: 75
Discharge: HOME | End: 2025-08-19
Attending: NURSE PRACTITIONER
Payer: MEDICARE

## 2025-08-19 ENCOUNTER — OFFICE VISIT (OUTPATIENT)
Dept: FAMILY MEDICINE | Facility: CLINIC | Age: 75
End: 2025-08-19
Payer: MEDICARE

## 2025-08-19 VITALS
TEMPERATURE: 98 F | OXYGEN SATURATION: 97 % | DIASTOLIC BLOOD PRESSURE: 68 MMHG | HEIGHT: 60 IN | SYSTOLIC BLOOD PRESSURE: 104 MMHG | RESPIRATION RATE: 16 BRPM | BODY MASS INDEX: 29.98 KG/M2 | WEIGHT: 152.69 LBS | HEART RATE: 74 BPM

## 2025-08-19 DIAGNOSIS — M54.42 ACUTE BILATERAL LOW BACK PAIN WITH BILATERAL SCIATICA: ICD-10-CM

## 2025-08-19 DIAGNOSIS — S69.91XA HAND INJURY, RIGHT, INITIAL ENCOUNTER: ICD-10-CM

## 2025-08-19 DIAGNOSIS — M79.641 RIGHT HAND PAIN: ICD-10-CM

## 2025-08-19 DIAGNOSIS — S13.4XXA WHIPLASH INJURIES, INITIAL ENCOUNTER: ICD-10-CM
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
## 2025-08-19 DIAGNOSIS — M54.41 ACUTE BILATERAL LOW BACK PAIN WITH BILATERAL SCIATICA: ICD-10-CM

## 2025-08-19 DIAGNOSIS — M54.6 ACUTE LEFT-SIDED THORACIC BACK PAIN: ICD-10-CM

## 2025-08-19 PROCEDURE — 1101F PT FALLS ASSESS-DOCD LE1/YR: CPT | Mod: CPTII,S$GLB,, | Performed by: NURSE PRACTITIONER

## 2025-08-19 PROCEDURE — 3288F FALL RISK ASSESSMENT DOCD: CPT | Mod: CPTII,S$GLB,, | Performed by: NURSE PRACTITIONER

## 2025-08-19 PROCEDURE — 3074F SYST BP LT 130 MM HG: CPT | Mod: CPTII,S$GLB,, | Performed by: NURSE PRACTITIONER

## 2025-08-19 PROCEDURE — 1125F AMNT PAIN NOTED PAIN PRSNT: CPT | Mod: CPTII,S$GLB,, | Performed by: NURSE PRACTITIONER

## 2025-08-19 PROCEDURE — 1160F RVW MEDS BY RX/DR IN RCRD: CPT | Mod: CPTII,S$GLB,, | Performed by: NURSE PRACTITIONER

## 2025-08-19 PROCEDURE — 73130 X-RAY EXAM OF HAND: CPT | Mod: 26,RT,, | Performed by: RADIOLOGY

## 2025-08-19 PROCEDURE — 1159F MED LIST DOCD IN RCRD: CPT | Mod: CPTII,S$GLB,, | Performed by: NURSE PRACTITIONER

## 2025-08-19 PROCEDURE — 73130 X-RAY EXAM OF HAND: CPT | Mod: TC,PN,RT

## 2025-08-19 PROCEDURE — 99214 OFFICE O/P EST MOD 30 MIN: CPT | Mod: S$GLB,,, | Performed by: NURSE PRACTITIONER

## 2025-08-19 PROCEDURE — 99999 PR PBB SHADOW E&M-EST. PATIENT-LVL V: CPT | Mod: PBBFAC,,, | Performed by: NURSE PRACTITIONER

## 2025-08-19 PROCEDURE — 3078F DIAST BP <80 MM HG: CPT | Mod: CPTII,S$GLB,, | Performed by: NURSE PRACTITIONER

## 2025-08-19 RX ORDER — METHOCARBAMOL 500 MG/1
500 TABLET, FILM COATED ORAL 4 TIMES DAILY
Qty: 40 TABLET | Refills: 0 | Status: SHIPPED | OUTPATIENT
Start: 2025-08-19 | End: 2025-08-29

## 2025-09-03 DIAGNOSIS — Z78.0 MENOPAUSE: ICD-10-CM
